# Patient Record
Sex: FEMALE | Race: WHITE | NOT HISPANIC OR LATINO | Employment: OTHER | URBAN - METROPOLITAN AREA
[De-identification: names, ages, dates, MRNs, and addresses within clinical notes are randomized per-mention and may not be internally consistent; named-entity substitution may affect disease eponyms.]

---

## 2018-06-25 ENCOUNTER — OFFICE VISIT (OUTPATIENT)
Dept: GASTROENTEROLOGY | Facility: CLINIC | Age: 58
End: 2018-06-25
Payer: COMMERCIAL

## 2018-06-25 VITALS
HEART RATE: 75 BPM | HEIGHT: 67 IN | BODY MASS INDEX: 18.89 KG/M2 | DIASTOLIC BLOOD PRESSURE: 86 MMHG | WEIGHT: 120.38 LBS | SYSTOLIC BLOOD PRESSURE: 135 MMHG

## 2018-06-25 DIAGNOSIS — R10.13 EPIGASTRIC ABDOMINAL PAIN: Primary | ICD-10-CM

## 2018-06-25 PROCEDURE — 99999 PR PBB SHADOW E&M-EST. PATIENT-LVL III: CPT | Mod: PBBFAC,,, | Performed by: INTERNAL MEDICINE

## 2018-06-25 PROCEDURE — 3008F BODY MASS INDEX DOCD: CPT | Mod: CPTII,S$GLB,, | Performed by: INTERNAL MEDICINE

## 2018-06-25 PROCEDURE — 99203 OFFICE O/P NEW LOW 30 MIN: CPT | Mod: S$GLB,,, | Performed by: INTERNAL MEDICINE

## 2018-06-25 RX ORDER — PRAVASTATIN SODIUM 80 MG/1
80 TABLET ORAL DAILY
COMMUNITY

## 2018-06-25 RX ORDER — TRAZODONE HYDROCHLORIDE 100 MG/1
100 TABLET ORAL NIGHTLY PRN
COMMUNITY

## 2018-06-25 RX ORDER — VITAMIN B COMPLEX
1 CAPSULE ORAL DAILY
COMMUNITY

## 2018-06-25 NOTE — PROGRESS NOTES
[facility] Consultation Note    Reason for Consult:    Chief Complaint   Patient presents with    Abdominal Pain     burning sensation for 36 hrs.       PCP:   Chino Figueroa    Referring MD:  Meme Self  No address on file    HPI:  Eden Kuhn is a 57 y.o. female here for evaluation of upper abdominal and epigastric abdominal pain.  It is a burning pain associated with sweats.  It was sudden in onset Thursday night of last week and was severe.  It lasted for about 36 hours.  She denies nausea or vomiting.  Prevacid seemed to help.  She also tried Pepto Bismol and Zantac, so she is unsure which treatment really helped the most.  Yesterday was the last episode of burning.  She has never experienced this before.  Eight to ten years ago, she had a general uncomfortable feeling in her stomach which she feels was due to anxiety, but it was nothing like this.  She has been under a lot of stress in the last 6 weeks.  Her diet is not extensive.  She had a hamburger before this happened - this is not something she normally eats.  She drinks a lot of beer, sometimes up to 30 in a week.  She denies other alcohol intake.    Her bowels have not changed.  She denies any medication changes.  She takes NSAIDs about 3 times per week.  She denies heartburn.  She has been able to eat without worsening pain or nausea.  She has never had a colonoscopy.            ROS:  Constitutional: No fevers, chills, No weight loss  ENT: No congestion, rhinorrhea, or chronic sinus problems  CV: No chest pain or palpitations  Pulm: No cough, No shortness of breath  Ophtho: No vision changes or pain  GI: see HPI  Derm: she had an itchy rash on her back a few months ago  Heme: No lymphadenopathy, No bruising  MSK: she has right knee pain and pains in both hands, no joint swelling  : No dysuria, No frequent urination  Endo: No hot or cold intolerance        Medical History:  has a past medical history of Thyroid  "disease.    Surgical History:  has a past surgical history that includes bladder repair; Hysterectomy; Anal fistulotomy (01/21/2002); Cystocele repair (06/06/2011); and Eye surgery (02/23/2012).    Family History: family history includes Diabetes in her mother; Thyroid disease in her mother.    Social History:  reports that she has been smoking.  She does not have any smokeless tobacco history on file. She reports that she drinks alcohol. She reports that she does not use drugs.    Review of patient's allergies indicates:   Allergen Reactions    Ciprocinonide Anaphylaxis       Prior to Admission medications    Medication Sig Start Date End Date Taking? Authorizing Provider   b complex vitamins capsule Take 1 capsule by mouth once daily.   Yes Historical Provider, MD   carisoprodol (SOMA) 350 MG tablet  1/8/16  Yes Historical Provider, MD   diphenoxylate-atropine 2.5-0.025 mg (LOMOTIL) 2.5-0.025 mg per tablet Take 1 tablet by mouth every 6 (six) hours as needed. 1/8/16  Yes Historical Provider, MD   hydrocodone-acetaminophen 10-325mg (NORCO)  mg Tab Take 1 tablet by mouth every 6 (six) hours as needed. 2/12/16  Yes Historical Provider, MD   levothyroxine (SYNTHROID) 137 MCG Tab tablet 125 mcg.  1/12/16  Yes Historical Provider, MD   pravastatin (PRAVACHOL) 40 MG tablet Take 40 mg by mouth once daily.   Yes Historical Provider, MD   traZODone (DESYREL) 50 MG tablet Take 50 mg by mouth as needed for Insomnia.   Yes Historical Provider, MD   levothyroxine (SYNTHROID, LEVOTHROID) 175 MCG tablet Take 175 mcg by mouth once daily.      Historical Provider, MD   valacyclovir (VALTREX) 500 MG tablet  2/18/16 6/25/18  Historical Provider, MD   zolpidem (AMBIEN) 10 mg Tab Take 10 mg by mouth nightly. 1/28/16 6/25/18  Historical Provider, MD       Objective Findings:  Vital Signs:  /86   Pulse 75   Ht 5' 7" (1.702 m)   Wt 54.6 kg (120 lb 5.9 oz)   BMI 18.85 kg/m²   Body mass index is 18.85 kg/m².    Physical " Exam:  General Appearance:  Well appearing in no acute distress, appears stated age  Head:  Normocephalic, atraumatic  Eyes:  No scleral icterus or pallor, EOMI  ENT:  Neck supple, moist mucosa; OP clear  Lungs:  CTA bilaterally in anterior and posterior fields, no wheezes, no crackles; no dullness  Heart:  Regular rate and rhythm, S1, S2 normal, no murmurs heard  Abdomen:  Soft, mild epigastric tenderness without rebound, non distended with positive bowel sounds in all four quadrants. No hepatosplenomegaly, ascites, or mass  Extremities:  No clubbing, cyanosis, or edema        Labs:  Lab Results   Component Value Date    WBC 10.11 11/19/2013    HGB 12.0 11/19/2013    HCT 35.1 (L) 11/19/2013    MCV 97 11/19/2013    RDW 13.0 11/19/2013     (H) 11/19/2013    GRAN 5.9 11/19/2013    GRAN 58.6 11/19/2013    LYMPH 3.2 11/19/2013    LYMPH 31.9 11/19/2013    MONO 0.8 11/19/2013    MONO 7.7 11/19/2013    EOS 0.1 11/19/2013    BASO 0.02 11/19/2013     Lab Results   Component Value Date     (L) 11/19/2013    K 3.7 11/19/2013    CL 97 11/19/2013    CO2 22 (L) 11/19/2013    GLU 72 11/19/2013    BUN 10 11/19/2013    CREATININE 0.8 11/19/2013    CALCIUM 9.8 11/19/2013    PROT 7.5 11/19/2013    ALBUMIN 4.4 11/19/2013    BILITOT 0.5 11/19/2013    ALKPHOS 38 (L) 11/19/2013    AST 19 11/19/2013    ALT 18 11/19/2013                   Assessment:  Eden Kuhn is a 57 y.o. female with:  1. Epigastric abdominal pain      Unclear etiology.  She has significant alcohol intake with beer and also NSAID use.  Consider pancreatitis, peptic ulcer, H pylori or gallstones.        Recommendations/Plan:  1. Will get labs today as noted below  2. Schedule for abdominal ultrasound  3. I will check for H pylori  4. Avoid NSAIDs  5. Take Prevacid daily (OTC)    Follow-up pending the above      Order summary:  Orders Placed This Encounter    US Abdomen Complete    CBC auto differential    Comprehensive metabolic panel    Lipase     H. pylori antigen, stool         Thank you so much for allowing me to participate in the care of Eden Olivera MD

## 2018-06-28 ENCOUNTER — HOSPITAL ENCOUNTER (OUTPATIENT)
Dept: RADIOLOGY | Facility: HOSPITAL | Age: 58
Discharge: HOME | End: 2018-06-28
Attending: INTERNAL MEDICINE
Payer: COMMERCIAL

## 2018-06-28 DIAGNOSIS — R10.13 EPIGASTRIC ABDOMINAL PAIN: ICD-10-CM

## 2018-06-28 PROCEDURE — 76700 US EXAM ABDOM COMPLETE: CPT | Mod: TC

## 2018-06-28 PROCEDURE — 76700 US EXAM ABDOM COMPLETE: CPT | Mod: 26,,, | Performed by: RADIOLOGY

## 2018-07-02 ENCOUNTER — TELEPHONE (OUTPATIENT)
Dept: GASTROENTEROLOGY | Facility: CLINIC | Age: 58
End: 2018-07-02

## 2018-07-02 NOTE — TELEPHONE ENCOUNTER
----- Message from Nancy Garner sent at 7/2/2018  3:37 PM CDT -----  Contact: pt can be reached at   Pt is calling because she was not able to complete the last test. Pt will complete soon as possible and she would like to know about there test results.      Thank you!

## 2018-07-03 ENCOUNTER — TELEPHONE (OUTPATIENT)
Dept: GASTROENTEROLOGY | Facility: CLINIC | Age: 58
End: 2018-07-03

## 2018-07-03 DIAGNOSIS — R10.10 UPPER ABDOMINAL PAIN: Primary | ICD-10-CM

## 2018-07-03 NOTE — TELEPHONE ENCOUNTER
Returned patient's call. Patient is requesting the results of her US and labs. Mrs. JOVON Carft is not able to turn in her stool specimen at this time.    Message routed to Dr. Olivera.

## 2018-07-03 NOTE — TELEPHONE ENCOUNTER
----- Message from Bere Beck sent at 7/3/2018 11:25 AM CDT -----  Contact: Self [phone]  Patient is requesting a call back from the nurse to get her results.  Patient states she tried yesterday to get a call back but has not heard anything yet.    Patient may be reached at [phone].    Thank you.  SHARRI

## 2018-07-03 NOTE — TELEPHONE ENCOUNTER
I called and discussed results with the patient.  No findings yet to explain symptoms.  Pancreas and liver are normal.  Sludge it the gallbladder, but no signs of inflammation or biliary obstruction.  She has not been able to submit the stool sample for H pylori yet.    She has been taking Prevacid daily.  She still is not feeling better, but also not worse.    I recommend we get an abdominal CT scan and also arrange for EGD.  Will get the CT scan first.  Will plan on EGD for th week of July 16-20 and can cancel if not needed by then.  She will try and submit the stool sample.

## 2018-07-03 NOTE — TELEPHONE ENCOUNTER
CT scan scheduled and instructions reviewed with patient. All questions answered to patient's satisfaction.

## 2018-07-05 ENCOUNTER — LAB VISIT (OUTPATIENT)
Dept: LAB | Facility: HOSPITAL | Age: 58
End: 2018-07-05
Attending: INTERNAL MEDICINE
Payer: COMMERCIAL

## 2018-07-05 DIAGNOSIS — R10.13 EPIGASTRIC ABDOMINAL PAIN: ICD-10-CM

## 2018-07-05 PROCEDURE — 87338 HPYLORI STOOL AG IA: CPT

## 2018-07-06 ENCOUNTER — TELEPHONE (OUTPATIENT)
Dept: GASTROENTEROLOGY | Facility: CLINIC | Age: 58
End: 2018-07-06

## 2018-07-06 NOTE — TELEPHONE ENCOUNTER
----- Message from Yuliya Allen MA sent at 7/6/2018  3:58 PM CDT -----  Contact: self  [phone]  Needs Advice    Reason for call:    I have a ctscan today, Friday, 7-6-18 at 6:15pm and it is still pending with her insurance company  Communication Preference:  Phone# above  Additional Information:  Please call me since I want to do this test and the insurance company is telling me it is still pending approval.

## 2018-07-09 ENCOUNTER — TELEPHONE (OUTPATIENT)
Dept: GASTROENTEROLOGY | Facility: CLINIC | Age: 58
End: 2018-07-09

## 2018-07-09 NOTE — TELEPHONE ENCOUNTER
----- Message from Leyla Howard sent at 7/9/2018  1:24 PM CDT -----  Contact: Brandi, friend, [phone]  Patient Returning Call from [facility]    Who Left Message for Patient: Pt states she got a missed called from [facility], but chart shows no record of calling.   Communication Preference: hank Paz, [phone]   Additional Information:

## 2018-07-09 NOTE — TELEPHONE ENCOUNTER
Returned patient's call. Patient is aware approval is still pending for her CT scan. She verbalized understanding and will contact our Pre-Service department for an update.

## 2018-07-11 LAB — H PYLORI AG STL QL: NOT DETECTED

## 2018-07-12 ENCOUNTER — HOSPITAL ENCOUNTER (OUTPATIENT)
Dept: RADIOLOGY | Facility: HOSPITAL | Age: 58
Discharge: HOME | End: 2018-07-12
Attending: INTERNAL MEDICINE
Payer: COMMERCIAL

## 2018-07-12 DIAGNOSIS — R10.10 UPPER ABDOMINAL PAIN: ICD-10-CM

## 2018-07-12 PROCEDURE — 74177 CT ABD & PELVIS W/CONTRAST: CPT | Mod: TC

## 2018-07-12 PROCEDURE — 74177 CT ABD & PELVIS W/CONTRAST: CPT | Mod: 26,,, | Performed by: RADIOLOGY

## 2018-07-12 PROCEDURE — 25500020 PHARM REV CODE 255: Performed by: INTERNAL MEDICINE

## 2018-07-12 RX ADMIN — IOHEXOL 75 ML: 350 INJECTION, SOLUTION INTRAVENOUS at 03:07

## 2018-07-13 ENCOUNTER — TELEPHONE (OUTPATIENT)
Dept: GASTROENTEROLOGY | Facility: CLINIC | Age: 58
End: 2018-07-13

## 2018-07-13 NOTE — TELEPHONE ENCOUNTER
Returned patient's call, but she did not answer. Left voicemail for patient to return call.    Message routed to Dr. Olivera.

## 2018-07-13 NOTE — TELEPHONE ENCOUNTER
----- Message from Nuria Dk sent at 7/13/2018  9:36 AM CDT -----  Contact: self [phone]  Jarod - calling re her procedures and test results - please call patient at [phone]

## 2018-07-16 ENCOUNTER — PATIENT MESSAGE (OUTPATIENT)
Dept: GASTROENTEROLOGY | Facility: CLINIC | Age: 58
End: 2018-07-16

## 2018-07-16 NOTE — TELEPHONE ENCOUNTER
Spoke with the patient regarding results.  CT scan unremarkable for cause of pain.  H pylori stool test is negative.  Will proceed with EGD.  Message sent to endoscopy schedulers to contact her.

## 2018-07-17 ENCOUNTER — TELEPHONE (OUTPATIENT)
Dept: ENDOSCOPY | Facility: HOSPITAL | Age: 58
End: 2018-07-17

## 2018-08-03 ENCOUNTER — ANESTHESIA EVENT (OUTPATIENT)
Dept: ENDOSCOPY | Facility: HOSPITAL | Age: 58
End: 2018-08-03
Payer: COMMERCIAL

## 2018-08-03 ENCOUNTER — HOSPITAL ENCOUNTER (OUTPATIENT)
Facility: HOSPITAL | Age: 58
Discharge: HOME | End: 2018-08-03
Attending: INTERNAL MEDICINE | Admitting: INTERNAL MEDICINE
Payer: COMMERCIAL

## 2018-08-03 ENCOUNTER — ANESTHESIA (OUTPATIENT)
Dept: ENDOSCOPY | Facility: HOSPITAL | Age: 58
End: 2018-08-03
Payer: COMMERCIAL

## 2018-08-03 ENCOUNTER — SURGERY (OUTPATIENT)
Age: 58
End: 2018-08-03

## 2018-08-03 VITALS
HEART RATE: 74 BPM | RESPIRATION RATE: 16 BRPM | BODY MASS INDEX: 19.99 KG/M2 | HEIGHT: 65 IN | DIASTOLIC BLOOD PRESSURE: 82 MMHG | SYSTOLIC BLOOD PRESSURE: 143 MMHG | OXYGEN SATURATION: 99 % | TEMPERATURE: 98 F | WEIGHT: 120 LBS

## 2018-08-03 DIAGNOSIS — R10.10 UPPER ABDOMINAL PAIN: Primary | ICD-10-CM

## 2018-08-03 PROCEDURE — 88305 TISSUE EXAM BY PATHOLOGIST: CPT | Mod: 26,,, | Performed by: PATHOLOGY

## 2018-08-03 PROCEDURE — 43239 EGD BIOPSY SINGLE/MULTIPLE: CPT | Mod: ,,, | Performed by: INTERNAL MEDICINE

## 2018-08-03 PROCEDURE — 37000008 HC ANESTHESIA 1ST 15 MINUTES: Performed by: INTERNAL MEDICINE

## 2018-08-03 PROCEDURE — 88305 TISSUE EXAM BY PATHOLOGIST: CPT | Performed by: PATHOLOGY

## 2018-08-03 PROCEDURE — E9220 PRA ENDO ANESTHESIA: HCPCS | Mod: ,,, | Performed by: NURSE ANESTHETIST, CERTIFIED REGISTERED

## 2018-08-03 PROCEDURE — 27201012 HC FORCEPS, HOT/COLD, DISP: Performed by: INTERNAL MEDICINE

## 2018-08-03 PROCEDURE — 25000003 PHARM REV CODE 250: Performed by: INTERNAL MEDICINE

## 2018-08-03 PROCEDURE — 43239 EGD BIOPSY SINGLE/MULTIPLE: CPT | Performed by: INTERNAL MEDICINE

## 2018-08-03 PROCEDURE — 37000009 HC ANESTHESIA EA ADD 15 MINS: Performed by: INTERNAL MEDICINE

## 2018-08-03 PROCEDURE — 63600175 PHARM REV CODE 636 W HCPCS: Performed by: NURSE ANESTHETIST, CERTIFIED REGISTERED

## 2018-08-03 RX ORDER — MIDAZOLAM HYDROCHLORIDE 1 MG/ML
INJECTION, SOLUTION INTRAMUSCULAR; INTRAVENOUS
Status: DISCONTINUED | OUTPATIENT
Start: 2018-08-03 | End: 2018-08-03

## 2018-08-03 RX ORDER — SODIUM CHLORIDE 0.9 % (FLUSH) 0.9 %
3 SYRINGE (ML) INJECTION
Status: DISCONTINUED | OUTPATIENT
Start: 2018-08-03 | End: 2018-08-03 | Stop reason: HOSPADM

## 2018-08-03 RX ORDER — SODIUM CHLORIDE 9 MG/ML
INJECTION, SOLUTION INTRAVENOUS CONTINUOUS
Status: DISCONTINUED | OUTPATIENT
Start: 2018-08-03 | End: 2018-08-03 | Stop reason: HOSPADM

## 2018-08-03 RX ORDER — PROPOFOL 10 MG/ML
VIAL (ML) INTRAVENOUS
Status: DISCONTINUED | OUTPATIENT
Start: 2018-08-03 | End: 2018-08-03

## 2018-08-03 RX ADMIN — SODIUM CHLORIDE: 0.9 INJECTION, SOLUTION INTRAVENOUS at 10:08

## 2018-08-03 RX ADMIN — PROPOFOL 50 MG: 10 INJECTION, EMULSION INTRAVENOUS at 11:08

## 2018-08-03 RX ADMIN — MIDAZOLAM HYDROCHLORIDE 2 MG: 1 INJECTION, SOLUTION INTRAMUSCULAR; INTRAVENOUS at 11:08

## 2018-08-03 RX ADMIN — PROPOFOL 40 MG: 10 INJECTION, EMULSION INTRAVENOUS at 11:08

## 2018-08-03 RX ADMIN — PROPOFOL 30 MG: 10 INJECTION, EMULSION INTRAVENOUS at 11:08

## 2018-08-03 NOTE — ANESTHESIA PREPROCEDURE EVALUATION
08/03/2018  Eden Kuhn is a 57 y.o., female.    Patient Active Problem List   Diagnosis    Upper abdominal pain         Anesthesia Evaluation    I have reviewed the Patient Summary Reports.    I have reviewed the Nursing Notes.   I have reviewed the Medications.     Review of Systems  Anesthesia Hx:  Denies Family Hx of Anesthesia complications.   Denies Personal Hx of Anesthesia complications.       Physical Exam  General:  Well nourished    Airway/Jaw/Neck:  Airway Findings: Mouth Opening: Normal Tongue: Normal  General Airway Assessment: Adult  Mallampati: II  TM Distance: Normal, at least 6 cm       Chest/Lungs:  Chest/Lungs Findings: Clear to auscultation, Normal Respiratory Rate     Heart/Vascular:  Heart Findings: Rate: Normal  Rhythm: Regular Rhythm  Sounds: Normal     Abdomen:  Abdomen Findings:  Normal     Musculoskeletal:  Musculoskeletal Findings:    Skin:  Skin Findings:     Mental Status:  Mental Status Findings:  Cooperative, Alert and Oriented         Anesthesia Plan  Type of Anesthesia, risks & benefits discussed:  Anesthesia Type:  general  Patient's Preference: general  Intra-op Monitoring Plan:   Intra-op Monitoring Plan Comments:   Post Op Pain Control Plan:   Post Op Pain Control Plan Comments:   Induction:   IV  Beta Blocker:  Patient is not currently on a Beta-Blocker (No further documentation required).       Informed Consent: Patient understands risks and agrees with Anesthesia plan.  Questions answered. Anesthesia consent signed with patient.  ASA Score: 2     Day of Surgery Review of History & Physical: I have interviewed and examined the patient. I have reviewed the patient's H&P dated:  There are no significant changes.          Ready For Surgery From Anesthesia Perspective.

## 2018-08-03 NOTE — TRANSFER OF CARE
"Anesthesia Transfer of Care Note    Patient: Eden Kuhn    Procedure(s) Performed: Procedure(s) (LRB):  EGD (ESOPHAGOGASTRODUODENOSCOPY) (N/A)    Patient location: PACU    Anesthesia Type: general    Transport from OR: Transported from OR on room air with adequate spontaneous ventilation    Post assessment: tolerated procedure well and no apparent anesthetic complications    Post vital signs: stable    Level of consciousness: awake and alert    Nausea/Vomiting: no nausea/vomiting    Complications: none    Transfer of care protocol was followed      Last vitals:   Visit Vitals  BP (!) 137/93 (BP Location: Left arm, Patient Position: Sitting)   Pulse 80   Temp 36.2 °C (97.2 °F) (Temporal)   Resp 16   Ht 5' 5" (1.651 m)   Wt 54.4 kg (120 lb)   SpO2 99%   Breastfeeding? No   BMI 19.97 kg/m²     "

## 2018-08-03 NOTE — DISCHARGE INSTRUCTIONS

## 2018-08-03 NOTE — H&P
Short Stay Endoscopy History and Physical    PCP - Chino Figueroa MD     Procedure - EGD  ASA - per anesthesia  Mallampati - per anesthesia  History of Anesthesia problems - no  Family history Anesthesia problems -  no   Plan of anesthesia - General    HPI:  This is a 57 y.o. female here for evaluation of RUQ abdominal pain.      Reflux - no  Dysphagia - no  Abdominal pain - yes  Diarrhea - no    ROS:  Constitutional: No fevers, chills, No weight loss  CV: No chest pain  Pulm: No cough, No shortness of breath  Ophtho: No vision changes  GI: see HPI  Derm: No rash    Medical History:  has a past medical history of Anxiety; Arthritis; Insomnia; and Thyroid disease.    Surgical History:  has a past surgical history that includes bladder repair; Hysterectomy; Anal fistulotomy (01/21/2002); Cystocele repair (06/06/2011); and Eye surgery (02/23/2012).    Family History: family history includes Diabetes in her mother; Thyroid disease in her mother.. Otherwise no colon cancer, inflammatory bowel disease, or GI malignancies.    Social History:  reports that she has been smoking.  She has a 6.50 pack-year smoking history. She does not have any smokeless tobacco history on file. She reports that she drinks about 1.2 - 1.8 oz of alcohol per week . She reports that she does not use drugs.    Review of patient's allergies indicates:   Allergen Reactions    Ciprocinonide Anaphylaxis       Medications:   Prescriptions Prior to Admission   Medication Sig Dispense Refill Last Dose    b complex vitamins capsule Take 1 capsule by mouth once daily.   8/2/2018 at Unknown time    carisoprodol (SOMA) 350 MG tablet   0 8/3/2018 at Unknown time    hydrocodone-acetaminophen 10-325mg (NORCO)  mg Tab Take 1 tablet by mouth every 6 (six) hours as needed.  0 8/2/2018 at Unknown time    levothyroxine (SYNTHROID) 137 MCG Tab tablet 125 mcg.   0 8/3/2018 at Unknown time    levothyroxine (SYNTHROID, LEVOTHROID) 175 MCG tablet Take 175  mcg by mouth once daily.     8/3/2018 at Unknown time    pravastatin (PRAVACHOL) 40 MG tablet Take 40 mg by mouth once daily.   8/3/2018 at Unknown time    diphenoxylate-atropine 2.5-0.025 mg (LOMOTIL) 2.5-0.025 mg per tablet Take 1 tablet by mouth every 6 (six) hours as needed.  0 Unknown at Unknown time    traZODone (DESYREL) 50 MG tablet Take 50 mg by mouth as needed for Insomnia.   Unknown at Unknown time       Physical Exam:    Vital Signs:   Vitals:    08/03/18 1104   BP: (!) 137/93   Pulse: 80   Resp: 16   Temp: 97.2 °F (36.2 °C)       General Appearance: Well appearing in no acute distress  Eyes:    No scleral icterus  ENT: Neck supple, Lips, mucosa, and tongue normal; teeth and gums normal  Lungs: CTA anteriorly  Heart:  Regular rate, S1, S2 normal, no murmurs heard.  Abdomen: Soft, non tender, non distended with normal bowel sounds. No hepatosplenomegaly, ascites, or mass.  Extremities: No edema  Skin: No rash    Labs:  Lab Results   Component Value Date    WBC 8.17 06/28/2018    HGB 12.1 06/28/2018    HCT 34.7 (L) 06/28/2018     (H) 06/28/2018    CHOL 208 (H) 11/19/2013    TRIG 75 11/19/2013    HDL 80 (H) 11/19/2013    ALT 12 06/28/2018    AST 16 06/28/2018     (L) 06/28/2018    K 4.1 06/28/2018    CL 99 06/28/2018    CREATININE 0.7 06/28/2018    BUN 8 06/28/2018    CO2 27 06/28/2018    TSH 0.319 (L) 11/19/2013    INR 1.0 08/13/2009    HGBA1C 4.8 12/02/2004       I have explained the risks and benefits of endoscopy procedures to the patient including but not limited to bleeding, perforation, infection, and death.    Jovany Balderrama MD PGY-V  Gastroenterology Fellow  [facility]  [phone]

## 2018-08-03 NOTE — ANESTHESIA POSTPROCEDURE EVALUATION
"Anesthesia Post Evaluation    Patient: Eden Kuhn    Procedure(s) Performed: Procedure(s) (LRB):  EGD (ESOPHAGOGASTRODUODENOSCOPY) (N/A)    Final Anesthesia Type: general  Patient location during evaluation: GI PACU  Patient participation: Yes- Able to Participate  Level of consciousness: awake and alert and oriented  Post-procedure vital signs: reviewed and stable  Pain management: adequate  Airway patency: patent  PONV status at discharge: No PONV  Anesthetic complications: no      Cardiovascular status: hemodynamically stable  Respiratory status: unassisted  Hydration status: euvolemic  Follow-up not needed.        Visit Vitals  BP (!) 143/82   Pulse 74   Temp 36.7 °C (98.1 °F) (Temporal)   Resp 16   Ht 5' 5" (1.651 m)   Wt 54.4 kg (120 lb)   SpO2 99%   Breastfeeding? No   BMI 19.97 kg/m²       Pain/Mary Score: Pain Assessment Performed: Yes (8/3/2018 12:25 PM)  Presence of Pain: denies (8/3/2018 12:25 PM)  Mary Score: 10 (8/3/2018 12:00 PM)      "

## 2018-08-03 NOTE — [employer] PATIENT INSTRUCTIONS
Discharge Summary/Instructions after an Endoscopic Procedure  Patient Name: Eden Grimaldo  Patient MRN: [id number]  Patient YOB: 1960 Friday, August 03, 2018  Adam Olivera MD  RESTRICTIONS:  During your procedure today, you received medications for sedation.  These   medications may affect your judgment, balance and coordination.  Therefore,   for 24 hours, you have the following restrictions:   - DO NOT drive a car, operate machinery, make legal/financial decisions,   sign important papers or drink alcohol.    ACTIVITY:  Today: no heavy lifting, straining or running due to procedural   sedation/anesthesia.  The following day: return to full activity including work.  DIET:  Eat and drink normally unless instructed otherwise.     TREATMENT FOR COMMON SIDE EFFECTS:  - Mild abdominal pain, nausea, belching, bloating or excessive gas:  rest,   eat lightly and use a heating pad.  - Sore Throat: treat with throat lozenges and/or gargle with warm salt   water.  - Because air was used during the procedure, expelling large amounts of air   from your rectum or belching is normal.  - If a bowel prep was taken, you may not have a bowel movement for 1-3 days.    This is normal.  SYMPTOMS TO WATCH FOR AND REPORT TO YOUR PHYSICIAN:  1. Abdominal pain or bloating, other than gas cramps.  2. Chest pain.  3. Back pain.  4. Signs of infection such as: chills or fever occurring within 24 hours   after the procedure.  5. Rectal bleeding, which would show as bright red, maroon, or black stools.   (A tablespoon of blood from the rectum is not serious, especially if   hemorrhoids are present.)  6. Vomiting.  7. Weakness or dizziness.  GO DIRECTLY TO THE NEAREST EMERGENCY ROOM IF YOU HAVE ANY OF THE FOLLOWING:      Difficulty breathing              Chills and/or fever over 101 F   Persistent vomiting and/or vomiting blood   Severe abdominal pain   Severe chest pain   Black, tarry stools   Bleeding- more than one  tablespoon   Any other symptom or condition that you feel may need urgent attention  Your doctor recommends these additional instructions:  If any biopsies were taken, your doctors clinic will contact you in 1 to 2   weeks with any results.  - Discharge patient to home.   - Patient has a contact number available for emergencies.  The signs and   symptoms of potential delayed complications were discussed with the   patient.  Return to normal activities tomorrow.  Written discharge   instructions were provided to the patient.   - Resume previous diet.   - Continue present medications.   - Await pathology results.   - Telephone GI clinic for pathology results in 1 week.  For questions, problems or results please call your physician - Adam Olivera MD at Work:  [phone].  [facility], EMERGENCY ROOM PHONE NUMBER: [phone]  IF A COMPLICATION OR EMERGENCY SITUATION ARISES AND YOU ARE UNABLE TO REACH   YOUR PHYSICIAN - GO DIRECTLY TO THE EMERGENCY ROOM.  Adam Olivera MD  8/3/2018 11:46:11 AM  This report has been verified and signed electronically.  [employer]

## 2018-08-10 ENCOUNTER — TELEPHONE (OUTPATIENT)
Dept: ENDOSCOPY | Facility: HOSPITAL | Age: 58
End: 2018-08-10

## 2018-08-14 ENCOUNTER — TELEPHONE (OUTPATIENT)
Dept: GASTROENTEROLOGY | Facility: CLINIC | Age: 58
End: 2018-08-14

## 2018-08-14 NOTE — TELEPHONE ENCOUNTER
----- Message from Adam Olivera MD sent at 8/14/2018  8:58 AM CDT -----  The stomach biopsies came back completely normal.    MA to call patient with results.

## 2019-11-14 ENCOUNTER — OFFICE VISIT (OUTPATIENT)
Dept: OPTOMETRY | Facility: CLINIC | Age: 59
End: 2019-11-14
Payer: COMMERCIAL

## 2019-11-14 DIAGNOSIS — H02.88A MEIBOMIAN GLAND DYSFUNCTION (MGD) OF UPPER AND LOWER LIDS OF BOTH EYES: Primary | ICD-10-CM

## 2019-11-14 DIAGNOSIS — H52.03 HYPEROPIA OF BOTH EYES: ICD-10-CM

## 2019-11-14 DIAGNOSIS — H52.4 PRESBYOPIA: ICD-10-CM

## 2019-11-14 DIAGNOSIS — H02.88B MEIBOMIAN GLAND DYSFUNCTION (MGD) OF UPPER AND LOWER LIDS OF BOTH EYES: Primary | ICD-10-CM

## 2019-11-14 PROCEDURE — 92004 PR EYE EXAM, NEW PATIENT,COMPREHESV: ICD-10-PCS | Mod: S$GLB,,, | Performed by: OPTOMETRIST

## 2019-11-14 PROCEDURE — 92015 PR REFRACTION: ICD-10-PCS | Mod: S$GLB,,, | Performed by: OPTOMETRIST

## 2019-11-14 PROCEDURE — 92015 DETERMINE REFRACTIVE STATE: CPT | Mod: S$GLB,,, | Performed by: OPTOMETRIST

## 2019-11-14 PROCEDURE — 92004 COMPRE OPH EXAM NEW PT 1/>: CPT | Mod: S$GLB,,, | Performed by: OPTOMETRIST

## 2019-11-14 PROCEDURE — 99999 PR PBB SHADOW E&M-EST. PATIENT-LVL III: ICD-10-PCS | Mod: PBBFAC,,, | Performed by: OPTOMETRIST

## 2019-11-14 PROCEDURE — 99999 PR PBB SHADOW E&M-EST. PATIENT-LVL III: CPT | Mod: PBBFAC,,, | Performed by: OPTOMETRIST

## 2019-11-14 RX ORDER — LEVOTHYROXINE SODIUM 125 UG/1
125 TABLET ORAL DAILY
Refills: 1 | COMMUNITY
Start: 2019-10-22

## 2019-11-14 RX ORDER — ERGOCALCIFEROL 1.25 MG/1
50000 CAPSULE ORAL
Refills: 5 | COMMUNITY
Start: 2019-10-16

## 2019-11-14 RX ORDER — VALACYCLOVIR HYDROCHLORIDE 500 MG/1
500 TABLET, FILM COATED ORAL DAILY
Refills: 9 | COMMUNITY
Start: 2019-10-22

## 2019-11-14 RX ORDER — SULFAMETHOXAZOLE AND TRIMETHOPRIM 800; 160 MG/1; MG/1
TABLET ORAL
Refills: 1 | COMMUNITY
Start: 2019-08-26 | End: 2022-01-10

## 2019-11-14 RX ORDER — LIOTHYRONINE SODIUM 5 UG/1
TABLET ORAL
Refills: 1 | COMMUNITY
Start: 2019-10-22

## 2019-11-14 NOTE — PATIENT INSTRUCTIONS
Meibomian Gland Dysfunction    The meibomian glands are located in the eyelids, near the eyelashes. Secretions from these glands make up the lipid (oily) layer of the tear film. This oily layer sits on top of the liquid (aqueous) tears to prevent rapid evaporation of the tears. Failure of these glands to produce or secrete oil - due to chronic blockage, thickening of the oils, infection or inflammation, will affect the stability and quality of the tear film. This is known as meibomian gland dysfunction.    Meibomian Gland Dysfunction can be treated in several ways:   Warm Compresses: Heat applied to the eyelid margins (once to twice daily) liquefies the thickened oils, as well as helps to open the meibomian glands so that proper function can be restored.   Eyelid Cleanser: It is important to keep the eyelid margins free of excess bacteria and debris. A cleanser specifically formulated for the delicate eye area is an ideal way to accomplish this. Gently cleaning the eyelash/eyelid margins once to twice a day will also stimulate the meibomian glands to properly secrete their oils. Using baby shampoo to clean the eye area strips away the natural oils which protect the eye, often leading to more irritation and problems.    Topical antibiotic ointment   Oral Antibiotic    Omega 3 supplement daily      Hyperopia (Farsightedness)      Farsightedness, or hyperopia, as it is medically termed, is a vision condition in which distant objects are usually seen clearly, but close ones do not come into proper focus. Farsightedness occurs if your eyeball is too short or the cornea has too little curvature, so light entering your eye is not focused correctly.  Common signs of farsightedness include difficulty in concentrating and maintaining a clear focus on near objects, eye strain, fatigue and/or headaches after close work, aching or burning eyes, irritability or nervousness after sustained concentration.  Common vision  screenings, often done in schools, are generally ineffective in detecting farsightedness. A comprehensive optometric examination will include testing for farsightedness.  In mild cases of farsightedness, your eyes may be able to compensate without corrective lenses. In other cases, your optometrist can prescribe eyeglasses or contact lenses to optically correct farsightedness by altering the way the light enters your eyes      Courtesy of the American [employer] Association    Presbyopia      Presbyopia is a vision condition in which the crystalline lens of your eye loses its flexibility, which makes it difficult for you to focus on close objects.  Presbyopia may seem to occur suddenly, but the actual loss of flexibility takes place over a number of years. Presbyopia usually becomes noticeable in the early to mid-40s. Presbyopia is a natural part of the aging process of the eye. It is not a disease, and it cannot be prevented.  Some signs of presbyopia include the tendency to hold reading materials at arm's length, blurred vision at normal reading distance and eye fatigue along with headaches when doing close work. A comprehensive optometric examination will include testing for presbyopia.  To help you compensate for presbyopia, your optometrist can prescribe reading glasses, bifocals, trifocals or contact lenses. Because presbyopia can complicate other common vision conditions like nearsightedness, farsightedness and astigmatism, your optometrist will determine the specific lenses to allow you to see clearly and comfortably. You may only need to wear your glasses for close work like reading, but you may find that wearing them all the time is more convenient and beneficial for your vision needs.  Because the effects of presbyopia continue to change the ability of the crystalline lens to focus properly, periodic changes in your eyewear may be necessary to maintain clear and comfortable vision      Courtesy of the  American [employer] Association    Adult Vision:   Over 60 Years of Age    It's a fact of life that vision changes occur as you get older. But these changes don't have to compromise your lifestyle. Knowing what to expect and when to seek professional care are important steps to safeguarding your vision.  As you reach your 60s and beyond, you need to be attentive to warning signs of age-related eye health problems that could cause vision loss. Many eye diseases have no early symptoms. They may develop painlessly and you may not be aware of changes to your vision until the condition is quite advanced. But wise lifestyle choices and regular eye exams can significantly improve your chances of maintaining good eye health even as you age.      Safeguarding your vision as you age can have a tremendous impact on your quality of life.   You may not realize that health problems affecting other parts of your body can affect your vision as well. Individuals with diabetes or hypertension (high blood pressure), or taking medications that have eye-related side effects, are at greatest risk for developing vision problems.  Therefore, regular eye exams are even more important as you reach your senior years. The American Opt[employer] Association recommends annual eye examinations for everyone over age 60. See your doctor of optometry immediately if you notice any changes in your vision.    Age-related Eye and Vision Problems  In the years after you turn 60, a number of eye diseases may develop that can change your vision permanently. The earlier these problems are detected and treated, the more likely you can retain good vision.  The following are some vision disorders of which you should be aware:  Age-related macular degeneration (AMD) is an eye disease affecting the macula, the center of the light sensitive retina at the back of the eye, causing loss of central vision. Although small, the macula is the part of the retina that allows  us to see fine detail and colors. Activities like reading, driving, watching TV and recognizing faces all require good central vision provided by the macula. While macular degeneration causes changes in central vision, peripheral or side vision remains unaffected.      An anuual eye exam can help catch evastating eye diseases, like glaucoma and macular degeneration, early. Early detection increases the chances of maintaining healthy vision in senior years.   Diabetic retinopathy is a condition occurring in people with diabetes. It is the result of progressive damage to the tiny blood vessels that nourish the retina. They leak blood and other fluids that cause swelling of retinal tissue and clouding of vision. The condition usually affects both eyes. The longer a person has diabetes, the more likely they will develop diabetic retinopathy, which can cause blindness.  Retinal detachment is a tearing or separation of the retina from the underlying tissue. It can be caused by trauma to the eye or head, health problems like advanced diabetes, and inflammatory disorders of the eye. But it most often occurs spontaneously as a result of changes to the gel-like vitreous fluid that fills the back of the eye. If not treated promptly, it can cause permanent vision loss.  Cataracts are cloudy or opaque areas in the normally clear lens of the eye. Depending upon their size and location, they can interfere with normal vision. Usually cataracts develop in both eyes, but one may be worse than the other. Cataracts can cause a decrease in contrast sensitivity, a dulling of colors and increased sensitivity to glare.  Glaucoma is a group of eye diseases characterized by damage to the optic nerve resulting in vision loss. People with a family history of glaucoma,  Americans and older adults are at higher risk for developing the disease.   Dry eye is a condition in which there is an insufficient amount of tears or a poor quality of  tears to lubricate and nourish the eye. Tears are necessary for maintaining the health of the front surface of the eye and for providing clear vision. Dry eyes are a common and often chronic problem, particularly in older adults.       Driving Safely After 60  If you are 60 or older, driving a car may be increasingly difficult. Age-related vision changes and eye diseases can compromise driving ability, even before you are aware of symptoms. You may be noticing difficulty judging distances and speed. Bright sunglight or the headlights of oncoming traffic at night may impair your vision.      Night driving with cortical cataract.  .   Some age-related vision changes that commonly affect seniors' driving are:  Not being able to see road signs as clearly   Having difficulty seeing objects up close like the car instrument panel or road maps   Changes in color perception   Problems seeing in low light or nighttime conditions   Difficulty adapting to glare from headlights   Experiencing a loss of side vision   These tips can help you stay safe when driving, especially at night:  Use extra caution at intersections.  Many collisions involving older drivers occur at intersections due to a failure to yield, especially when taking a left turn. Look carefully in both directions before proceeding into an intersection and turn your head frequently when driving to compensate for any decreased peripheral vision.   Reduce your speed and limit yourself to daytime driving.  If you are having trouble seeing at night or your eyes have difficulty recovering from the glare of oncoming headlights, slow down and avoid driving at night or on unfamilair roads, whenever possible.   Avoid wearing eyeglasses and sunglasses with wide frames or temples.  Glasses with wide temples (side arms) may restrict your side vision.   Take a driving course for seniors.  Participate in a program for older drivers in your community, such as those offered by the  [employer] Retired [employer]ons (AARP). This can help you learn more about physical changes that may affect your driving ability and how to compensate for them.   Have an annual vision examination.  Yearly eye exams can ensure your eyeglass or contact lenses prescription is up to date and provide for early detection of any developing eye health problem.      Dealing with Vision Loss      Low vision rehabilitative services can provide people with the help and resources needed to regain their independence.   Unfortunately, some people over 60 experience loss of sight beyond the normal, age-related vision changes. Macular degeneration, glaucoma, and diabetic retinopathy are among the eye health conditions that can lead to permanent vision loss. This loss of vision can take many forms and it may exist in varying degrees.  It is important to understand that visual acuity alone is not a good predictor of the degree of visual difficulty that a person may have. Someone with relatively good acuity (e.g., 20/40) can have difficulty functioning, while someone with worse acuity (e.g., 20/100) might not be experiencing any significant functional problems. Other visual factors such as poor depth perception, limited side vision, extreme sensitivity to lights and glare, and reduced color perception can also limit a person's ability to do everyday tasks.  However, low vision rehabilitative services can provide people with the help and resources needed to regain their independence. Individuals with low vision can be taught a variety of techniques to allow performance of daily activities with the remaining vision.  Your doctor of optometry can help plan a rehabilitation program so that you may resume an independent life within your condition's limitations. A wide variety of rehabilitation options are available to help people with low vision live and work more effectively, efficiently, and safely. Most people can be helped with  "one or more low vision treatment options. The more commonly prescribed devices are:  Spectacle-mounted magnifiers -- A magnifying lens is mounted in spectacles (this type of system is called a microscope) or on a special headband. This allows use of both hands to complete a close-up task, such as writing a letter.   Hand-held or spectacle-mounted telescopes -- These miniature telescopes are useful for seeing longer distances, such as across the room to watch television, and can also be modified for near (reading) tasks.   Hand-held and stand magnifiers -- These are convenient for short-term reading of things such as price tags, labels, and instrument dials. Both types can be equipped with lights.   Video magnification -- Table-top (closed-circuit television) or head-mounted systems enlarge reading material on a video display. Some systems can be used for distance views tasks. These are portable systems, and those that can be used with a computer or monitor. Image brightness, image size, contrast, and foreground/background color and illumination can be customized.   In addition, there are numerous other products to assist those with a vision impairment, such as large-type books, magazines, and newspapers, books-on-tape, talking wristwatches, self-threading needles, and more.      Courtesy of The American Optometric Association      Eye Benefits of Omega-3 Fatty Acids  By George Benjamin, OD  You may find it hard to believe that fat is essential to your health, but it's true. Without fat, our bodies can't function properly. And without the proper kinds of fats in our diet, our eye health also may suffer.  Fatty acids are the "building blocks" of fat. These important nutrients are critical for the normal production and functioning of cells, muscles, nerves and organs. Fatty acids also are required for the production of hormone-like compounds that help regulate blood pressure, heart rate and blood clotting.  Some fatty " acids -- called essential fatty acids (EFAs) -- are necessary to our diet, because our body can't produce them. To stay healthy, we must obtain these fatty acids from our food.  Two types of EFAs are omega-3 fatty acids and omega-6 fatty acids. Studies have found that omega-3 fatty acids, in particular, may benefit eye health.  Omega-3 fatty acids include docosahexaenoic acid (DHA), eicoapentaenoic acid (EPA) and alpha-linolenic acid (ALA).  Omega-3 Fatty Acids and Infant Vision Development  A number of clinical studies have shown omega-3 fatty acids are essential for normal infant vision development.    Grilled salmon is an excellent natural source of omega-3 fatty acids.   DHA and other omega-3 fatty acids are found in maternal breast milk and also are added to some supplemented infant formulas. Omega-3 supplemental formulas appear to stimulate vision development in infants.  According to an analysis of several studies conducted by researchers at [facility] and published in the journal Pediatrics, the authors found that healthy pre-term infants who were fed DHA-supplemented formula showed significantly better visual acuity at 2 and 4 months of age, compared with similar pre-term infants who were fed formula that did not contain the omega-3 supplement.  Adequate amounts of DHA and other omega-3 fatty acids in the diet of pregnant women also appear to be important in normal infant vision development.  More Info   Learn more about omega-3s and save $2 on TheraTears Nutrition gels   Moderate to severe dry eyes? Find out if Retaine MGD is right for you   Learn how Optometry Giving Sight helps 670 million people to see again  In a study published in the American Journal of Clinical Nutrition, [place] researchers found that infant girls whose mothers received DHA supplements from their fourth month of pregnancy until delivery were less likely to have below-average visual acuity at 2 months of age  "than infant girls whose mothers did not receive the omega-3 supplements.  Adult Eye Benefits of Omega-3 Fatty Acids  Several studies suggest omega-3 fatty acids may help protect adult eyes from macular degeneration and dry eye syndrome. Essential fatty acids also may help proper drainage of intraocular fluid from the eye, decreasing the risk of high eye pressure and glaucoma.  In a large  study published in 2008, participants who ate oily fish (an excellent source of DHA and EPA omega-3 fatty acids) at least once per week had half the risk of developing neovascular ("wet") macular degeneration, compared with those who ate fish less than once per week.    Eye Nutrition News   Omega-3 Supplements Relieve Dry Eye Symptoms Among Computer Users, Study Finds  February 2015 -- Taking daily omega-3 fatty acid supplements could help relieve your dry eyes associated with computer use, according to a study.    The study participants were 456 computer users in [facility] who complained of dry eyes and who used a computer for more than three hours a day for at least one year.  Subjects in one group (220) were given two capsules of omega-3 fatty acids, each containing 180mg EPA and 120mg DHA, to supplement their daily diet; subjects in the other group (236) were given two capsules of a placebo containing olive oil for daily use. Each group took the daily supplements for three months.  At the end of the three-month trial, a survey of the participants revealed dry eye symptoms diminished after dietary intervention with omega-3 fatty acids, and use of the omega-3 supplements also reduced abnormal tear evaporation. The omega-3 supplements also increased the density of conjunctival goblet cells on the surface of the eye. These cells secrete substances that lubricate the eye during blinks, stabilize the tear film and reduce dryness.  The study authors concluded that orally administered omega-3 fatty acid supplements can alleviate " dry eye symptoms, slow tear evaporation, and improve signs of a healthy eye surface in patients suffering from dry eyes related to computer vision syndrome.  A report of this study was published online this month by the journal Contact Lens & Anterior Eye. -- G.[facility].  Also, a 2009 National [facility] [place] (NEI) study that used data obtained from the Age-Related Eye Disease Study (AREDS) found participants who reported the highest level of omega-3 fatty acids in their diet were 30 percent less likely than their peers to develop macular degeneration during a 12-year period.  In May 2013, the NEI published results of a large follow-up to the original AREDS study called AREDS2. Among other things, AREDS2 investigated whether daily supplementation of omega-3 fatty acids, along with the original AREDS nutritional supplement or modifications of that formula -- which contained beta-carotene, vitamin C, vitamin E, zinc and copper -- would further reduce the risk of AMD progression among study participants with early signs of macular degeneration. (The original AREDS supplement reduced the risk of AMD progression by 25 percent among a similar population.)  A somewhat surprising result of AREDS2 was that participants who supplemented their diet with 1,000 mg of omega-3s daily (350 mg DHA and 650 mg EPA) did not show any reduction of their risk for progressive AMD over the 5-year duration of the study, compared with participants who did not receive omega-3 supplements.  Possible explanations for these different findings from AREDS and AREDS2 data may be that omega-3 fatty acids are more effective at reducing the risk of age-related eye diseases when obtained via food sources rather than from nutritional supplements, and that a healthy diet containing plenty of omega-3s along with other important nutrients consumed over a person's lifetime is more protective than taking nutritional supplements for a 5-year period.  Omega-3 fatty  "acids also have been found to reduce the risk of dry eyes. In a study of more than 32,000 women between the ages of 45 and 84, those with the highest ratio of (potentially harmful) omega-6 fatty acids to beneficial omega-3 fatty acids in their diet (15-to-1) had a significantly greater risk of dry eye syndrome, compared with the women with the lowest ratio (less than 4-to-1). The study also found that the women who ate at least two servings of tuna per week had significantly less risk of dry eye than women who ate one or fewer servings per week.  Omega-3 fatty acids also may help treat dry eyes. In a recent study of dry eyes induced in mice, topical application of the omega-3 fatty acid ALA led to a significant decrease in dry eye signs and inflammation associated with dry eye.  [place]-3 Foods  While both omega-3 and omega-6 fatty acids are important to health, the balance of these two types of EFAs in our diet is extremely important. Most experts believe the ratio of omega-6 to omega-3 fatty acids in a healthy diet should be 4-to-1 or lower.  Many eye doctors recommend a diet high in omega-3 fatty acids to reduce the risk of eye problems.   Unfortunately, the typical American diet, characterized by significant amounts of meat and processed foods, tends to contain 10 to 30 times more omega-6 than omega-3 fatty acids. This imbalance of omega-6 ("bad") fatty acids to omega-3 ("good") fatty acids appears to be a contributing cause of a number of serious health problems, including heart disease, cancer, asthma, arthritis and depression.  One of the best steps you can take to improve your diet is to eat more foods that are rich in omega-3 fatty acids and fewer that are high in omega-6 fatty acids.  The best food sources of beneficial omega-3 fatty acids are cold-water fish, which are high in both DHA and EPA. Examples include sardines, herring, salmon and tuna. Wild-caught varieties usually are better than "farmed" fish, " "which typically are subject to higher levels of pollutants and chemicals.  The American Heart Association recommends a minimum of two servings of cold-water fish weekly to reduce the risk of cardiovascular disease, and many eye doctors likewise recommend a diet high in omega-3 fatty acids to reduce the risk of eye problems.  If you aren't a fish lover, another way to make sure your diet contains enough omega-3s it to take fish oil supplements. These are available in capsule and liquid form, and many varieties feature a "non-fishy" taste.  Other good sources of omega-3 fatty acids include flaxseeds, flaxseed oil, walnuts and dark green leafy vegetables. However, your body cannot process the ALA omega-3 fatty acids from these vegetarian sources as easily as the DHA and EPA omega-3 fatty acids found in fish.  To reduce your intake of omega-6s, avoid fried and highly processed foods. Many cooking oils, including sunflower oil and corn oil, are very high in omega-6 fatty acids. High cooking temperatures also create harmful trans-fatty acids, or "trans-fats."  Trans fats interfere with the body's absorption of beneficial omega-3 fatty acids and may contribute to a number of serious diseases, including cancer, heart disease, atherosclerosis (hardening of the arteries), high blood pressure, diabetes, obesity, arthritis and immune system disorders.  Currently, there is no Recommended Dietary Allowance (RDA) for omega-3 fatty acids. But, according to the American Heart Association, research suggests daily intakes of DHA and EPA (combined) ranging from 500 milligrams (0.5 gram) to 1.8 grams (either from fish or fish oil supplements) significantly reduces cardiac risks. For ALA, daily intakes of 1.5 to 3 grams (g) seem to be beneficial.  Foods Containing Omega-3 Essential Fatty Acids   Food DHA and EPA Omega-3s (total), grams   [place], [place] (half fillet, grilled) 3.89   Mackerel, Pacific (1 fillet, grilled) 3.25   Sardine " oil (1 tablespoon) 2.83   [place], [place] (half fillet, grilled) 2.68   Cod liver oil (1 tablespoon) 2.43   [place], pink (half fillet, grilled)  1.60   Herring oil (1 tablespoon) 1.43   Sardines, canned in oil (approx. 3 ounces) 0.90   White tuna, canned in water (approx. 3 ounces) 0.73   Source: [employer], U.[place]. Dept. of Agriculture   For a more nutritious diet and potentially better eye health, try these simple changes:  1. Replace cooking oils that are high in omega-6 fatty acids with olive oil, which has significantly lower levels of omega-6 fatty acids.  2. Eat plenty of fish, fruits and vegetables.  3. Avoid hydrogenated oils (found in many snack foods) and margarine.  4. Avoid fried foods and foods containing trans fats.  5. Limit your consumption of red meat.  Choosing a healthy diet that includes a variety of foods with plenty of omega-3 fatty acids and limiting your intake of potentially harmful omega-6 fatty acids will significantly increase your odds of a lifetime of good vision and vibrant health.   Home » Nutrition » Omega-3 Fatty Acids     About the Author: George Benjamin OD, is  of [employer]. Dr. Benjamin has more than 25 years of experience as an eye care provider, health educator and consultant to the eyewear industry. His special interests include contact lenses, nutrition and preventive vision care        Open Your Eyes to Healthy Eating Habits  NUTRITION TIPS FOR YOUR EYE SIGHT  Doctors of optometry see millions of patients a year and are the primary providers of eye and vision care in [place]. This month, in celebration of national Save Your Vision Month, the American Optometric Association (AOA), [employer] and [employer] are educating Americans on the many preventative actions they can take to protect their sight, including eating  right.    More than 43 million Americans suffer from cataracts or age-related macular degeneration (AMD), the two  leading causes of vision loss and blindness.  Research indicates that there is a strong correlation between good nutrition and the prevention of these age-related eye diseases. Eating foods rich in key nutrients - antioxidants lutein and zeaxanthin, essential fatty acids, vitamins C and E and the mineral zinc - can help protect eyesight and vision.    Fast Facts   In a recent survey conducted by the AOA, nearly three-fourths (72%) of respondents  age 55 and older began noticing changes in their vision between the ages of 40  and 45.   To cope with vision loss or various eye problems, less than one-third (29%) of  respondents are increasing their nutrient intake for healthy eyes.   Many Americans (48%) still believe that carrots are the best food for eye health,  when, in fact, spinach and other dark leafy greens are the healthiest foods for  the eyes because they naturally contain large amounts of lutein and zeaxanthin.   In order to maintain healthy eyes, studies show that 10 mg of lutein should be  consumed each day or one cup of cooked spinach four times a week.   More than 50% of Americans do not take in the recommended dosage of vitamin C  per day.   One cup (8 fl oz) of orange juice per day contains 81.6 mg/serving of vitamin C,  more than enough to help offset some eye diseases.  Visit www.AOA.org for additional information, or for vision-friendly recipes      Nutrition for Healthy Eyes  By George Benjamin OD  Research suggests that antioxidants and other important nutrients may reduce your risk of cataracts and macular degeneration. Specific antioxidants can have additional benefits as well; for example, vitamin A protects against blindness, and vitamin C may play a role in preventing or alleviating glaucoma.  Omega-3 essential fatty acids appear to help the eye in a variety of ways, from alleviating symptoms of dry eye syndrome to guarding against macular damage.  Eye Benefits of Vitamins and  Micronutrients    A healthy diet for your eyes should include plenty of colorful fruits and vegetables.   The following vitamins, minerals and other nutrients have been shown to be essential for good vision and may protect your eyes from sight-robbing conditions and diseases.  Incorporating the following foods in your diet will help you get the Recommended Dietary Allowance (RDA) of these important eye nutrients. Established by the Cope of Medicine (National Academy of Sciences), the RDA is the average daily dietary intake level of a nutrient sufficient to meet the requirements of nearly all healthy individuals in a specific life stage and gender group.  While the RDA is a useful reference, some eye care practitioners recommend higher daily intakes of certain nutrients for people at risk for eye problems.  (In the following list, mg = milligram; mcg = microgram (1/1000 of a mg) and IU = International Unit.)  Beta-carotene  Eye benefits of beta-carotene: When taken in combination with zinc and vitamins C and E, beta-carotene may reduce the progression of macular degeneration.   Food sources: Carrots, sweet potatoes, spinach, kale, butternut squash.   RDA: None (most supplements contain 5,000 to 25,000 IU).  Bioflavonoids (Flavonoids)  Eye benefits of bioflavonoids: May protect against cataracts and macular degeneration.   Food sources: Tea, red wine, citrus fruits, bilberries, blueberries, cherries, legumes, soy products.   RDA: None.  More Info   Learn more about omega-3s and save $2 on [employer]ra[employer]ears Nutrition gels   Moderate to severe dry eyes? Find out if Retaine MGD is right for you   Learn how Optometry Giving Sight helps 670 million people to see again  Lutein and Zeaxanthin  Eye benefits of lutein and zeaxanthin: May prevent cataracts and macular degeneration.   Food sources: Spinach, kale, turnip greens, cachorro greens, squash.   RDA: None.      This infographic shows which nutrients you need for good eye  health as you age. Please click here for the full image. (Image: [employer])   Omega-3 Fatty Acids  Eye benefits of omega-3 fatty acids: May help prevent macular degeneration (AMD) and dry eyes.   Food sources: Cold-water fish such as salmon, mackerel and herring; fish oil supplements, freshly ground flaxseeds, walnuts.   RDA: None; but for cardiovascular benefits, the American Heart Association recommends approximately 1,000 mg daily.  Selenium  Eye benefits of selenium: When combined with carotenoids and vitamins C and E, may reduce risk of advanced AMD.   Food sources: Seafood (shrimp, crab, salmon, halibut), [place] nuts, enriched noodles, brown rice.   RDA: 55 mcg for teens and adults (60 mcg for women during pregnancy and 70 mcg when breast-feeding).  Vitamin A  Eye benefits of vitamin A: May protect against night blindness and dry eyes.   Food sources: Beef or chicken liver; eggs, butter, milk.   RDA: 3,000 IU for men; 2,333 IU for women (2,567 IU during pregnancy and 4,333 IU when breast-feeding).  Vitamin C  Eye benefits of vitamin C: May reduce the risk of cataracts and macular degeneration.   Food sources: Sweet peppers (red or green), kale, strawberries, broccoli, oranges, cantaloupe.   RDA: 90 mg for men; 70 mg for women (85 mg during pregnancy and 120 mg when breast-feeding).  Vitamin D  Eye benefits of vitamin D: May reduce the risk of macular degeneration.   Food sources: [place], sardines, mackerel, milk; orange juice fortified with vitamin D.   RDA: None, but the American Academy of Pediatrics recommends 400 IU per day for infants, children and adolescents, and many experts recommend higher daily intakes for adults.   The best source of vitamin D is exposure to sunlight. Ultraviolet radiation from the sun stimulates production of vitamin D in human skin, and just a few minutes of exposure to sunlight each day (without sunscreen) will insure your body is producing adequate amounts of vitamin  D.  Vitamin E  Eye benefits of vitamin E: When combined with carotenoids and vitamin C, may reduce the risk of advanced AMD.   Food sources: Almonds, sunflower seeds, hazelnuts.   RDA: 15 mg for teens and adults (15 mg for women during pregnancy and 19 mg when breast-feeding).  Zinc  Eye benefits of zinc: Helps vitamin A reduce the risk of night blindness; may play a role in reducing risk of advanced AMD.   Food sources: Oysters, beef, Dungeness crab, turkey (dark meat).   RDA: 11 mg for men; 8 mg for women (11 mg during pregnancy and 12 mg when breast-feeding).  In general, it's best to obtain most nutrients through a healthy diet, including at least two servings of fish per week and plenty of colorful fruits and vegetables.  If you plan to begin a regimen of eye vitamins, be sure to discuss this with your optometrist or ophthalmologist. Taking too much of certain vision supplements can cause problems, especially if you are taking prescription medications for health problems.  Bon appétit!   Home » Nutrition » Overview     About the Author: George Benjamin, OD, is  of [email or web address]. Dr. Benjamin has more than 25 years of experience as an eye care provider, health educator and consultant to the eyewear industry. His special interests include contact lenses, nutrition and preventive vision care.      Protecting Your Eyes from Solar Radiation  The sun supports all life on our planet, but its life-giving rays also pose dangers.  The suns primary danger is in the form of Ultraviolet (UV) radiation. UV radiation is a component of solar radiation, but it can also be given off by artificial sources like welding machines, tanning beds and lasers.  Most are aware of the harm UV radiation can do to the skin, but many may not realize that exposure to UV radiation can harm the eyes or that other components of solar radiation can also affect vision.  There are three types of UV radiation: UV-C is absorbed by the  ozone layer and does not present any threat; UV-A and UV-B radiation can have adverse long- and short-term effects on the eyes and vision.  If your eyes are exposed to excessive amounts of UV radiation over a short period of time, you are likely to experience an effect called photokeratitis.    Like a sunburn of the eye, photokeratitis may be painful and include symptoms such as red eyes, a foreign body sensation or gritty feeling in the eyes, extreme sensitivity to light and excessive tearing. Fortunately, this is usually temporary and rarely causes permanent damage to the eyes.  Long-term exposure to UV radiation, however, can be more serious. Scientific studies and research have shown that exposure to small amounts of UV radiation over a period of many years increases the chance of developing a cataract and may cause damage to the retina, a nerve-rich lining of the eye that is used for seeing. Additionally, chronic exposure to shorter wavelength visible light (i.e. blue and violet light) may also be harmful to the retina.  The longer the eyes are exposed to solar radiation, the greater the risk of developing later in life such conditions as cataracts or macular degeneration. Since it is not clear how much exposure to solar radiation will cause damage, the AOA recommends wearing quality sunglasses that offer UV protection and wearing a hat or cap with a wide brim whenever you spend time outdoors.    To provide adequate protection for your eyes, sunglasses should:  block out 99 to 100 percent of both UV-A and UV-B radiation;   screen out 75 to 90 percent of visible light;   be perfectly matched in color and free of distortion and imperfection; and   have lenses that are gray for proper color recognition.  The lenses in sunglasses should be made from polycarbonate or Trivex® material if you participate in potentially eye-hazardous work or sports. These lenses provide the most impact resistance.  If you spend a lot  of time outdoors in bright sunlight, wrap around frames can provide additional protection from the harmful solar radiation.  Dont forget protection for children and teenagers. They typically spend more time in the sun than adults.

## 2019-11-14 NOTE — PROGRESS NOTES
"HPI     Eden Kuhn is a 59 y.o. female who comes in for urgent eye care.    She reports having a large "stye" on her right upper eyelid 2- 3 months   ago. She was prescribed bactrim by her endocrinologist.  This, along with   warm compresses, and cessation of eye makeup (mascara and eyeliner), and   use of Similisan stye relief resolved symptoms.  She further explains that   after that, smaller white bumps would appear on her lower eyelid margin   (right eye then left) which was followed by a larger bump on her left   lower eyelid margin. A second round of bactrim was taken. There are no   active stye/bumps at this time, however, she is concerned about the   etiology and ramifications of this.     When asked about her vision, she reports that she wears OTC readers for   all near work.  She adds that she no longer drives at night because her   depth perception (when driving at night) is "off." During daylight hours,   she has not noticed any concerning visual signs or symptoms.     (--)blurred vision  (--)Headaches  (--)diplopia  (--)flashes  (--)floaters  (--)pain  (--)Itching  (--)tearing  (--)burning  (--)Dryness  (--) OTC Drops  (--)Photophobia      Last edited by Waleska Medina, OD on 11/14/2019 12:15 PM. (History)        Review of Systems   Constitutional: Negative for chills, fever and malaise/fatigue.   HENT: Negative for congestion and hearing loss.    Eyes: Positive for blurred vision. Negative for double vision, photophobia, pain, discharge and redness.        Eyelid bumps   Respiratory: Negative.    Cardiovascular: Negative.    Gastrointestinal: Negative.    Genitourinary: Negative.    Musculoskeletal: Negative.    Skin: Negative.    Neurological: Negative for seizures.   Endo/Heme/Allergies: Negative for environmental allergies.   Psychiatric/Behavioral: Negative.        For exam results, see encounter report    Assessment /Plan     1. Meibomian gland dysfunction (MGD) of upper and lower lids of " both eyes  - Use Ocusoft Plus Eyelid Cleanser (or Systane Lid Wipes) to clean the eyelid and eyelash margins of both eyes twice a day.  - Use hot compresses to both eyelids, 10 minutes once a day.  - Rec Omega 3 supplement: 2000 - 3000 mg daily by mouth    2. Hyperopia of both eyes with Presbyopia  - Uncorrected refractive error causing poor vision at night  - Advised on option of glasses (can be filled as needed)  Glasses Prescription (11/14/2019)        Sphere Cylinder Dist VA Add    Right +0.75 Sphere 20/20 +2.50    Left +0.50 Sphere 20/20 +2.50    Type:  PAL    Expiration Date:  11/14/2020        3.  Retinal Health intact OU  - Return to clinic immediately with any new spontaneous flashes of light, a vail of gray, black or other color come over  vision, or any new floaters    Patient education; RTC in 1 year with DFE; Ok to instill 0.5% Tropicamide after (normal) baseline workup, sooner as needed

## 2019-11-21 ENCOUNTER — TELEPHONE (OUTPATIENT)
Dept: OPTOMETRY | Facility: CLINIC | Age: 59
End: 2019-11-21

## 2019-11-21 DIAGNOSIS — H02.88B MEIBOMIAN GLAND DYSFUNCTION (MGD) OF UPPER AND LOWER LIDS OF BOTH EYES: Primary | ICD-10-CM

## 2019-11-21 DIAGNOSIS — H02.88A MEIBOMIAN GLAND DYSFUNCTION (MGD) OF UPPER AND LOWER LIDS OF BOTH EYES: Primary | ICD-10-CM

## 2019-11-21 RX ORDER — DOXYCYCLINE 100 MG/1
CAPSULE ORAL
Qty: 40 CAPSULE | Refills: 0 | Status: SHIPPED | OUTPATIENT
Start: 2019-11-21 | End: 2019-12-20

## 2019-11-21 RX ORDER — ALBUTEROL SULFATE 90 UG/1
2 AEROSOL, METERED RESPIRATORY (INHALATION) EVERY 6 HOURS PRN
Refills: 6 | COMMUNITY
Start: 2019-11-13

## 2019-11-21 NOTE — TELEPHONE ENCOUNTER
Talked to pt about current condition ( reoccurring styes on both eyes). She reports that she has another garbanzo bean shaped/sized bump under her left upper eyelid which did not changed after treatment with warm compresses and stye reliefe.

## 2020-10-07 ENCOUNTER — OFFICE VISIT (OUTPATIENT)
Dept: URGENT CARE | Facility: CLINIC | Age: 60
End: 2020-10-07
Payer: COMMERCIAL

## 2020-10-07 VITALS
SYSTOLIC BLOOD PRESSURE: 113 MMHG | DIASTOLIC BLOOD PRESSURE: 68 MMHG | WEIGHT: 110 LBS | HEIGHT: 65 IN | RESPIRATION RATE: 18 BRPM | BODY MASS INDEX: 18.33 KG/M2 | OXYGEN SATURATION: 99 % | TEMPERATURE: 100 F | HEART RATE: 72 BPM

## 2020-10-07 DIAGNOSIS — Z11.59 SCREENING FOR VIRAL DISEASE: ICD-10-CM

## 2020-10-07 DIAGNOSIS — J06.9 VIRAL URI: Primary | ICD-10-CM

## 2020-10-07 DIAGNOSIS — J02.9 SORE THROAT: ICD-10-CM

## 2020-10-07 DIAGNOSIS — R05.9 COUGH: ICD-10-CM

## 2020-10-07 LAB
CTP QC/QA: YES
SARS-COV-2 RDRP RESP QL NAA+PROBE: NEGATIVE

## 2020-10-07 PROCEDURE — U0002 COVID-19 LAB TEST NON-CDC: HCPCS | Mod: QW,S$GLB,, | Performed by: NURSE PRACTITIONER

## 2020-10-07 PROCEDURE — 99203 OFFICE O/P NEW LOW 30 MIN: CPT | Mod: S$GLB,,, | Performed by: NURSE PRACTITIONER

## 2020-10-07 PROCEDURE — U0002: ICD-10-PCS | Mod: QW,S$GLB,, | Performed by: NURSE PRACTITIONER

## 2020-10-07 PROCEDURE — 99203 PR OFFICE/OUTPT VISIT, NEW, LEVL III, 30-44 MIN: ICD-10-PCS | Mod: S$GLB,,, | Performed by: NURSE PRACTITIONER

## 2020-10-07 NOTE — PROGRESS NOTES
"Subjective:       Patient ID: Eden Kuhn is a 60 y.o. female.    Vitals:  height is 5' 5" (1.651 m) and weight is 49.9 kg (110 lb). Her temperature is 99.8 °F (37.7 °C). Her blood pressure is 113/68 and her pulse is 72. Her respiration is 18 and oxygen saturation is 99%.     Chief Complaint: COVID-19 Concerns    Pt reports productive cough, sore throat, body aches, post-nasal drip, fatigue intermittently x two weeks. No fever, chills, CP, SOB, abd pain, n/v/d, anosmia. Her  recently had a cough as well. Her symptoms are mild and not bothersome but she is here to r/o covid.     Cough  This is a new problem. The current episode started 1 to 4 weeks ago (2 weeks). The problem has been gradually worsening. The problem occurs constantly. The cough is non-productive. Associated symptoms include myalgias and a sore throat. Pertinent negatives include no chills, ear pain, eye redness, fever, hemoptysis, rash, shortness of breath or wheezing. Nothing aggravates the symptoms. She has tried nothing for the symptoms. There is no history of asthma, bronchitis or pneumonia.       Constitution: Positive for fatigue. Negative for chills, sweating and fever.   HENT: Positive for sore throat. Negative for ear pain, congestion, sinus pain, sinus pressure and voice change.    Neck: Negative for painful lymph nodes.   Eyes: Negative for eye redness.   Respiratory: Positive for cough. Negative for chest tightness, sputum production, bloody sputum, COPD, shortness of breath, stridor, wheezing and asthma.    Gastrointestinal: Negative for nausea and vomiting.   Musculoskeletal: Positive for muscle ache.   Skin: Negative for rash.   Allergic/Immunologic: Negative for seasonal allergies and asthma.   Hematologic/Lymphatic: Negative for swollen lymph nodes.       Objective:      Physical Exam   Constitutional: She is oriented to person, place, and time. She appears well-developed. She is cooperative.  Non-toxic appearance. She " does not appear ill. No distress.   HENT:   Head: Normocephalic and atraumatic.   Ears:   Right Ear: Hearing, tympanic membrane, external ear and ear canal normal.   Left Ear: Hearing, tympanic membrane, external ear and ear canal normal.   Nose: Nose normal. No mucosal edema, rhinorrhea or nasal deformity. No epistaxis. Right sinus exhibits no maxillary sinus tenderness and no frontal sinus tenderness. Left sinus exhibits no maxillary sinus tenderness and no frontal sinus tenderness.   Mouth/Throat: Uvula is midline, oropharynx is clear and moist and mucous membranes are normal. Mucous membranes are moist. No trismus in the jaw. Normal dentition. No uvula swelling. No oropharyngeal exudate, posterior oropharyngeal edema, posterior oropharyngeal erythema or cobblestoning. No tonsillar exudate. Oropharynx is clear.   Eyes: Pupils are equal, round, and reactive to light. Conjunctivae and lids are normal. No scleral icterus.   Neck: Trachea normal, normal range of motion, full passive range of motion without pain and phonation normal. Neck supple. No neck rigidity. No edema and no erythema present.   Cardiovascular: Normal rate, regular rhythm, normal heart sounds and normal pulses.   Pulmonary/Chest: Effort normal and breath sounds normal. No respiratory distress. She has no decreased breath sounds. She has no wheezes. She has no rhonchi. She has no rales.   Abdominal: Normal appearance.   Musculoskeletal: Normal range of motion.         General: No deformity.   Lymphadenopathy:     She has no cervical adenopathy.   Neurological: She is alert and oriented to person, place, and time. She exhibits normal muscle tone. Coordination normal.   Skin: Skin is warm, dry, intact, not diaphoretic and not pale. Psychiatric: Her speech is normal and behavior is normal. Judgment and thought content normal.   Nursing note and vitals reviewed.    Results for orders placed or performed in visit on 10/07/20   POCT COVID-19 Rapid  Screening   Result Value Ref Range    POC Rapid COVID Negative Negative     Acceptable Yes            Assessment:       1. Viral URI    2. Screening for viral disease    3. Cough    4. Sore throat        Plan:         Viral URI    Screening for viral disease  -     POCT COVID-19 Rapid Screening    Cough    Sore throat         Reviewed previous pertinent office visits, PMH, PSH, fam hx  We discussed that this is likely a viral illness and will not benefit from antibiotics. Symptomatic care recommended.   Recommended otc motrin or tylenol as needed for fever/aches unless contraindicated  F/u with PCP if no improvement  Advised on return/follow-up precautions. Advised on ER precautions. Answered all patient questions. Patient verbalized understanding and voiced agreement with current treatment plan.    There are no Patient Instructions on file for this visit.

## 2020-12-14 ENCOUNTER — OFFICE VISIT (OUTPATIENT)
Dept: URGENT CARE | Facility: CLINIC | Age: 60
End: 2020-12-14
Payer: COMMERCIAL

## 2020-12-14 VITALS
DIASTOLIC BLOOD PRESSURE: 77 MMHG | TEMPERATURE: 97 F | SYSTOLIC BLOOD PRESSURE: 151 MMHG | HEART RATE: 88 BPM | HEIGHT: 65 IN | RESPIRATION RATE: 18 BRPM | BODY MASS INDEX: 18.33 KG/M2 | OXYGEN SATURATION: 96 % | WEIGHT: 110 LBS

## 2020-12-14 DIAGNOSIS — J02.9 SORE THROAT: Primary | ICD-10-CM

## 2020-12-14 DIAGNOSIS — R53.83 FATIGUE, UNSPECIFIED TYPE: ICD-10-CM

## 2020-12-14 DIAGNOSIS — J06.9 ACUTE URI: ICD-10-CM

## 2020-12-14 LAB
CTP QC/QA: YES
SARS-COV-2 RDRP RESP QL NAA+PROBE: NEGATIVE

## 2020-12-14 PROCEDURE — 3008F PR BODY MASS INDEX (BMI) DOCUMENTED: ICD-10-PCS | Mod: CPTII,S$GLB,, | Performed by: FAMILY MEDICINE

## 2020-12-14 PROCEDURE — 99214 PR OFFICE/OUTPT VISIT, EST, LEVL IV, 30-39 MIN: ICD-10-PCS | Mod: S$GLB,,, | Performed by: FAMILY MEDICINE

## 2020-12-14 PROCEDURE — 99214 OFFICE O/P EST MOD 30 MIN: CPT | Mod: S$GLB,,, | Performed by: FAMILY MEDICINE

## 2020-12-14 PROCEDURE — U0002 COVID-19 LAB TEST NON-CDC: HCPCS | Mod: QW,S$GLB,, | Performed by: FAMILY MEDICINE

## 2020-12-14 PROCEDURE — U0002: ICD-10-PCS | Mod: QW,S$GLB,, | Performed by: FAMILY MEDICINE

## 2020-12-14 PROCEDURE — 3008F BODY MASS INDEX DOCD: CPT | Mod: CPTII,S$GLB,, | Performed by: FAMILY MEDICINE

## 2020-12-14 NOTE — PATIENT INSTRUCTIONS
PLEASE READ YOUR DISCHARGE INSTRUCTIONS ENTIRELY AS IT CONTAINS IMPORTANT INFORMATION.    Please return here or go to the Emergency Department for any concerns or worsening of condition.      If not allergic, please take over the counter Tylenol (Acetaminophen) and/or Motrin (Ibuprofen) as directed for control of pain and/or fever.  Please follow up with your primary care doctor or specialist as needed.      If you smoke, please stop smoking.    Please return or see your primary care doctor if you develop new or worsening symptoms.     Please arrange follow up with your primary medical clinic as soon as possible. You must understand that you've received an Urgent Care treatment only and that you may be released before all of your medical problems are known or treated. You, the patient, will arrange for follow up as instructed. If your symptoms worsen or fail to improve you should go to the Emergency Room.  Viral Upper Respiratory Illness (Adult)  You have a viral upper respiratory illness (URI), which is another term for the common cold. This illness is contagious during the first few days. It is spread through the air by coughing and sneezing. It may also be spread by direct contact (touching the sick person and then touching your own eyes, nose, or mouth). Frequent handwashing will decrease risk of spread. Most viral illnesses go away within 7 to 10 days with rest and simple home remedies. Sometimes the illness may last for several weeks. Antibiotics will not kill a virus, and they are generally not prescribed for this condition.    Home care  · If symptoms are severe, rest at home for the first 2 to 3 days. When you resume activity, don't let yourself get too tired.  · Avoid being exposed to cigarette smoke (yours or others).  · You may use acetaminophen or ibuprofen to control pain and fever, unless another medicine was prescribed. (Note: If you have chronic liver or kidney disease, have ever had a stomach  ulcer or gastrointestinal bleeding, or are taking blood-thinning medicines, talk with your healthcare provider before using these medicines.) Aspirin should never be given to anyone under 18 years of age who is ill with a viral infection or fever. It may cause severe liver or brain damage.  · Your appetite may be poor, so a light diet is fine. Avoid dehydration by drinking 6 to 8 glasses of fluids per day (water, soft drinks, juices, tea, or soup). Extra fluids will help loosen secretions in the nose and lungs.  · Over-the-counter cold medicines will not shorten the length of time youre sick, but they may be helpful for the following symptoms: cough, sore throat, and nasal and sinus congestion. (Note: Do not use decongestants if you have high blood pressure.)  Follow-up care  Follow up with your healthcare provider, or as advised.  When to seek medical advice  Call your healthcare provider right away if any of these occur:  · Cough with lots of colored sputum (mucus)  · Severe headache; face, neck, or ear pain  · Difficulty swallowing due to throat pain  · Fever of 100.4°F (38°C)  Call 911, or get immediate medical care  Call emergency services right away if any of these occur:  · Chest pain, shortness of breath, wheezing, or difficulty breathing  · Coughing up blood  · Inability to swallow due to throat pain  Date Last Reviewed: 9/13/2015  © 7885-2606 [employer]. [address] [place] [address]. All rights reserved. This information is not intended as a substitute for professional medical care. Always follow your healthcare professional's instructions.        Self-Care for Sore Throats    Sore throats happen for many reasons, such as colds, allergies, and infections caused by viruses or bacteria. In any case, your throat becomes red and sore. Your goal for self-care is to reduce your discomfort while giving your throat a chance to heal.  Moisten and soothe your throat  Tips include the  following:  · Try a sip of water first thing after waking up.  · Keep your throat moist by drinking 6 or more glasses of clear liquids every day.  · Run a cool-air humidifier in your room overnight.  · Avoid cigarette smoke.   · Suck on throat lozenges, cough drops, hard candy, ice chips, or frozen fruit-juice bars. Use the sugar-free versions if your diet or medical condition requires them.  Gargle to ease irritation  Gargling every hour or 2 can ease irritation. Try gargling with 1 of these solutions:  · 1/4 teaspoon of salt in 1/2 cup of warm water  · An over-the-counter anesthetic gargle  Use medicine for more relief  Over-the-counter medicine can reduce sore throat symptoms. Ask your pharmacist if you have questions about which medicine to use:  · Ease pain with anesthetic sprays. Aspirin or an aspirin substitute also helps. Remember, never give aspirin to anyone 18 or younger, or if you are already taking blood thinners.   · For sore throats caused by allergies, try antihistamines to block the allergic reaction.  · Remember: unless a sore throat is caused by a bacterial infection, antibiotics wont help you.  Prevent future sore throats  Prevention tips include the following:  · Stop smoking or reduce contact with secondhand smoke. Smoke irritates the tender throat lining.  · Limit contact with pets and with allergy-causing substances, such as pollen and mold.  · When youre around someone with a sore throat or cold, wash your hands often to keep viruses or bacteria from spreading.  · Dont strain your vocal cords.  Call your healthcare provider  Contact your healthcare provider if you have:  · A temperature over 101°F (38.3°C)  · White spots on the throat  · Great difficulty swallowing  · Trouble breathing  · A skin rash  · Recent exposure to someone else with strep bacteria  · Severe hoarseness and swollen glands in the neck or jaw   Date Last Reviewed: 8/1/2016  © 4729-0181 [employer] [address] [id number]. All rights reserved. This information is not intended as a substitute for professional medical care. Always follow your healthcare professional's instructions.        Weakness (Uncertain Cause)  Based on your exam today, the exact cause of your weakness is not certain. However, your weakness does not seem to be a sign of a serious illness at this time. Keep an eye on your symptoms and get medical advice as instructed below.  Home care  · Rest at home today. Do not over-exert yourself.  · Take any medicine as prescribed.  · For the next few days, drink extra fluids (unless your healthcare provider wants you to restrict fluids for other reasons). Do not skip meals.  Follow-up care  Follow up with your healthcare provider or as advised.  When to seek medical advice  Call your healthcare provider for any of the following  · Worsening of your symptoms  · Symptoms don't start getting better within 2 days  · Fever of 100.4º F (38º C) or higher, or as directed by your healthcare provider·    Call 911  Get emergency medical care for any of these:  · Chest, arm, neck, jaw or upper back pain  · Trouble breathing  · Numbness or weakness of the face, one arm or one leg  · Slurred speech, confusion, trouble speaking, walking or seeing  · Blood in vomit or stool (black or red color)  · Loss of consciousness  Date Last Reviewed: 6/10/2015  © 8735-3276 [employer]. [address] [place] [address]. All rights reserved. This information is not intended as a substitute for professional medical care. Always follow your healthcare professional's instructions.

## 2020-12-14 NOTE — PROGRESS NOTES
"Subjective:       Patient ID: Eden Kuhn is a 60 y.o. female.    Vitals:  height is 5' 5" (1.651 m) and weight is 49.9 kg (110 lb 0.2 oz). Her temperature is 97.3 °F (36.3 °C). Her blood pressure is 151/77 (abnormal) and her pulse is 88. Her respiration is 18 and oxygen saturation is 96%.     Chief Complaint: COVID-19 Concerns    Patient is here for COVID testing.  Reports her  was tested positive last week.  Pt c/o sore throat, fatigue and mild body aches for 2 days.  Denies fever, chills, chest pain, shortness of breath, headache, nausea/vomiting, diarrhea, abdominal pain, dysuria, loss of smell or taste.       Constitution: Positive for fatigue. Negative for chills and fever.   HENT: Positive for sore throat. Negative for congestion.    Neck: Negative for painful lymph nodes.   Cardiovascular: Negative for chest pain and leg swelling.   Eyes: Negative for double vision and blurred vision.   Respiratory: Negative for cough and shortness of breath.    Gastrointestinal: Negative for nausea, vomiting and diarrhea.   Genitourinary: Negative for dysuria, frequency, urgency and history of kidney stones.   Musculoskeletal: Negative for joint pain, joint swelling, muscle cramps and muscle ache.   Skin: Negative for color change, pale, rash and bruising.   Allergic/Immunologic: Negative for seasonal allergies.   Neurological: Negative for dizziness, history of vertigo, light-headedness, passing out and headaches.   Hematologic/Lymphatic: Negative for swollen lymph nodes.   Psychiatric/Behavioral: Negative for nervous/anxious, sleep disturbance and depression. The patient is not nervous/anxious.        Objective:      Physical Exam   Constitutional: She is oriented to person, place, and time. She appears well-developed. She is cooperative.  Non-toxic appearance. She does not appear ill. No distress.   HENT:   Head: Normocephalic and atraumatic.   Ears:   Right Ear: Hearing, tympanic membrane, external ear and " ear canal normal. impacted cerumen  Left Ear: Hearing, tympanic membrane, external ear and ear canal normal. impacted cerumen  Nose: Congestion present. No mucosal edema, rhinorrhea or nasal deformity. No epistaxis. Right sinus exhibits no maxillary sinus tenderness and no frontal sinus tenderness. Left sinus exhibits no maxillary sinus tenderness and no frontal sinus tenderness.   Mouth/Throat: Uvula is midline and mucous membranes are normal. No trismus in the jaw. Normal dentition. No uvula swelling. Posterior oropharyngeal erythema present. No oropharyngeal exudate.      Comments: Positive slight pharyngeal erythema without tonsillar swelling or exudates.  Eyes: Conjunctivae and lids are normal. Right eye exhibits no discharge. Left eye exhibits no discharge. No scleral icterus.   Neck: Trachea normal, normal range of motion, full passive range of motion without pain and phonation normal. Neck supple. No muscular tenderness present.   Cardiovascular: Normal rate, regular rhythm, normal heart sounds and normal pulses.   No murmur heard.  Pulmonary/Chest: Effort normal and breath sounds normal. No stridor. No respiratory distress. She has no wheezes. She has no rhonchi. She has no rales. She exhibits no tenderness.   Abdominal: Soft. Normal appearance and bowel sounds are normal. She exhibits no distension, no pulsatile midline mass and no mass. There is no abdominal tenderness. There is no rebound, no guarding, no left CVA tenderness and no right CVA tenderness.   Musculoskeletal: Normal range of motion.         General: No deformity.   Lymphadenopathy:     She has no cervical adenopathy.   Neurological: She is alert and oriented to person, place, and time. She exhibits normal muscle tone. Coordination normal.   Skin: Skin is warm, dry, intact, not diaphoretic and not pale. Psychiatric: Her speech is normal and behavior is normal. Judgment and thought content normal.   Nursing note and vitals reviewed.         Assessment:       1. Sore throat    2. Acute URI    3. Fatigue, unspecified type        Plan:         Sore throat  -     POCT COVID-19 Rapid Screening    Acute URI    Fatigue, unspecified type        PLEASE READ YOUR DISCHARGE INSTRUCTIONS ENTIRELY AS IT CONTAINS IMPORTANT INFORMATION.    Please return here or go to the Emergency Department for any concerns or worsening of condition.      If not allergic, please take over the counter Tylenol (Acetaminophen) and/or Motrin (Ibuprofen) as directed for control of pain and/or fever.  Please follow up with your primary care doctor or specialist as needed.      If you smoke, please stop smoking.    Please return or see your primary care doctor if you develop new or worsening symptoms.     Please arrange follow up with your primary medical clinic as soon as possible. You must understand that you've received an Urgent Care treatment only and that you may be released before all of your medical problems are known or treated. You, the patient, will arrange for follow up as instructed. If your symptoms worsen or fail to improve you should go to the Emergency Room.  Viral Upper Respiratory Illness (Adult)  You have a viral upper respiratory illness (URI), which is another term for the common cold. This illness is contagious during the first few days. It is spread through the air by coughing and sneezing. It may also be spread by direct contact (touching the sick person and then touching your own eyes, nose, or mouth). Frequent handwashing will decrease risk of spread. Most viral illnesses go away within 7 to 10 days with rest and simple home remedies. Sometimes the illness may last for several weeks. Antibiotics will not kill a virus, and they are generally not prescribed for this condition.    Home care  · If symptoms are severe, rest at home for the first 2 to 3 days. When you resume activity, don't let yourself get too tired.  · Avoid being exposed to cigarette smoke (yours  or others).  · You may use acetaminophen or ibuprofen to control pain and fever, unless another medicine was prescribed. (Note: If you have chronic liver or kidney disease, have ever had a stomach ulcer or gastrointestinal bleeding, or are taking blood-thinning medicines, talk with your healthcare provider before using these medicines.) Aspirin should never be given to anyone under 18 years of age who is ill with a viral infection or fever. It may cause severe liver or brain damage.  · Your appetite may be poor, so a light diet is fine. Avoid dehydration by drinking 6 to 8 glasses of fluids per day (water, soft drinks, juices, tea, or soup). Extra fluids will help loosen secretions in the nose and lungs.  · Over-the-counter cold medicines will not shorten the length of time youre sick, but they may be helpful for the following symptoms: cough, sore throat, and nasal and sinus congestion. (Note: Do not use decongestants if you have high blood pressure.)  Follow-up care  Follow up with your healthcare provider, or as advised.  When to seek medical advice  Call your healthcare provider right away if any of these occur:  · Cough with lots of colored sputum (mucus)  · Severe headache; face, neck, or ear pain  · Difficulty swallowing due to throat pain  · Fever of 100.4°F (38°C)  Call 911, or get immediate medical care  Call emergency services right away if any of these occur:  · Chest pain, shortness of breath, wheezing, or difficulty breathing  · Coughing up blood  · Inability to swallow due to throat pain  Date Last Reviewed: 9/13/2015 © 2000-2017 [employer]. [address] [place] [address], [place] [address]. All rights reserved. This information is not intended as a substitute for professional medical care. Always follow your healthcare professional's instructions.        Self-Care for Sore Throats    Sore throats happen for many reasons, such as colds, allergies, and infections caused by viruses or  bacteria. In any case, your throat becomes red and sore. Your goal for self-care is to reduce your discomfort while giving your throat a chance to heal.  Moisten and soothe your throat  Tips include the following:  · Try a sip of water first thing after waking up.  · Keep your throat moist by drinking 6 or more glasses of clear liquids every day.  · Run a cool-air humidifier in your room overnight.  · Avoid cigarette smoke.   · Suck on throat lozenges, cough drops, hard candy, ice chips, or frozen fruit-juice bars. Use the sugar-free versions if your diet or medical condition requires them.  Gargle to ease irritation  Gargling every hour or 2 can ease irritation. Try gargling with 1 of these solutions:  · 1/4 teaspoon of salt in 1/2 cup of warm water  · An over-the-counter anesthetic gargle  Use medicine for more relief  Over-the-counter medicine can reduce sore throat symptoms. Ask your pharmacist if you have questions about which medicine to use:  · Ease pain with anesthetic sprays. Aspirin or an aspirin substitute also helps. Remember, never give aspirin to anyone 18 or younger, or if you are already taking blood thinners.   · For sore throats caused by allergies, try antihistamines to block the allergic reaction.  · Remember: unless a sore throat is caused by a bacterial infection, antibiotics wont help you.  Prevent future sore throats  Prevention tips include the following:  · Stop smoking or reduce contact with secondhand smoke. Smoke irritates the tender throat lining.  · Limit contact with pets and with allergy-causing substances, such as pollen and mold.  · When youre around someone with a sore throat or cold, wash your hands often to keep viruses or bacteria from spreading.  · Dont strain your vocal cords.  Call your healthcare provider  Contact your healthcare provider if you have:  · A temperature over 101°F (38.3°C)  · White spots on the throat  · Great difficulty swallowing  · Trouble  breathing  · A skin rash  · Recent exposure to someone else with strep bacteria  · Severe hoarseness and swollen glands in the neck or jaw   Date Last Reviewed: 8/1/2016  © 3883-8066 [employer]. [address] [place] [address]. All rights reserved. This information is not intended as a substitute for professional medical care. Always follow your healthcare professional's instructions.        Weakness (Uncertain Cause)  Based on your exam today, the exact cause of your weakness is not certain. However, your weakness does not seem to be a sign of a serious illness at this time. Keep an eye on your symptoms and get medical advice as instructed below.  Home care  · Rest at home today. Do not over-exert yourself.  · Take any medicine as prescribed.  · For the next few days, drink extra fluids (unless your healthcare provider wants you to restrict fluids for other reasons). Do not skip meals.  Follow-up care  Follow up with your healthcare provider or as advised.  When to seek medical advice  Call your healthcare provider for any of the following  · Worsening of your symptoms  · Symptoms don't start getting better within 2 days  · Fever of 100.4º F (38º C) or higher, or as directed by your healthcare provider·    Call 911  Get emergency medical care for any of these:  · Chest, arm, neck, jaw or upper back pain  · Trouble breathing  · Numbness or weakness of the face, one arm or one leg  · Slurred speech, confusion, trouble speaking, walking or seeing  · Blood in vomit or stool (black or red color)  · Loss of consciousness  Date Last Reviewed: 6/10/2015  © 9665-7623 [employer]. [address] [place] [address]. All rights reserved. This information is not intended as a substitute for professional medical care. Always follow your healthcare professional's instructions.      You have tested negative for COVID-19 today.  If you did not have any close exposure as defined below,  then effective today, you can return to your normal daily activities including social distancing, wearing masks, and frequent handwashing.    A close exposure is defined as anyone who had a masked or an unmasked exposure to a known COVID -19 positive person, at less than 6 ft for more than 15 minutes.  If your exposure meets this definition, then you are required to quarantine for 14 days per the CDC.    The 14 day quarantine begins from the day you were exposed, not the day of your test.  For example, if your exposure was on a Monday, and you waited until Friday of the same week to get tested and it was negative, your 14 day quarantine begins from that Monday, not the Friday you tested negative.    If you developed symptoms since the exposure, and your test was negative today, you still have to quarantine for 14 days from the date of the exposure.    So if you meet the definition of a close exposure, A NEGATIVE TEST DOES NOT GET YOU OUT OF 14 DAYS OF QUARANTINE!

## 2021-04-08 ENCOUNTER — TELEPHONE (OUTPATIENT)
Dept: CARDIOLOGY | Facility: CLINIC | Age: 61
End: 2021-04-08

## 2021-04-08 DIAGNOSIS — Z76.89 ENCOUNTER TO ESTABLISH CARE: Primary | ICD-10-CM

## 2021-04-10 ENCOUNTER — OFFICE VISIT (OUTPATIENT)
Dept: URGENT CARE | Facility: CLINIC | Age: 61
End: 2021-04-10
Payer: COMMERCIAL

## 2021-04-10 VITALS
HEART RATE: 81 BPM | WEIGHT: 110 LBS | RESPIRATION RATE: 16 BRPM | OXYGEN SATURATION: 100 % | HEIGHT: 65 IN | TEMPERATURE: 98 F | BODY MASS INDEX: 18.33 KG/M2 | DIASTOLIC BLOOD PRESSURE: 80 MMHG | SYSTOLIC BLOOD PRESSURE: 130 MMHG

## 2021-04-10 DIAGNOSIS — R07.9 CHEST PAIN, UNSPECIFIED TYPE: Primary | ICD-10-CM

## 2021-04-10 DIAGNOSIS — J20.9 ACUTE BRONCHITIS, UNSPECIFIED ORGANISM: ICD-10-CM

## 2021-04-10 DIAGNOSIS — R05.9 COUGH: ICD-10-CM

## 2021-04-10 LAB
CTP QC/QA: YES
SARS-COV-2 RDRP RESP QL NAA+PROBE: NEGATIVE

## 2021-04-10 PROCEDURE — 3008F PR BODY MASS INDEX (BMI) DOCUMENTED: ICD-10-PCS | Mod: CPTII,S$GLB,, | Performed by: FAMILY MEDICINE

## 2021-04-10 PROCEDURE — 71046 XR CHEST PA AND LATERAL: ICD-10-PCS | Mod: S$GLB,,, | Performed by: RADIOLOGY

## 2021-04-10 PROCEDURE — 3008F BODY MASS INDEX DOCD: CPT | Mod: CPTII,S$GLB,, | Performed by: FAMILY MEDICINE

## 2021-04-10 PROCEDURE — U0002: ICD-10-PCS | Mod: QW,S$GLB,, | Performed by: FAMILY MEDICINE

## 2021-04-10 PROCEDURE — 93010 ELECTROCARDIOGRAM REPORT: CPT | Mod: S$GLB,,, | Performed by: INTERNAL MEDICINE

## 2021-04-10 PROCEDURE — 71046 X-RAY EXAM CHEST 2 VIEWS: CPT | Mod: S$GLB,,, | Performed by: RADIOLOGY

## 2021-04-10 PROCEDURE — 93005 ELECTROCARDIOGRAM TRACING: CPT | Mod: S$GLB,,, | Performed by: NURSE PRACTITIONER

## 2021-04-10 PROCEDURE — 99214 OFFICE O/P EST MOD 30 MIN: CPT | Mod: S$GLB,,, | Performed by: FAMILY MEDICINE

## 2021-04-10 PROCEDURE — 99214 PR OFFICE/OUTPT VISIT, EST, LEVL IV, 30-39 MIN: ICD-10-PCS | Mod: S$GLB,,, | Performed by: FAMILY MEDICINE

## 2021-04-10 PROCEDURE — U0002 COVID-19 LAB TEST NON-CDC: HCPCS | Mod: QW,S$GLB,, | Performed by: FAMILY MEDICINE

## 2021-04-10 PROCEDURE — 93005 EKG 12-LEAD: ICD-10-PCS | Mod: S$GLB,,, | Performed by: NURSE PRACTITIONER

## 2021-04-10 PROCEDURE — 93010 EKG 12-LEAD: ICD-10-PCS | Mod: S$GLB,,, | Performed by: INTERNAL MEDICINE

## 2021-04-10 RX ORDER — DOXYCYCLINE HYCLATE 100 MG
100 TABLET ORAL 2 TIMES DAILY
Qty: 20 TABLET | Refills: 0 | Status: SHIPPED | OUTPATIENT
Start: 2021-04-10 | End: 2021-04-20

## 2021-04-10 RX ORDER — PROMETHAZINE HYDROCHLORIDE AND DEXTROMETHORPHAN HYDROBROMIDE 6.25; 15 MG/5ML; MG/5ML
5 SYRUP ORAL 3 TIMES DAILY PRN
Qty: 180 ML | Refills: 0 | Status: SHIPPED | OUTPATIENT
Start: 2021-04-10 | End: 2021-04-20

## 2021-04-11 ENCOUNTER — TELEPHONE (OUTPATIENT)
Dept: URGENT CARE | Facility: CLINIC | Age: 61
End: 2021-04-11

## 2022-01-09 ENCOUNTER — HOSPITAL ENCOUNTER (INPATIENT)
Facility: HOSPITAL | Age: 62
LOS: 1 days | Discharge: HOME HEALTH | DRG: 640 | End: 2022-01-11
Attending: EMERGENCY MEDICINE | Admitting: EMERGENCY MEDICINE
Payer: COMMERCIAL

## 2022-01-09 DIAGNOSIS — R07.9 CHEST PAIN: ICD-10-CM

## 2022-01-09 DIAGNOSIS — R41.82 AMS (ALTERED MENTAL STATUS): ICD-10-CM

## 2022-01-09 DIAGNOSIS — R41.82 ALTERED MENTAL STATUS, UNSPECIFIED ALTERED MENTAL STATUS TYPE: Primary | ICD-10-CM

## 2022-01-09 DIAGNOSIS — R46.89 ALTERED BEHAVIOR: ICD-10-CM

## 2022-01-09 LAB
ALBUMIN SERPL BCP-MCNC: 4 G/DL (ref 3.5–5.2)
ALP SERPL-CCNC: 46 U/L (ref 55–135)
ALT SERPL W/O P-5'-P-CCNC: 16 U/L (ref 10–44)
AMPHET+METHAMPHET UR QL: NEGATIVE
ANION GAP SERPL CALC-SCNC: 11 MMOL/L (ref 8–16)
APAP SERPL-MCNC: <3 UG/ML (ref 10–20)
AST SERPL-CCNC: 25 U/L (ref 10–40)
BACTERIA #/AREA URNS AUTO: ABNORMAL /HPF
BARBITURATES UR QL SCN>200 NG/ML: NEGATIVE
BASOPHILS # BLD AUTO: 0.05 K/UL (ref 0–0.2)
BASOPHILS NFR BLD: 0.6 % (ref 0–1.9)
BENZODIAZ UR QL SCN>200 NG/ML: NEGATIVE
BILIRUB SERPL-MCNC: 0.5 MG/DL (ref 0.1–1)
BILIRUB UR QL STRIP: NEGATIVE
BUN SERPL-MCNC: 4 MG/DL (ref 8–23)
BZE UR QL SCN: NEGATIVE
CALCIUM SERPL-MCNC: 8.9 MG/DL (ref 8.7–10.5)
CANNABINOIDS UR QL SCN: ABNORMAL
CHLORIDE SERPL-SCNC: 97 MMOL/L (ref 95–110)
CLARITY UR REFRACT.AUTO: ABNORMAL
CO2 SERPL-SCNC: 21 MMOL/L (ref 23–29)
COLOR UR AUTO: ABNORMAL
CREAT SERPL-MCNC: 0.6 MG/DL (ref 0.5–1.4)
CREAT UR-MCNC: 11 MG/DL (ref 15–325)
DIFFERENTIAL METHOD: ABNORMAL
EOSINOPHIL # BLD AUTO: 0 K/UL (ref 0–0.5)
EOSINOPHIL NFR BLD: 0.4 % (ref 0–8)
ERYTHROCYTE [DISTWIDTH] IN BLOOD BY AUTOMATED COUNT: 12.4 % (ref 11.5–14.5)
EST. GFR  (AFRICAN AMERICAN): >60 ML/MIN/1.73 M^2
EST. GFR  (NON AFRICAN AMERICAN): >60 ML/MIN/1.73 M^2
ETHANOL SERPL-MCNC: 101 MG/DL
GLUCOSE SERPL-MCNC: 102 MG/DL (ref 70–110)
GLUCOSE UR QL STRIP: NEGATIVE
HCT VFR BLD AUTO: 34.6 % (ref 37–48.5)
HGB BLD-MCNC: 12.3 G/DL (ref 12–16)
HGB UR QL STRIP: ABNORMAL
IMM GRANULOCYTES # BLD AUTO: 0.03 K/UL (ref 0–0.04)
IMM GRANULOCYTES NFR BLD AUTO: 0.4 % (ref 0–0.5)
KETONES UR QL STRIP: NEGATIVE
LEUKOCYTE ESTERASE UR QL STRIP: ABNORMAL
LYMPHOCYTES # BLD AUTO: 2.2 K/UL (ref 1–4.8)
LYMPHOCYTES NFR BLD: 26.2 % (ref 18–48)
MCH RBC QN AUTO: 34 PG (ref 27–31)
MCHC RBC AUTO-ENTMCNC: 35.5 G/DL (ref 32–36)
MCV RBC AUTO: 96 FL (ref 82–98)
METHADONE UR QL SCN>300 NG/ML: NEGATIVE
MICROSCOPIC COMMENT: ABNORMAL
MONOCYTES # BLD AUTO: 0.6 K/UL (ref 0.3–1)
MONOCYTES NFR BLD: 7.3 % (ref 4–15)
NEUTROPHILS # BLD AUTO: 5.5 K/UL (ref 1.8–7.7)
NEUTROPHILS NFR BLD: 65.1 % (ref 38–73)
NITRITE UR QL STRIP: NEGATIVE
NRBC BLD-RTO: 0 /100 WBC
OPIATES UR QL SCN: NEGATIVE
PCP UR QL SCN>25 NG/ML: NEGATIVE
PH UR STRIP: 7 [PH] (ref 5–8)
PLATELET # BLD AUTO: 318 K/UL (ref 150–450)
PMV BLD AUTO: 9.3 FL (ref 9.2–12.9)
POTASSIUM SERPL-SCNC: 3.5 MMOL/L (ref 3.5–5.1)
PROT SERPL-MCNC: 7.1 G/DL (ref 6–8.4)
PROT UR QL STRIP: NEGATIVE
RBC # BLD AUTO: 3.62 M/UL (ref 4–5.4)
RBC #/AREA URNS AUTO: 4 /HPF (ref 0–4)
SALICYLATES SERPL-MCNC: <5 MG/DL (ref 15–30)
SODIUM SERPL-SCNC: 129 MMOL/L (ref 136–145)
SP GR UR STRIP: 1 (ref 1–1.03)
SQUAMOUS #/AREA URNS AUTO: 3 /HPF
T4 FREE SERPL-MCNC: 1.64 NG/DL (ref 0.71–1.51)
TOXICOLOGY INFORMATION: ABNORMAL
TSH SERPL DL<=0.005 MIU/L-ACNC: 0.02 UIU/ML (ref 0.4–4)
URN SPEC COLLECT METH UR: ABNORMAL
WBC # BLD AUTO: 8.46 K/UL (ref 3.9–12.7)
WBC #/AREA URNS AUTO: 6 /HPF (ref 0–5)

## 2022-01-09 PROCEDURE — 80053 COMPREHEN METABOLIC PANEL: CPT | Performed by: EMERGENCY MEDICINE

## 2022-01-09 PROCEDURE — 86803 HEPATITIS C AB TEST: CPT | Performed by: EMERGENCY MEDICINE

## 2022-01-09 PROCEDURE — 84439 ASSAY OF FREE THYROXINE: CPT | Performed by: EMERGENCY MEDICINE

## 2022-01-09 PROCEDURE — 80179 DRUG ASSAY SALICYLATE: CPT | Performed by: EMERGENCY MEDICINE

## 2022-01-09 PROCEDURE — 51702 INSERT TEMP BLADDER CATH: CPT

## 2022-01-09 PROCEDURE — 81001 URINALYSIS AUTO W/SCOPE: CPT | Performed by: EMERGENCY MEDICINE

## 2022-01-09 PROCEDURE — 99285 EMERGENCY DEPT VISIT HI MDM: CPT | Mod: 25

## 2022-01-09 PROCEDURE — 96376 TX/PRO/DX INJ SAME DRUG ADON: CPT

## 2022-01-09 PROCEDURE — 25500020 PHARM REV CODE 255: Performed by: EMERGENCY MEDICINE

## 2022-01-09 PROCEDURE — 82077 ASSAY SPEC XCP UR&BREATH IA: CPT | Performed by: EMERGENCY MEDICINE

## 2022-01-09 PROCEDURE — 63600175 PHARM REV CODE 636 W HCPCS: Performed by: EMERGENCY MEDICINE

## 2022-01-09 PROCEDURE — 80307 DRUG TEST PRSMV CHEM ANLYZR: CPT | Performed by: EMERGENCY MEDICINE

## 2022-01-09 PROCEDURE — 99285 PR EMERGENCY DEPT VISIT,LEVEL V: ICD-10-PCS | Mod: CS,,, | Performed by: EMERGENCY MEDICINE

## 2022-01-09 PROCEDURE — 85025 COMPLETE CBC W/AUTO DIFF WBC: CPT | Performed by: EMERGENCY MEDICINE

## 2022-01-09 PROCEDURE — 96374 THER/PROPH/DIAG INJ IV PUSH: CPT

## 2022-01-09 PROCEDURE — 87389 HIV-1 AG W/HIV-1&-2 AB AG IA: CPT | Performed by: EMERGENCY MEDICINE

## 2022-01-09 PROCEDURE — 99285 EMERGENCY DEPT VISIT HI MDM: CPT | Mod: CS,,, | Performed by: EMERGENCY MEDICINE

## 2022-01-09 PROCEDURE — 80143 DRUG ASSAY ACETAMINOPHEN: CPT | Performed by: EMERGENCY MEDICINE

## 2022-01-09 PROCEDURE — 84443 ASSAY THYROID STIM HORMONE: CPT | Performed by: EMERGENCY MEDICINE

## 2022-01-09 RX ORDER — DIAZEPAM 10 MG/2ML
5 INJECTION INTRAMUSCULAR
Status: COMPLETED | OUTPATIENT
Start: 2022-01-09 | End: 2022-01-09

## 2022-01-09 RX ORDER — DIAZEPAM 5 MG/ML
5 INJECTION, SOLUTION INTRAMUSCULAR; INTRAVENOUS ONCE
Status: DISCONTINUED | OUTPATIENT
Start: 2022-01-10 | End: 2022-01-09

## 2022-01-09 RX ORDER — DIAZEPAM 10 MG/2ML
5 INJECTION INTRAMUSCULAR ONCE
Status: COMPLETED | OUTPATIENT
Start: 2022-01-09 | End: 2022-01-09

## 2022-01-09 RX ADMIN — DIAZEPAM 5 MG: 5 INJECTION, SOLUTION INTRAMUSCULAR; INTRAVENOUS at 11:01

## 2022-01-09 RX ADMIN — IOHEXOL 75 ML: 350 INJECTION, SOLUTION INTRAVENOUS at 11:01

## 2022-01-09 RX ADMIN — DIAZEPAM 5 MG: 5 INJECTION, SOLUTION INTRAMUSCULAR; INTRAVENOUS at 08:01

## 2022-01-10 PROBLEM — F10.20 ETOH DEPENDENCE: Status: ACTIVE | Noted: 2022-01-10

## 2022-01-10 PROBLEM — G93.41 ACUTE METABOLIC ENCEPHALOPATHY: Status: ACTIVE | Noted: 2022-01-10

## 2022-01-10 PROBLEM — E87.1 HYPONATREMIA: Status: ACTIVE | Noted: 2022-01-10

## 2022-01-10 PROBLEM — E03.9 HYPOTHYROIDISM: Status: ACTIVE | Noted: 2022-01-10

## 2022-01-10 LAB
ALBUMIN SERPL BCP-MCNC: 4.1 G/DL (ref 3.5–5.2)
ALP SERPL-CCNC: 48 U/L (ref 55–135)
ALT SERPL W/O P-5'-P-CCNC: 16 U/L (ref 10–44)
ANION GAP SERPL CALC-SCNC: 13 MMOL/L (ref 8–16)
AST SERPL-CCNC: 25 U/L (ref 10–40)
BASOPHILS # BLD AUTO: 0.04 K/UL (ref 0–0.2)
BASOPHILS NFR BLD: 0.3 % (ref 0–1.9)
BILIRUB SERPL-MCNC: 0.5 MG/DL (ref 0.1–1)
BUN SERPL-MCNC: 4 MG/DL (ref 8–23)
CALCIUM SERPL-MCNC: 9.6 MG/DL (ref 8.7–10.5)
CHLORIDE SERPL-SCNC: 105 MMOL/L (ref 95–110)
CK SERPL-CCNC: 87 U/L (ref 20–180)
CO2 SERPL-SCNC: 20 MMOL/L (ref 23–29)
CREAT SERPL-MCNC: 0.6 MG/DL (ref 0.5–1.4)
CREAT UR-MCNC: 12 MG/DL (ref 15–325)
CTP QC/QA: YES
DIFFERENTIAL METHOD: ABNORMAL
EOSINOPHIL # BLD AUTO: 0 K/UL (ref 0–0.5)
EOSINOPHIL NFR BLD: 0.1 % (ref 0–8)
ERYTHROCYTE [DISTWIDTH] IN BLOOD BY AUTOMATED COUNT: 12.4 % (ref 11.5–14.5)
EST. GFR  (AFRICAN AMERICAN): >60 ML/MIN/1.73 M^2
EST. GFR  (NON AFRICAN AMERICAN): >60 ML/MIN/1.73 M^2
GLUCOSE SERPL-MCNC: 104 MG/DL (ref 70–110)
HCT VFR BLD AUTO: 38.1 % (ref 37–48.5)
HCV AB SERPL QL IA: NEGATIVE
HGB BLD-MCNC: 13.3 G/DL (ref 12–16)
HIV 1+2 AB+HIV1 P24 AG SERPL QL IA: NEGATIVE
IMM GRANULOCYTES # BLD AUTO: 0.03 K/UL (ref 0–0.04)
IMM GRANULOCYTES NFR BLD AUTO: 0.2 % (ref 0–0.5)
LYMPHOCYTES # BLD AUTO: 2.6 K/UL (ref 1–4.8)
LYMPHOCYTES NFR BLD: 20.5 % (ref 18–48)
MAGNESIUM SERPL-MCNC: 1.8 MG/DL (ref 1.6–2.6)
MCH RBC QN AUTO: 33.3 PG (ref 27–31)
MCHC RBC AUTO-ENTMCNC: 34.9 G/DL (ref 32–36)
MCV RBC AUTO: 95 FL (ref 82–98)
MONOCYTES # BLD AUTO: 1.2 K/UL (ref 0.3–1)
MONOCYTES NFR BLD: 9.8 % (ref 4–15)
NEUTROPHILS # BLD AUTO: 8.6 K/UL (ref 1.8–7.7)
NEUTROPHILS NFR BLD: 69.1 % (ref 38–73)
NRBC BLD-RTO: 0 /100 WBC
OSMOLALITY SERPL: 287 MOSM/KG (ref 275–295)
OSMOLALITY UR: 141 MOSM/KG (ref 50–1200)
PHOSPHATE SERPL-MCNC: 3.6 MG/DL (ref 2.7–4.5)
PLATELET # BLD AUTO: 353 K/UL (ref 150–450)
PMV BLD AUTO: 9.7 FL (ref 9.2–12.9)
POTASSIUM SERPL-SCNC: 3.8 MMOL/L (ref 3.5–5.1)
PROT SERPL-MCNC: 7.6 G/DL (ref 6–8.4)
RBC # BLD AUTO: 4 M/UL (ref 4–5.4)
SARS-COV-2 RDRP RESP QL NAA+PROBE: NEGATIVE
SODIUM SERPL-SCNC: 138 MMOL/L (ref 136–145)
SODIUM UR-SCNC: 29 MMOL/L (ref 20–250)
WBC # BLD AUTO: 12.43 K/UL (ref 3.9–12.7)

## 2022-01-10 PROCEDURE — 83935 ASSAY OF URINE OSMOLALITY: CPT | Performed by: STUDENT IN AN ORGANIZED HEALTH CARE EDUCATION/TRAINING PROGRAM

## 2022-01-10 PROCEDURE — 97162 PT EVAL MOD COMPLEX 30 MIN: CPT

## 2022-01-10 PROCEDURE — 83930 ASSAY OF BLOOD OSMOLALITY: CPT | Performed by: STUDENT IN AN ORGANIZED HEALTH CARE EDUCATION/TRAINING PROGRAM

## 2022-01-10 PROCEDURE — 84100 ASSAY OF PHOSPHORUS: CPT | Performed by: STUDENT IN AN ORGANIZED HEALTH CARE EDUCATION/TRAINING PROGRAM

## 2022-01-10 PROCEDURE — 85025 COMPLETE CBC W/AUTO DIFF WBC: CPT | Performed by: STUDENT IN AN ORGANIZED HEALTH CARE EDUCATION/TRAINING PROGRAM

## 2022-01-10 PROCEDURE — 97165 OT EVAL LOW COMPLEX 30 MIN: CPT

## 2022-01-10 PROCEDURE — 82570 ASSAY OF URINE CREATININE: CPT | Performed by: STUDENT IN AN ORGANIZED HEALTH CARE EDUCATION/TRAINING PROGRAM

## 2022-01-10 PROCEDURE — 82550 ASSAY OF CK (CPK): CPT | Performed by: STUDENT IN AN ORGANIZED HEALTH CARE EDUCATION/TRAINING PROGRAM

## 2022-01-10 PROCEDURE — U0002 COVID-19 LAB TEST NON-CDC: HCPCS | Performed by: EMERGENCY MEDICINE

## 2022-01-10 PROCEDURE — 93010 ELECTROCARDIOGRAM REPORT: CPT | Mod: ,,, | Performed by: INTERNAL MEDICINE

## 2022-01-10 PROCEDURE — 93005 ELECTROCARDIOGRAM TRACING: CPT

## 2022-01-10 PROCEDURE — 97535 SELF CARE MNGMENT TRAINING: CPT

## 2022-01-10 PROCEDURE — 83735 ASSAY OF MAGNESIUM: CPT | Performed by: STUDENT IN AN ORGANIZED HEALTH CARE EDUCATION/TRAINING PROGRAM

## 2022-01-10 PROCEDURE — 25000003 PHARM REV CODE 250

## 2022-01-10 PROCEDURE — 84300 ASSAY OF URINE SODIUM: CPT | Performed by: STUDENT IN AN ORGANIZED HEALTH CARE EDUCATION/TRAINING PROGRAM

## 2022-01-10 PROCEDURE — 25000003 PHARM REV CODE 250: Performed by: STUDENT IN AN ORGANIZED HEALTH CARE EDUCATION/TRAINING PROGRAM

## 2022-01-10 PROCEDURE — 63600175 PHARM REV CODE 636 W HCPCS: Performed by: STUDENT IN AN ORGANIZED HEALTH CARE EDUCATION/TRAINING PROGRAM

## 2022-01-10 PROCEDURE — 99223 1ST HOSP IP/OBS HIGH 75: CPT | Mod: ,,, | Performed by: HOSPITALIST

## 2022-01-10 PROCEDURE — 20600001 HC STEP DOWN PRIVATE ROOM

## 2022-01-10 PROCEDURE — 93010 EKG 12-LEAD: ICD-10-PCS | Mod: ,,, | Performed by: INTERNAL MEDICINE

## 2022-01-10 PROCEDURE — 99223 PR INITIAL HOSPITAL CARE,LEVL III: ICD-10-PCS | Mod: ,,, | Performed by: HOSPITALIST

## 2022-01-10 PROCEDURE — 80053 COMPREHEN METABOLIC PANEL: CPT | Performed by: STUDENT IN AN ORGANIZED HEALTH CARE EDUCATION/TRAINING PROGRAM

## 2022-01-10 PROCEDURE — 63600175 PHARM REV CODE 636 W HCPCS

## 2022-01-10 RX ORDER — CEFTRIAXONE 1 G/1
1 INJECTION, POWDER, FOR SOLUTION INTRAMUSCULAR; INTRAVENOUS
Status: DISCONTINUED | OUTPATIENT
Start: 2022-01-10 | End: 2022-01-10

## 2022-01-10 RX ORDER — HYDRALAZINE HYDROCHLORIDE 25 MG/1
25 TABLET, FILM COATED ORAL EVERY 6 HOURS PRN
Status: DISCONTINUED | OUTPATIENT
Start: 2022-01-10 | End: 2022-01-11 | Stop reason: HOSPADM

## 2022-01-10 RX ORDER — HYDROCODONE BITARTRATE AND ACETAMINOPHEN 10; 325 MG/1; MG/1
1 TABLET ORAL EVERY 6 HOURS PRN
Status: DISCONTINUED | OUTPATIENT
Start: 2022-01-10 | End: 2022-01-11 | Stop reason: HOSPADM

## 2022-01-10 RX ORDER — SODIUM CHLORIDE 0.9 % (FLUSH) 0.9 %
10 SYRINGE (ML) INJECTION
Status: DISCONTINUED | OUTPATIENT
Start: 2022-01-10 | End: 2022-01-10

## 2022-01-10 RX ORDER — HYDROCODONE BITARTRATE AND ACETAMINOPHEN 10; 325 MG/1; MG/1
1 TABLET ORAL EVERY 6 HOURS PRN
Status: DISCONTINUED | OUTPATIENT
Start: 2022-01-10 | End: 2022-01-10

## 2022-01-10 RX ORDER — IBUPROFEN 200 MG
24 TABLET ORAL
Status: DISCONTINUED | OUTPATIENT
Start: 2022-01-10 | End: 2022-01-11 | Stop reason: HOSPADM

## 2022-01-10 RX ORDER — PRAVASTATIN SODIUM 40 MG/1
40 TABLET ORAL DAILY
Status: DISCONTINUED | OUTPATIENT
Start: 2022-01-10 | End: 2022-01-11 | Stop reason: HOSPADM

## 2022-01-10 RX ORDER — METHOCARBAMOL 500 MG/1
500 TABLET, FILM COATED ORAL 4 TIMES DAILY
Status: DISCONTINUED | OUTPATIENT
Start: 2022-01-10 | End: 2022-01-11 | Stop reason: HOSPADM

## 2022-01-10 RX ORDER — TALC
6 POWDER (GRAM) TOPICAL NIGHTLY PRN
Status: DISCONTINUED | OUTPATIENT
Start: 2022-01-10 | End: 2022-01-10

## 2022-01-10 RX ORDER — IBUPROFEN 200 MG
16 TABLET ORAL
Status: DISCONTINUED | OUTPATIENT
Start: 2022-01-10 | End: 2022-01-11 | Stop reason: HOSPADM

## 2022-01-10 RX ORDER — MORPHINE SULFATE 2 MG/ML
3 INJECTION, SOLUTION INTRAMUSCULAR; INTRAVENOUS ONCE
Status: COMPLETED | OUTPATIENT
Start: 2022-01-10 | End: 2022-01-10

## 2022-01-10 RX ORDER — HYDROXYZINE HYDROCHLORIDE 25 MG/1
25 TABLET, FILM COATED ORAL 3 TIMES DAILY PRN
Status: DISCONTINUED | OUTPATIENT
Start: 2022-01-10 | End: 2022-01-11 | Stop reason: HOSPADM

## 2022-01-10 RX ORDER — MORPHINE SULFATE 2 MG/ML
3 INJECTION, SOLUTION INTRAMUSCULAR; INTRAVENOUS EVERY 4 HOURS PRN
Status: DISCONTINUED | OUTPATIENT
Start: 2022-01-10 | End: 2022-01-11 | Stop reason: HOSPADM

## 2022-01-10 RX ORDER — MUPIROCIN 20 MG/G
OINTMENT TOPICAL 2 TIMES DAILY
Status: DISCONTINUED | OUTPATIENT
Start: 2022-01-10 | End: 2022-01-10

## 2022-01-10 RX ORDER — FOLIC ACID 1 MG/1
1 TABLET ORAL DAILY
Status: DISCONTINUED | OUTPATIENT
Start: 2022-01-10 | End: 2022-01-11 | Stop reason: HOSPADM

## 2022-01-10 RX ORDER — LORAZEPAM 1 MG/1
2 TABLET ORAL EVERY 4 HOURS PRN
Status: DISCONTINUED | OUTPATIENT
Start: 2022-01-10 | End: 2022-01-11 | Stop reason: HOSPADM

## 2022-01-10 RX ORDER — ACETAMINOPHEN 500 MG
1000 TABLET ORAL EVERY 8 HOURS
Status: DISCONTINUED | OUTPATIENT
Start: 2022-01-10 | End: 2022-01-10

## 2022-01-10 RX ORDER — LIDOCAINE 50 MG/G
1 PATCH TOPICAL
Status: DISCONTINUED | OUTPATIENT
Start: 2022-01-10 | End: 2022-01-11 | Stop reason: HOSPADM

## 2022-01-10 RX ORDER — GLUCAGON 1 MG
1 KIT INJECTION
Status: DISCONTINUED | OUTPATIENT
Start: 2022-01-10 | End: 2022-01-11 | Stop reason: HOSPADM

## 2022-01-10 RX ORDER — KETOROLAC TROMETHAMINE 30 MG/ML
15 INJECTION, SOLUTION INTRAMUSCULAR; INTRAVENOUS ONCE
Status: COMPLETED | OUTPATIENT
Start: 2022-01-10 | End: 2022-01-10

## 2022-01-10 RX ORDER — NALOXONE HCL 0.4 MG/ML
0.02 VIAL (ML) INJECTION
Status: DISCONTINUED | OUTPATIENT
Start: 2022-01-10 | End: 2022-01-11 | Stop reason: HOSPADM

## 2022-01-10 RX ADMIN — KETOROLAC TROMETHAMINE 15 MG: 30 INJECTION, SOLUTION INTRAMUSCULAR; INTRAVENOUS at 04:01

## 2022-01-10 RX ADMIN — ACETAMINOPHEN 1000 MG: 500 TABLET ORAL at 11:01

## 2022-01-10 RX ADMIN — THERA TABS 1 TABLET: TAB at 08:01

## 2022-01-10 RX ADMIN — METHOCARBAMOL 500 MG: 500 TABLET ORAL at 04:01

## 2022-01-10 RX ADMIN — HYDROCODONE BITARTRATE AND ACETAMINOPHEN 1 TABLET: 10; 325 TABLET ORAL at 02:01

## 2022-01-10 RX ADMIN — TRAZODONE HYDROCHLORIDE 25 MG: 50 TABLET ORAL at 11:01

## 2022-01-10 RX ADMIN — METHOCARBAMOL 500 MG: 500 TABLET ORAL at 08:01

## 2022-01-10 RX ADMIN — PRAVASTATIN SODIUM 40 MG: 40 TABLET ORAL at 08:01

## 2022-01-10 RX ADMIN — MORPHINE SULFATE 3 MG: 2 INJECTION, SOLUTION INTRAMUSCULAR; INTRAVENOUS at 11:01

## 2022-01-10 RX ADMIN — METHOCARBAMOL 500 MG: 500 TABLET ORAL at 02:01

## 2022-01-10 RX ADMIN — FOLIC ACID 1 MG: 1 TABLET ORAL at 08:01

## 2022-01-10 RX ADMIN — FOLIC ACID: 5 INJECTION, SOLUTION INTRAMUSCULAR; INTRAVENOUS; SUBCUTANEOUS at 09:01

## 2022-01-10 RX ADMIN — LORAZEPAM 2 MG: 1 TABLET ORAL at 08:01

## 2022-01-10 RX ADMIN — LIDOCAINE 5% 1 PATCH: 700 PATCH TOPICAL at 11:01

## 2022-01-10 RX ADMIN — LEVOTHYROXINE SODIUM 137 MCG: 0.14 TABLET ORAL at 05:01

## 2022-01-10 RX ADMIN — LORAZEPAM 2 MG: 1 TABLET ORAL at 03:01

## 2022-01-10 NOTE — ED NOTES
LOC: The patient is asleep but awakens to touch and voice, alert, and oriented to self, place, and time.. Pt is calm and cooperative. Affect is lethargic; Med team aware.  Speech is appropriate and clear.     APPEARANCE: Patient resting comfortably in no acute distress.  Patient is clean and well groomed.    SKIN: The skin is warm and dry; color consistent with ethnicity.  Patient has normal skin turgor and moist mucus membranes.  Skin intact; no breakdown or bruising noted.     MUSCULOSKELETAL: Patient moving upper and lower extremities without difficulty; denies pain in the extremities or back.  Generalized weakness.     RESPIRATORY: Airway is open and patent. Respirations spontaneous, even, easy, and non-labored.  Patient has a normal effort and rate.  No accessory muscle use noted. Denies cough and SOB.     CARDIAC:  Normal rhythm and rate noted.  No peripheral edema noted. No complaints of chest pain.     ABDOMEN: Soft and non tender to palpation.  No distention noted. Pt denies abdominal pain; denies nausea, vomiting, diarrhea, or constipation.    NEUROLOGIC: Eyes open spontaneously.  Behavior appropriate to situation.  Follows commands; facial expression symmetrical.  Purposeful motor response noted; normal sensation in all extremities. Pt denies headache; denies lightheadedness or dizziness; denies visual disturbances; denies loss of balance; denies unilateral weakness. Trembling noted. Reports tightness in hands.

## 2022-01-10 NOTE — H&P
[facility]  [facility]  History & Physical    Patient Name: Eden Kuhn  MRN: [id number]  Patient Class: IP- Inpatient  Admission Date: 1/9/2022  Attending Physician: Moraima Ritchie*   Primary Care Provider: Chino Figueroa MD         Patient information was obtained from spouse/SO, past medical records and ER records.     Subjective:     Principal Problem:<principal problem not specified>    Chief Complaint:   Chief Complaint   Patient presents with    Altered Mental Status     Found by family acting different this evening in bed; no weakness, moving all extremities, no slurred speech        HPI: Ms. Kuhn is a 61 F with with PMHx of thyroid disease, insomnia, anxiety, HLD who presents with altered mental status. Information is provided by patient's . Patient was watching football earlier today in her normal state of health, then around 6PM he found her curled up in a bedroom confused, not answering questions and appeared to be in pain.  denies previous episodes. Per , she is complaint with her medications but does not know the exact dosage of thyroid medication. Additionally endorses EtOH but could not commend on how much or how long but denies hx seizures or tremors. Had a similar response when asked about tobacco use. Denied any illicit drug use (marijuana, cocaine, heroin).     ED Course: hypertensive on arrival /103mmHg, otherwise stable. CT A/P and Head returned negative. UA consistent with bacteruria. UDS positive for THC. Labs significant for T4 1.64, TSH 0.017, Na 129. Given diazepam x 2 for agitation.           Past Medical History:   Diagnosis Date    Anxiety     Arthritis     Insomnia     Thyroid disease     hypothyroid       Past Surgical History:   Procedure Laterality Date    ANAL FISTULOTOMY  01/21/2002    bladder repair      CYSTOCELE REPAIR  06/06/2011    ESOPHAGOGASTRODUODENOSCOPY N/A 8/3/2018    Procedure: EGD  (ESOPHAGOGASTRODUODENOSCOPY);  Surgeon: Adam Olivera MD;  Location: [facility] ([address] [place] [address]);  Service: Endoscopy;  Laterality: N/A;  Patient is VIP patient.  Plan to do during the week of July 16-20.  May cancel as it gets closer if she improves, but want to have it on the books.    Patient requested this date due to insurance.    EYE SURGERY  02/23/2012    Bilateral upper lid blepharoplasty with levator fixation    HYSTERECTOMY         Review of patient's allergies indicates:   Allergen Reactions    Ciprocinonide Anaphylaxis       No current facility-administered medications on file prior to encounter.     Current Outpatient Medications on File Prior to Encounter   Medication Sig    albuterol (PROVENTIL/VENTOLIN HFA) 90 mcg/actuation inhaler INHALE 2 PUFFS PO EVERY 6 HOURS PRN    b complex vitamins capsule Take 1 capsule by mouth once daily.    carisoprodol (SOMA) 350 MG tablet     diphenoxylate-atropine 2.5-0.025 mg (LOMOTIL) 2.5-0.025 mg per tablet Take 1 tablet by mouth every 6 (six) hours as needed.    ergocalciferol (ERGOCALCIFEROL) 50,000 unit Cap TK 1 C PO TWICE WEEKLY    hydrocodone-acetaminophen 10-325mg (NORCO)  mg Tab Take 1 tablet by mouth every 6 (six) hours as needed.    levothyroxine (SYNTHROID) 125 MCG tablet TK 1 T PO QD    levothyroxine (SYNTHROID) 137 MCG Tab tablet 125 mcg.     levothyroxine (SYNTHROID, LEVOTHROID) 175 MCG tablet Take 175 mcg by mouth once daily.      liothyronine (CYTOMEL) 5 MCG Tab TK 1 T PO QAM    pravastatin (PRAVACHOL) 40 MG tablet Take 40 mg by mouth once daily.    sulfamethoxazole-trimethoprim 800-160mg (BACTRIM DS) 800-160 mg Tab TK 1 T PO  BID    traZODone (DESYREL) 50 MG tablet Take 50 mg by mouth as needed for Insomnia.    valACYclovir (VALTREX) 500 MG tablet TK 1 T PO QD     Family History     Problem Relation (Age of Onset)    Diabetes Mother    Thyroid disease Mother        Tobacco Use    Smoking status: Current Every Day Smoker      Packs/day: 0.50     Years: 13.00     Pack years: 6.50    Smokeless tobacco: Never Used   Substance and Sexual Activity    Alcohol use: Yes     Alcohol/week: 2.0 - 3.0 standard drinks     Types: 2 - 3 Cans of beer per week    Drug use: No    Sexual activity: Yes     Partners: Male     Review of Systems   Unable to perform ROS: Acuity of condition   Constitutional: Positive for unexpected weight change.     Objective:     Vital Signs (Most Recent):  Temp: 98.2 °F (36.8 °C) (01/09/22 2010)  Pulse: 75 (01/10/22 0300)  Resp: 15 (01/10/22 0300)  BP: (!) 148/89 (01/10/22 0300)  SpO2: 98 % (01/10/22 0300) Vital Signs (24h Range):  Temp:  [98.2 °F (36.8 °C)] 98.2 °F (36.8 °C)  Pulse:  [71-95] 75  Resp:  [13-24] 15  SpO2:  [96 %-100 %] 98 %  BP: (111-191)/() 148/89     Weight: 49.9 kg (110 lb)  Body mass index is 18.3 kg/m².    Physical Exam  Vitals and nursing note reviewed.   Constitutional:       General: She is sleeping. She is not in acute distress.     Appearance: She is underweight. She is not ill-appearing.   HENT:      Head: Normocephalic and atraumatic.      Nose: Nose normal.   Eyes:      General: No scleral icterus.     Conjunctiva/sclera: Conjunctivae normal.   Cardiovascular:      Rate and Rhythm: Normal rate and regular rhythm.      Pulses: Normal pulses.      Heart sounds: Normal heart sounds. No murmur heard.      Pulmonary:      Effort: Pulmonary effort is normal. No respiratory distress.      Breath sounds: Normal breath sounds.   Abdominal:      General: Bowel sounds are normal. There is no distension.      Palpations: Abdomen is soft.   Skin:     General: Skin is warm and dry.      Capillary Refill: Capillary refill takes less than 2 seconds.   Neurological:      General: No focal deficit present.      Mental Status: She is unresponsive.   Psychiatric:         Behavior: Behavior is uncooperative.             Significant Labs:   All pertinent labs within the past 24 hours have been  reviewed.  CBC:   Recent Labs   Lab 01/09/22 2040   WBC 8.46   HGB 12.3   HCT 34.6*        CMP:   Recent Labs   Lab 01/09/22 2040   *   K 3.5   CL 97   CO2 21*      BUN 4*   CREATININE 0.6   CALCIUM 8.9   PROT 7.1   ALBUMIN 4.0   BILITOT 0.5   ALKPHOS 46*   AST 25   ALT 16   ANIONGAP 11   EGFRNONAA >60.0     Lactic Acid: No results for input(s): LACTATE in the last 48 hours.  Troponin: No results for input(s): TROPONINI in the last 48 hours.  TSH:   Recent Labs   Lab 01/09/22 2040   TSH 0.017*     Urine Studies:   Recent Labs   Lab 01/09/22 2141   COLORU Straw   APPEARANCEUA Hazy*   PHUR 7.0   SPECGRAV 1.000*   PROTEINUA Negative   GLUCUA Negative   KETONESU Negative   BILIRUBINUA Negative   OCCULTUA 2+*   NITRITE Negative   LEUKOCYTESUR 2+*   RBCUA 4   WBCUA 6*   BACTERIA Moderate*   SQUAMEPITHEL 3       Significant Imaging: I have reviewed all pertinent imaging results/findings within the past 24 hours.     Imaging Results          CT Abdomen Pelvis With Contrast (Final result)  Result time 01/10/22 00:01:38    Final result by Spencer Tran MD (01/10/22 00:01:38)                 Impression:      No acute process identified in the abdomen and pelvis, allowing for motion and artifact limitations.    Hysterectomy.    Mild atherosclerosis involving the abdominal aorta.      Electronically signed by: Spencer Tran MD  Date:    01/10/2022  Time:    00:01             Narrative:    EXAMINATION:  CT ABDOMEN PELVIS WITH CONTRAST    CLINICAL HISTORY:  Abdominal abscess/infection suspected;    TECHNIQUE:  Low dose axial images, sagittal and coronal reformations were obtained from the lung bases to the pubic symphysis following the IV administration of 75 mL of Omnipaque 350 .  Oral contrast was not given.    COMPARISON:  CT abdomen pelvis, 07/12/2018.    FINDINGS:  Lower Chest:    Mild dependent subsegmental atelectasis in the lung bases.  No focal consolidation or pleural effusion.  Heart size  is normal.  No pericardial effusion.    Abdomen:    Evaluation is limited by extensive streak artifact due to the patient's arms overlying the field of view.  Exam quality is also limited by motion.    Liver is normal in size and contour.  No focal abnormality identified allowing for artifact limitations.  Gallbladder is unremarkable.  No calcified gallstones.  No intrahepatic biliary ductal dilatation.    Spleen is not enlarged.  Adrenal glands and pancreas are unremarkable.    The kidneys are symmetric.  No hydronephrosis. No asymmetric perinephric inflammatory changes.    No small bowel obstruction.  Evaluation for bowel inflammation somewhat limited by motion and artifact related to the patient's arms overlying the field of view.  The colon appears predominantly decompressed.  Appendix is not well delineated.  There are no convincing secondary findings of acute appendicitis.    No pneumoperitoneum or organized fluid collection.    No bulky lymphadenopathy.    Abdominal aorta is normal in caliber with mild calcific atherosclerosis.    Portal, splenic, and superior mesenteric veins are patent.    Pelvis:    Urinary bladder is distended.  No focal wall thickening.  Uterus is small or surgically absent.  Rectum is unremarkable.  No significant pelvic free fluid.  No bulky pelvic lymphadenopathy.    Bones and soft tissues:    No aggressive osseous lesions.  Extraperitoneal soft tissues are negative for acute finding.                               CT Head Without Contrast (Final result)  Result time 01/09/22 21:06:29    Final result by Spencer Tran MD (01/09/22 21:06:29)                 Impression:      No CT evidence of acute intracranial abnormality, allowing for motion limitations.    Diffuse pansinusitis.    Trace right mastoid effusion.      Electronically signed by: Spencer Tran MD  Date:    01/09/2022  Time:    21:06             Narrative:    EXAMINATION:  CT HEAD WITHOUT CONTRAST    CLINICAL  HISTORY:  Delirium;    TECHNIQUE:  Low dose axial images were obtained through the head.  Coronal and sagittal reformations were also performed. Contrast was not administered.    COMPARISON:  None.    FINDINGS:  Exam quality is limited by motion.    No evidence of acute territorial infarct, hemorrhage, mass effect, or midline shift.    Ventricles are normal in size and configuration.    No displaced calvarial fracture.    Patchy diffuse mucosal thickening involving the frontal, ethmoid, sphenoid, and maxillary sinuses.  Trace fluid in the right mastoid air cells.                                  Assessment/Plan:     * Acute metabolic encephalopathy  61 F admitted for altered mental status with unclear history of EtOH use with EtoH level elevated, UDS positive for THC with no prior history of similar presentation. High suspicion for drug induced panic attack/paroid episode. CT head, A/P with negative for any acute findings findings. Low oncern for infection given acute onset without history of seizures, afebrile on presentation along with labs without leukocytosis although UA showed bacteruria. Free T4 mildly elevated in patient with acute mental status change may also be a factor.  mentions recent changes to dosage per PCP.     Free T4 1.64; TSH 0.017  Salicylate level undetectable   EtOH level 101  Differentials include the following:  Metabolic: electrolytes, TSH  Neuro: EtOH intox or withdrawal  CT head, A/P negative for acute process   UA with bacteruria     PLAN  - Neuro checks q4hr  - Bladder scan  - Trend CMP  - VS q4h  - Monitor for acute changes in mentation         Hyponatremia  Na 129 on admission with acute onset confusion in setting of drug and alcohol use. No history of seizures: no. Euvolemic on exam.     DDx: Drug induced vs. hypovolemia vs. subclinical hypothyroidism vs. SIADH    PLAN:  -Monitor for acute changes in mental status   -Goal increase in serum Na of 6-8 mEq in 24 hours   -Fluid  restriction 1200cc daily   -Discontinue offending medications   -Serum Osm, urine Osm, urine Cr, urine Na pending       Hypothyroidism  Patient's  did not bring in prescription to verify.     Continue synthroid 137mcg      EtOH dependence  Drinks beers, undetermined time   Last drink today   H/o withdrawal or withdrawal seizures: patient unable to verify but per  no    Plan:  - Ativan 2mg IV PRN   - CIWA q4hr  - Seizure precautions  - Thiamine, folate, multivitamin        Upper abdominal pain  Please see acute metabolic encephalopathy       VTE Risk Mitigation (From admission, onward)         Ordered     Place sequential compression device  Until discontinued         01/10/22 0115     IP VTE LOW RISK PATIENT  Once         01/10/22 0115                   Kevin Jackson MD  Department of Hospital Medicine   [facility]

## 2022-01-10 NOTE — PT/OT/SLP EVAL
Occupational Therapy   Co-Evaluation/Treatment    Name: Eden Kuhn  MRN: [id number]  Admitting Diagnosis:  Acute metabolic encephalopathy  Recent Surgery: * No surgery found *      Recommendations:     Discharge Recommendations: rehabilitation facility  Discharge Equipment Recommendations:   (TBD)  Barriers to discharge:   (Increased level of assistance at this time.)    Assessment:     Eden Kuhn is a 61 y.o. female with a medical diagnosis of Acute metabolic encephalopathy. Performance deficits affecting function: weakness,impaired endurance,impaired self care skills,impaired functional mobilty,impaired balance,gait instability,decreased upper extremity function,decreased lower extremity function,pain. Initially attempted to see patient early this am, but patient would sleeping and could not wake her up with spouse present, returned later in am and patient agreed to OT evaluation despite being in 10/10 back pain. Patient would benefit from continued skilled acute OT 4x/wk to improve functional mobility, increase independence with ADLs, and address established goals. Recommending inpatient rehabilitation once medically appropriate for discharge to increase maximal independence, reduce burden of care, and ensure safety.     Rehab Prognosis: Good; patient would benefit from acute skilled OT services to address these deficits and reach maximum level of function.       Plan:     Patient to be seen 4 x/week to address the above listed problems via self-care/home management,therapeutic activities,therapeutic exercises  · Plan of Care Expires: 02/10/22  · Plan of Care Reviewed with: patient,spouse    Subjective     Chief Complaint: pain  Patient/Family Comments/goals: to get better    Occupational Profile:  Living Environment: Patient lives w/ her  in a 2SH w/ 3 steps to enter w/o handrails. Her bed/bathroom are on the 2nd floor. There is a left handrail for the stairs inside her home. Pt has a  tub/shower combo no grab bars or bench. Patient has a regular toilet.   Prior to admission, patients level of function was Independent w/ mobility/ADLs without an AD. She drives.  Equipment used at home: none.  DME owned (not currently used): none.  Upon discharge, patient will have assistance from her  but he works.    Pain/Comfort:  · Pain Rating 1: 10/10  · Location - Side 1: Bilateral  · Location - Orientation 1: generalized  · Location 1: back  · Pain Addressed 1: Reposition,Distraction,Cessation of Activity,Pre-medicate for activity  · Pain Rating Post-Intervention 1: 10/10    Patients cultural, spiritual, Sabianist conflicts given the current situation: no    Objective:     Communicated with: NSWILLIAM prior to session.  Patient found HOB elevated with telemetry,pulse ox (continuous),blood pressure cuff,jose catheter upon OT entry to room.    General Precautions: Standard, fall   Orthopedic Precautions:N/A   Braces: N/A  Respiratory Status: Room air    Occupational Performance:    Bed Mobility:    · Patient completed Supine to Sit with moderate assistance  · Patient completed Sit to Supine with maximal assistance    Functional Mobility/Transfers:  · Patient completed Sit <> Stand Transfer with minimum assistance and of 2 persons  with  hand-held assist   · Functional Mobility: Patient sidestepped to HOB with min A of 2 persons HHA    Activities of Daily Living:  · Lower Body Dressing: total assistance Donning socks  · Toileting: total assistance Patient was on bedpan and nurse cleaned patient up as therapist came in second time.      Grooming: This therapist attempted to wipe patient's face with washcloth during first attempt of OT evaluation for stimulation, but patient was too lethargic and therapist stopped.     Cognitive/Visual Perceptual:  Cognitive/Psychosocial Skills:     -       Oriented to: Person, Place, Time and Situation   -       Follows Commands/attention:Follows multistep  commands  -        Communication: clear/fluent  -       Memory: No Deficits noted  -       Safety awareness/insight to disability: intact   -       Mood/Affect/Coping skills/emotional control: Appropriate to situation  Visual/Perceptual:      -Intact      Physical Exam:  Upper Extremity Range of Motion:     -       Right Upper Extremity: WFL  -       Left Upper Extremity: WFL  Upper Extremity Strength:    -       Right Upper Extremity: 3/5  -       Left Upper Extremity: 3/5   Strength:    -       Right Upper Extremity: WFL  -       Left Upper Extremity: WFL  Fine Motor Coordination:    -       Intact  Gross motor coordination:   WFL    AMPAC 6 Click ADL:  AMPAC Total Score: 13    Treatment & Education:  Role of OT and POC  ADL retraining  Functional mobility training  Safety  Discharge planning    Co-treatment performed due to patient's multiple deficits requiring two skilled therapists to appropriately and safely assess patient's strength and endurance while facilitating functional tasks in addition to accommodating for patient's activity tolerance.     Education:  Patient left HOB elevated with call button in reach and all needs met.     GOALS:   Multidisciplinary Problems     Occupational Therapy Goals        Problem: Occupational Therapy Goal    Goal Priority Disciplines Outcome Interventions   Occupational Therapy Goal     OT, PT/OT Ongoing, Progressing    Description: Goals to be met by: 2/4/2022    Patient will increase functional independence with ADLs by performing:    UE Dressing with Set-up Assistance.  LE Dressing with Supervision.  Grooming while standing at sink with Supervision.  Toileting from toilet with Supervision for hygiene and clothing management.   Supine to sit with Supervision.  Stand pivot transfers with Supervision.  Toilet transfer to toilet with Supervision.                     History:     Past Medical History:   Diagnosis Date    Anxiety     Arthritis     Insomnia     Thyroid disease      hypothyroid       Past Surgical History:   Procedure Laterality Date    ANAL FISTULOTOMY  01/21/2002    bladder repair      CYSTOCELE REPAIR  06/06/2011    ESOPHAGOGASTRODUODENOSCOPY N/A 8/3/2018    Procedure: EGD (ESOPHAGOGASTRODUODENOSCOPY);  Surgeon: Adam Olivera MD;  Location: [facility] ([address] [place] [address]);  Service: Endoscopy;  Laterality: N/A;  Patient is VIP patient.  Plan to do during the week of July 16-20.  May cancel as it gets closer if she improves, but want to have it on the books.    Patient requested this date due to insurance.    EYE SURGERY  02/23/2012    Bilateral upper lid blepharoplasty with levator fixation    HYSTERECTOMY         Time Tracking:     OT Date of Treatment: 01/10/22  OT Start Time: 0915; 1127  OT Stop Time: 0925; 1133  OT Total Time (min): 16 min    Billable Minutes:Evaluation 8  Self Care/Home Management 8    1/10/2022
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
## 2022-01-10 NOTE — ED NOTES
PT/OT at bedside. Patient resting in stretcher with NAD noted. VSS, following commands, and speech clear and appropriate. All belongings within reach. Patient denies any pain at this time. Patient updated on plan of care and all questions addressed. Safety precautions remain in place.

## 2022-01-10 NOTE — ED PROVIDER NOTES
Source of History:  pt    Chief complaint:  Altered Mental Status (Found by family acting different this evening in bed; no weakness, moving all extremities, no slurred speech)      HPI:  Eden Kuhn is a 61 y.o. female presenting with altered mental status.  Patient was watching football earlier today with her  and in her normal state of health.  He then found her curled up in a bedroom confused, not answering questions and appeared to be in pain.  He called 911, and they noted that she was restless, not following commands, and trying to get out of the stretcher.  She kept saying she had to pee, and kept swearing, but would not answer any other questions.  They did not give her any medications.  They noted stable vitals.  The patient is unable to give me further information.  Her  states that she has not had any new medications, and that today was normal up until the point when she became altered.    ROS: As per HPI and below:    Unable to obtain due to altered mental status      Review of patient's allergies indicates:   Allergen Reactions    Ciprocinonide Anaphylaxis       No current facility-administered medications on file prior to encounter.     Current Outpatient Medications on File Prior to Encounter   Medication Sig Dispense Refill    albuterol (PROVENTIL/VENTOLIN HFA) 90 mcg/actuation inhaler INHALE 2 PUFFS PO EVERY 6 HOURS PRN  6    b complex vitamins capsule Take 1 capsule by mouth once daily.      carisoprodol (SOMA) 350 MG tablet   0    diphenoxylate-atropine 2.5-0.025 mg (LOMOTIL) 2.5-0.025 mg per tablet Take 1 tablet by mouth every 6 (six) hours as needed.  0    ergocalciferol (ERGOCALCIFEROL) 50,000 unit Cap TK 1 C PO TWICE WEEKLY  5    hydrocodone-acetaminophen 10-325mg (NORCO)  mg Tab Take 1 tablet by mouth every 6 (six) hours as needed.  0    levothyroxine (SYNTHROID) 125 MCG tablet TK 1 T PO QD  1    levothyroxine (SYNTHROID) 137 MCG Tab tablet 125 mcg.   0     levothyroxine (SYNTHROID, LEVOTHROID) 175 MCG tablet Take 175 mcg by mouth once daily.        liothyronine (CYTOMEL) 5 MCG Tab TK 1 T PO QAM  1    pravastatin (PRAVACHOL) 40 MG tablet Take 40 mg by mouth once daily.      sulfamethoxazole-trimethoprim 800-160mg (BACTRIM DS) 800-160 mg Tab TK 1 T PO  BID  1    traZODone (DESYREL) 50 MG tablet Take 50 mg by mouth as needed for Insomnia.      valACYclovir (VALTREX) 500 MG tablet TK 1 T PO QD  9       PMH:  As per HPI and below:  Past Medical History:   Diagnosis Date    Anxiety     Arthritis     Insomnia     Thyroid disease     hypothyroid     Past Surgical History:   Procedure Laterality Date    ANAL FISTULOTOMY  01/21/2002    bladder repair      CYSTOCELE REPAIR  06/06/2011    ESOPHAGOGASTRODUODENOSCOPY N/A 8/3/2018    Procedure: EGD (ESOPHAGOGASTRODUODENOSCOPY);  Surgeon: Adam Olivera MD;  Location: [address]);  Service: Endoscopy;  Laterality: N/A;  Patient is VIP patient.  Plan to do during the week of July 16-20.  May cancel as it gets closer if she improves, but want to have it on the books.    Patient requested this date due to insurance.    EYE SURGERY  02/23/2012    Bilateral upper lid blepharoplasty with levator fixation    HYSTERECTOMY         Social History     Socioeconomic History    Marital status:    Tobacco Use    Smoking status: Current Every Day Smoker     Packs/day: 0.50     Years: 13.00     Pack years: 6.50    Smokeless tobacco: Never Used   Substance and Sexual Activity    Alcohol use: Yes     Alcohol/week: 2.0 - 3.0 standard drinks     Types: 2 - 3 Cans of beer per week    Drug use: No    Sexual activity: Yes     Partners: Male       Family History   Problem Relation Age of Onset    Diabetes Mother     Thyroid disease Mother     Breast cancer Neg Hx     Ovarian cancer Neg Hx     Amblyopia Neg Hx     Blindness Neg Hx     Cancer Neg Hx     Cataracts Neg Hx     Glaucoma Neg Hx     Hypertension  "Neg Hx     Macular degeneration Neg Hx     Retinal detachment Neg Hx     Strabismus Neg Hx     Stroke Neg Hx        Physical Exam:    Vitals:    01/09/22 2200   BP: 121/76   Pulse: 76   Resp: 15   Temp:      Appearance:  Patient is sitting upright unable to sit still, continuing to say "let me go" and "I have to pee".  Skin: No rashes seen.  Good turgor.  No abrasions.  No ecchymoses.  Eyes: No conjunctival injection. EOMI, PERRL.  ENT: Oropharynx clear.    Chest:  No increased work of breathing, bilateral chest rise.  Cardiovascular: Regular rate and rhythm.  Normal equal bilateral radial pulses.  Abdomen: Soft.  Not distended.  Nontender.  No guarding.  No rebound. No Masses  Musculoskeletal: Good range of motion all joints.  No deformities.  Neck supple, full range of motion, no obvious deformity.  Neurologic: Moves all extremities.  Normal sensation.  No facial droop.  Normal speech.    Mental Status:  Agitated and delirious.  Will not hold still, will not follow commands.          Laboratory Studies:  Labs Reviewed   CBC W/ AUTO DIFFERENTIAL - Abnormal; Notable for the following components:       Result Value    RBC 3.62 (*)     Hematocrit 34.6 (*)     MCH 34.0 (*)     All other components within normal limits   COMPREHENSIVE METABOLIC PANEL - Abnormal; Notable for the following components:    Sodium 129 (*)     CO2 21 (*)     BUN 4 (*)     Alkaline Phosphatase 46 (*)     All other components within normal limits   DRUG SCREEN PANEL, URINE EMERGENCY - Abnormal; Notable for the following components:    THC Presumptive Positive (*)     Creatinine, Urine 11.0 (*)     All other components within normal limits    Narrative:     Specimen Source->Urine   URINALYSIS, REFLEX TO URINE CULTURE - Abnormal; Notable for the following components:    Appearance, UA Hazy (*)     Specific [place], UA 1.000 (*)     Occult Blood UA 2+ (*)     Leukocytes, UA 2+ (*)     All other components within normal limits    Narrative:     " Specimen Source->Urine   ALCOHOL,MEDICAL (ETHANOL) - Abnormal; Notable for the following components:    Alcohol, Serum 101 (*)     All other components within normal limits   SALICYLATE LEVEL - Abnormal; Notable for the following components:    Salicylate Lvl <5.0 (*)     All other components within normal limits   ACETAMINOPHEN LEVEL - Abnormal; Notable for the following components:    Acetaminophen (Tylenol), Serum <3.0 (*)     All other components within normal limits   URINALYSIS MICROSCOPIC - Abnormal; Notable for the following components:    WBC, UA 6 (*)     Bacteria Moderate (*)     All other components within normal limits    Narrative:     Specimen Source->Urine   TSH   HIV 1 / 2 ANTIBODY   HEPATITIS C ANTIBODY       I decided to obtain the old medical records.  Reviewed and summarized the old medical record and it showed patient takes trazodone at home, previous prescription for hydrocodone as well.  Imaging Results          CT Head Without Contrast (Final result)  Result time 01/09/22 21:06:29    Final result by Spencer Tran MD (01/09/22 21:06:29)                 Impression:      No CT evidence of acute intracranial abnormality, allowing for motion limitations.    Diffuse pansinusitis.    Trace right mastoid effusion.      Electronically signed by: Spencer Tran MD  Date:    01/09/2022  Time:    21:06             Narrative:    EXAMINATION:  CT HEAD WITHOUT CONTRAST    CLINICAL HISTORY:  Delirium;    TECHNIQUE:  Low dose axial images were obtained through the head.  Coronal and sagittal reformations were also performed. Contrast was not administered.    COMPARISON:  None.    FINDINGS:  Exam quality is limited by motion.    No evidence of acute territorial infarct, hemorrhage, mass effect, or midline shift.    Ventricles are normal in size and configuration.    No displaced calvarial fracture.    Patchy diffuse mucosal thickening involving the frontal, ethmoid, sphenoid, and maxillary sinuses.   Trace fluid in the right mastoid air cells.                                Medications Given:  Medications   diazePAM injection 5 mg (5 mg Intravenous Given 1/9/22 2032)       Discussed with:  Patient and family    MDM:    61 y.o. female with altered mental status of unknown origin.  Initial concern for intracranial hemorrhage, less likely stroke given her lack of focal deficits.  Also consider alcohol or drug intoxication or delirium.  Initially we were unable to examine or draw blood from patient and she was given 5 mg diazepam for anxiolysis.  Ethanol slightly elevated, CT head is negative for acute abnormality.  No significant electrolyte or CBC abnormalities, Tylenol and salicylate levels are undetectable.  TSH is pending.  Bedside ultrasound reveals normal diameter proximal and distal aorta, no obvious free fluid.  On re-evaluation she initially appeared more sedated, but became agitated with stimulus.  She again tried to sit up, in urinated on herself for the 2nd time.  She does appear uncomfortable, and CT of her abdomen/pelvis was ordered and currently pending.  Pending these results, the patient will likely need admission for altered mental status of unknown origin.  Final disposition signed out to overnight attending.    Diagnostic Impression:    1. Altered mental status, unspecified altered mental status type    2. AMS (altered mental status)             Michel Alba MD  01/09/22 3194

## 2022-01-10 NOTE — SUBJECTIVE & OBJECTIVE
Past Medical History:   Diagnosis Date    Anxiety     Arthritis     Insomnia     Thyroid disease     hypothyroid       Past Surgical History:   Procedure Laterality Date    ANAL FISTULOTOMY  01/21/2002    bladder repair      CYSTOCELE REPAIR  06/06/2011    ESOPHAGOGASTRODUODENOSCOPY N/A 8/3/2018    Procedure: EGD (ESOPHAGOGASTRODUODENOSCOPY);  Surgeon: Adam Olivera MD;  Location: [address]);  Service: Endoscopy;  Laterality: N/A;  Patient is VIP patient.  Plan to do during the week of July 16-20.  May cancel as it gets closer if she improves, but want to have it on the books.    Patient requested this date due to insurance.    EYE SURGERY  02/23/2012    Bilateral upper lid blepharoplasty with levator fixation    HYSTERECTOMY         Review of patient's allergies indicates:   Allergen Reactions    Ciprocinonide Anaphylaxis       No current facility-administered medications on file prior to encounter.     Current Outpatient Medications on File Prior to Encounter   Medication Sig    albuterol (PROVENTIL/VENTOLIN HFA) 90 mcg/actuation inhaler INHALE 2 PUFFS PO EVERY 6 HOURS PRN    b complex vitamins capsule Take 1 capsule by mouth once daily.    carisoprodol (SOMA) 350 MG tablet     diphenoxylate-atropine 2.5-0.025 mg (LOMOTIL) 2.5-0.025 mg per tablet Take 1 tablet by mouth every 6 (six) hours as needed.    ergocalciferol (ERGOCALCIFEROL) 50,000 unit Cap TK 1 C PO TWICE WEEKLY    hydrocodone-acetaminophen 10-325mg (NORCO)  mg Tab Take 1 tablet by mouth every 6 (six) hours as needed.    levothyroxine (SYNTHROID) 125 MCG tablet TK 1 T PO QD    levothyroxine (SYNTHROID) 137 MCG Tab tablet 125 mcg.     levothyroxine (SYNTHROID, LEVOTHROID) 175 MCG tablet Take 175 mcg by mouth once daily.      liothyronine (CYTOMEL) 5 MCG Tab TK 1 T PO QAM    pravastatin (PRAVACHOL) 40 MG tablet Take 40 mg by mouth once daily.    sulfamethoxazole-trimethoprim 800-160mg (BACTRIM DS) 800-160 mg Tab TK  1 T PO  BID    traZODone (DESYREL) 50 MG tablet Take 50 mg by mouth as needed for Insomnia.    valACYclovir (VALTREX) 500 MG tablet TK 1 T PO QD     Family History     Problem Relation (Age of Onset)    Diabetes Mother    Thyroid disease Mother        Tobacco Use    Smoking status: Current Every Day Smoker     Packs/day: 0.50     Years: 13.00     Pack years: 6.50    Smokeless tobacco: Never Used   Substance and Sexual Activity    Alcohol use: Yes     Alcohol/week: 2.0 - 3.0 standard drinks     Types: 2 - 3 Cans of beer per week    Drug use: No    Sexual activity: Yes     Partners: Male     Review of Systems   Unable to perform ROS: Acuity of condition   Constitutional: Positive for unexpected weight change.     Objective:     Vital Signs (Most Recent):  Temp: 98.2 °F (36.8 °C) (01/09/22 2010)  Pulse: 75 (01/10/22 0300)  Resp: 15 (01/10/22 0300)  BP: (!) 148/89 (01/10/22 0300)  SpO2: 98 % (01/10/22 0300) Vital Signs (24h Range):  Temp:  [98.2 °F (36.8 °C)] 98.2 °F (36.8 °C)  Pulse:  [71-95] 75  Resp:  [13-24] 15  SpO2:  [96 %-100 %] 98 %  BP: (111-191)/() 148/89     Weight: 49.9 kg (110 lb)  Body mass index is 18.3 kg/m².    Physical Exam  Vitals and nursing note reviewed.   Constitutional:       General: She is sleeping. She is not in acute distress.     Appearance: She is underweight. She is not ill-appearing.   HENT:      Head: Normocephalic and atraumatic.      Nose: Nose normal.   Eyes:      General: No scleral icterus.     Conjunctiva/sclera: Conjunctivae normal.   Cardiovascular:      Rate and Rhythm: Normal rate and regular rhythm.      Pulses: Normal pulses.      Heart sounds: Normal heart sounds. No murmur heard.      Pulmonary:      Effort: Pulmonary effort is normal. No respiratory distress.      Breath sounds: Normal breath sounds.   Abdominal:      General: Bowel sounds are normal. There is no distension.      Palpations: Abdomen is soft.   Skin:     General: Skin is warm and dry.       Capillary Refill: Capillary refill takes less than 2 seconds.   Neurological:      General: No focal deficit present.      Mental Status: She is unresponsive.   Psychiatric:         Behavior: Behavior is uncooperative.             Significant Labs:   All pertinent labs within the past 24 hours have been reviewed.  CBC:   Recent Labs   Lab 01/09/22 2040   WBC 8.46   HGB 12.3   HCT 34.6*        CMP:   Recent Labs   Lab 01/09/22 2040   *   K 3.5   CL 97   CO2 21*      BUN 4*   CREATININE 0.6   CALCIUM 8.9   PROT 7.1   ALBUMIN 4.0   BILITOT 0.5   ALKPHOS 46*   AST 25   ALT 16   ANIONGAP 11   EGFRNONAA >60.0     Lactic Acid: No results for input(s): LACTATE in the last 48 hours.  Troponin: No results for input(s): TROPONINI in the last 48 hours.  TSH:   Recent Labs   Lab 01/09/22 2040   TSH 0.017*     Urine Studies:   Recent Labs   Lab 01/09/22 2141   COLORU Straw   APPEARANCEUA Hazy*   PHUR 7.0   SPECGRAV 1.000*   PROTEINUA Negative   GLUCUA Negative   KETONESU Negative   BILIRUBINUA Negative   OCCULTUA 2+*   NITRITE Negative   LEUKOCYTESUR 2+*   RBCUA 4   WBCUA 6*   BACTERIA Moderate*   SQUAMEPITHEL 3       Significant Imaging: I have reviewed all pertinent imaging results/findings within the past 24 hours.     Imaging Results          CT Abdomen Pelvis With Contrast (Final result)  Result time 01/10/22 00:01:38    Final result by Spencer Tran MD (01/10/22 00:01:38)                 Impression:      No acute process identified in the abdomen and pelvis, allowing for motion and artifact limitations.    Hysterectomy.    Mild atherosclerosis involving the abdominal aorta.      Electronically signed by: Spencer Tran MD  Date:    01/10/2022  Time:    00:01             Narrative:    EXAMINATION:  CT ABDOMEN PELVIS WITH CONTRAST    CLINICAL HISTORY:  Abdominal abscess/infection suspected;    TECHNIQUE:  Low dose axial images, sagittal and coronal reformations were obtained from the lung bases  to the pubic symphysis following the IV administration of 75 mL of Omnipaque 350 .  Oral contrast was not given.    COMPARISON:  CT abdomen pelvis, 07/12/2018.    FINDINGS:  Lower Chest:    Mild dependent subsegmental atelectasis in the lung bases.  No focal consolidation or pleural effusion.  Heart size is normal.  No pericardial effusion.    Abdomen:    Evaluation is limited by extensive streak artifact due to the patient's arms overlying the field of view.  Exam quality is also limited by motion.    Liver is normal in size and contour.  No focal abnormality identified allowing for artifact limitations.  Gallbladder is unremarkable.  No calcified gallstones.  No intrahepatic biliary ductal dilatation.    Spleen is not enlarged.  Adrenal glands and pancreas are unremarkable.    The kidneys are symmetric.  No hydronephrosis. No asymmetric perinephric inflammatory changes.    No small bowel obstruction.  Evaluation for bowel inflammation somewhat limited by motion and artifact related to the patient's arms overlying the field of view.  The colon appears predominantly decompressed.  Appendix is not well delineated.  There are no convincing secondary findings of acute appendicitis.    No pneumoperitoneum or organized fluid collection.    No bulky lymphadenopathy.    Abdominal aorta is normal in caliber with mild calcific atherosclerosis.    Portal, splenic, and superior mesenteric veins are patent.    Pelvis:    Urinary bladder is distended.  No focal wall thickening.  Uterus is small or surgically absent.  Rectum is unremarkable.  No significant pelvic free fluid.  No bulky pelvic lymphadenopathy.    Bones and soft tissues:    No aggressive osseous lesions.  Extraperitoneal soft tissues are negative for acute finding.                               CT Head Without Contrast (Final result)  Result time 01/09/22 21:06:29    Final result by Spencer Tran MD (01/09/22 21:06:29)                 Impression:      No CT  evidence of acute intracranial abnormality, allowing for motion limitations.    Diffuse pansinusitis.    Trace right mastoid effusion.      Electronically signed by: Spencer Tran MD  Date:    01/09/2022  Time:    21:06             Narrative:    EXAMINATION:  CT HEAD WITHOUT CONTRAST    CLINICAL HISTORY:  Delirium;    TECHNIQUE:  Low dose axial images were obtained through the head.  Coronal and sagittal reformations were also performed. Contrast was not administered.    COMPARISON:  None.    FINDINGS:  Exam quality is limited by motion.    No evidence of acute territorial infarct, hemorrhage, mass effect, or midline shift.    Ventricles are normal in size and configuration.    No displaced calvarial fracture.    Patchy diffuse mucosal thickening involving the frontal, ethmoid, sphenoid, and maxillary sinuses.  Trace fluid in the right mastoid air cells.

## 2022-01-10 NOTE — ASSESSMENT & PLAN NOTE
Drinks beers, undetermined time   Last drink today   H/o withdrawal or withdrawal seizures: patient unable to verify but per  no    Plan:  - Ativan 2mg IV PRN   - CIWA q4hr  - Seizure precautions  - Thiamine, folate, multivitamin

## 2022-01-10 NOTE — PLAN OF CARE
Problem: Occupational Therapy Goal  Goal: Occupational Therapy Goal  Description: Goals to be met by: 2/4/2022    Patient will increase functional independence with ADLs by performing:    UE Dressing with Set-up Assistance.  LE Dressing with Supervision.  Grooming while standing at sink with Supervision.  Toileting from toilet with Supervision for hygiene and clothing management.   Supine to sit with Supervision.  Stand pivot transfers with Supervision.  Toilet transfer to toilet with Supervision.    Outcome: Ongoing, Progressing   Patient's goals are set.

## 2022-01-10 NOTE — ASSESSMENT & PLAN NOTE
61 F admitted for altered mental status with unclear history of EtOH use with EtoH level elevated, UDS positive for THC with no prior history of similar presentation. High suspicion for drug induced panic attack/paroid episode. CT head, A/P with negative for any acute findings findings. Low oncern for infection given acute onset without history of seizures, afebrile on presentation along with labs without leukocytosis although UA showed bacteruria. Free T4 mildly elevated in patient with acute mental status change may also be a factor.  mentions recent changes to dosage per PCP.     Free T4 1.64; TSH 0.017  Salicylate level undetectable   EtOH level 101  Differentials include the following:  Metabolic: electrolytes, TSH  Neuro: EtOH intox or withdrawal  CT head, A/P negative for acute process   UA with bacteruria     PLAN  - Neuro checks q4hr  - Bladder scan  - Trend CMP  - VS q4h  - Monitor for acute changes in mentation

## 2022-01-10 NOTE — PROVIDER PROGRESS NOTES - EMERGENCY DEPT.
Encounter Date: 1/9/2022    ED Physician Progress Notes        Physician Note:   Assumed care of patient at 2200 from Dr. Alba pending CT A/P and admission.    11:25 PM  Patient unable to complete CT abdomen/pelvis.   On reassessment, patient is in bed, had a non-bloody bowel movement.  Abdomen soft.  Will plan for repeat diazepam dosing and attempt CT again.     1:43 AM  CT A/P with distended urinary bladder.  Impression:  No acute process identified in the abdomen and pelvis, allowing for motion and artifact limitations.  Hysterectomy.  Mild atherosclerosis involving the abdominal aorta.  High placed.  Patient now resting comfortably.  I discussed the case with Hospital Medicine.  Admitted for further workup and management of altered mental status.  Urine drug screen presumptive positive for THC.

## 2022-01-10 NOTE — HPI
Ms. Grimaldo is a 61 F with with PMHx of thyroid disease, insomnia, anxiety, HLD who presents with altered mental status. Information is provided by patient's . Patient was watching football earlier today in her normal state of health, then around 6PM he found her curled up in a bedroom confused, not answering questions and appeared to be in pain.  denies previous episodes. Per , she is complaint with her medications but does not know the exact dosage of thyroid medication. Additionally endorses EtOH but could not commend on how much or how long but denies hx seizures or tremors. Had a similar response when asked about tobacco use. Denied any illicit drug use (marijuana, cocaine, heroin).     ED Course: hypertensive on arrival /103mmHg, otherwise stable. CT A/P and Head returned negative. UA consistent with bacteruria. UDS positive for THC. Labs significant for T4 1.64, TSH 0.017, Na 129. Given diazepam x 2 for agitation.

## 2022-01-10 NOTE — PHARMACY MED REC
"Admission Medication History     The home medication history was taken by Skye Branham.    You may go to "Admission" then "Reconcile Home Medications" tabs to review and/or act upon these items.      The home medication list has been updated by the Pharmacy department.    Please read ALL comments highlighted in yellow.    Please address this information as you see fit.     Feel free to contact us if you have any questions or require assistance.      The medications listed below were removed from the home medication list. Please reorder if appropriate:  Patient reports no longer taking the following medication(s):   SULFAMETHOXAZOLE-TRIM 800-160 MG        Medications listed below were obtained from: Patient/family, Medications brought from home and [employer] software- [employer]   SPOUSE WAS UNSURE OF ANY OTC MEDICATIONS.   Current Outpatient Medications on File Prior to Encounter   Medication Sig    albuterol (PROVENTIL/VENTOLIN HFA) 90 mcg/actuation inhaler Inhale 2 puffs into the lungs every 6 (six) hours as needed for Wheezing or Shortness of Breath.    carisoprodol (SOMA) 350 MG tablet Take 350 mg by mouth 3 (three) times daily.    diphenoxylate-atropine 2.5-0.025 mg (LOMOTIL) 2.5-0.025 mg per tablet Take 1 tablet by mouth every 6 (six) hours as needed for Diarrhea.    ergocalciferol (ERGOCALCIFEROL) 50,000 unit Cap Take 50,000 Units by mouth twice a week.    hydrocodone-acetaminophen 10-325mg (NORCO)  mg Tab Take 1 tablet by mouth every 8 (eight) hours as needed for Pain.    pravastatin (PRAVACHOL) 80 MG tablet Take 80 mg by mouth once daily.    traZODone (DESYREL) 100 MG tablet Take 100 mg by mouth nightly as needed for Insomnia.    valACYclovir (VALTREX) 500 MG tablet Take 500 mg by mouth once daily.    b complex vitamins capsule Take 1 capsule by mouth once daily.    levothyroxine (SYNTHROID) 125 MCG tablet Take 125 mcg by mouth once daily.    liothyronine (CYTOMEL) 5 MCG Tab TK 1 T PO QAM "             Potential issues to be addressed PRIOR TO DISCHARGE  Patient requires education regarding drug therapies    · LEVOTHYROXINE 125 MCG LAST FILLED AT PHARMACY ON 7/8/21 FOR A 30 DAY SUPPLY  · LIOTHYRONINE 5 MCG LAST FILLED AT PHARMACY ON 2/12/21 FOR A 30 DAY SUPPLY    Skye Branham  [id number]                    .

## 2022-01-10 NOTE — ED NOTES
Pt crying and trembling in pain. Reports 8/10 lower back pain; pressure like feeling. Med team noted. Pt repositioned with heating pack applied to affected area.

## 2022-01-10 NOTE — ASSESSMENT & PLAN NOTE
Na 129 on admission with acute onset confusion in setting of drug and alcohol use. No history of seizures: no. Euvolemic on exam.     DDx: Drug induced vs. hypovolemia vs. subclinical hypothyroidism vs. SIADH    PLAN:  -Monitor for acute changes in mental status   -Goal increase in serum Na of 6-8 mEq in 24 hours   -Fluid restriction 1200cc daily   -Discontinue offending medications   -Serum Osm, urine Osm, urine Cr, urine Na pending

## 2022-01-10 NOTE — PT/OT/SLP EVAL
Physical Therapy Evaluation    Patient Name:  Eden Kuhn   MRN:  [id number]    Recommendations:     Discharge Recommendations:  rehabilitation facility   Discharge Equipment Recommendations:  (TBD)   Barriers to discharge: Inaccessible home and Decreased caregiver support (due to her current LOF and 3 steps to enter her house)    Assessment:     Eden Kuhn is a 61 y.o. female admitted with a medical diagnosis of Acute metabolic encephalopathy.  She presents with the following impairments/functional limitations:  weakness,impaired endurance,impaired self care skills,impaired functional mobilty,gait instability,impaired balance,decreased upper extremity function,decreased lower extremity function,pain .     Pt albert session well w/ good participation but was very limited by pain along w/ lethargy. PTA she was IND w/ mobility/ADLs but she is currently limited by the above isted deficits requiring Jose(X2) for sit>stand and side steps along EOB. She is appropriate for acute PT services and will begin PT POC.    Rehab Prognosis: Good; patient would benefit from acute skilled PT services to address these deficits and reach maximum level of function.    Recent Surgery: * No surgery found *      Plan:     During this hospitalization, patient to be seen 3 x/week to address the identified rehab impairments via gait training,therapeutic activities,therapeutic exercises,neuromuscular re-education and progress toward the following goals:    · Plan of Care Expires:  02/09/22    Subjective     Chief Complaint: pain  Patient/Family Comments/goals: return to PLOF  Pain/Comfort:  · Pain Rating 1: 10/10  · Location - Side 1: Bilateral  · Location - Orientation 1: generalized  · Location 1: back  · Pain Addressed 1: Pre-medicate for activity,Reposition,Nurse notified,Cessation of Activity  · Pain Rating Post-Intervention 1: 10/10    Patients cultural, spiritual, Pentecostal conflicts given the current situation: no    Living  Environment:  Pt lives w/ her  in a 2SH w/ 3 steps to enter w/o handrails. Her bed/bathroom are on the 2nd floor. There is a left handrail for the stairs inside her home. Pt has a tub/shower combo.  Prior to admission, patients level of function was IND w/ mobility/ADLs w/o an AD. She drives.  Equipment used at home: none.  DME owned (not currently used): none.  Upon discharge, patient will have assistance from her  but he works.    Objective:     Co-treatment performed due to patient's multiple deficits requiring two skilled therapists to appropriately and safely assess patient's strength and endurance while facilitating functional tasks in addition to accommodating for patient's activity tolerance.     Communicated with nursing prior to session.  Patient found supine with telemetry,pulse ox (continuous),blood pressure cuff,jose catheter  upon PT entry to room.    General Precautions: Standard, fall   Orthopedic Precautions:N/A   Braces: N/A  Respiratory Status: Room air    Exams:  · Cognitive Exam:  Patient is oriented to Person, Place, Time and Situation  · Fine Motor Coordination:    · -       Intact  · Gross Motor Coordination:  WFL  · Postural Exam:  Patient presented with the following abnormalities:    · -       No postural abnormalities identified  · Sensation:    · -       Intact  light/touch hands/feet  · Skin Integrity/Edema:      · -       Skin integrity: Visible skin intact  · RLE ROM: WFL  · RLE Strength: WFL  · LLE ROM: WFL  · LLE Strength: WFL    Functional Mobility:  · Bed Mobility:    · Supine>sit on bed w/ ModA for trunk  · Sit>supine on bed w/ MaxA for trunk/BLE  · Limited by back pain  · Transfers:    · Sit<>stand to/from stretcher w/o AD w/ Jose(X2) to rise  · Cues for safety  · Gait:   · ~10 very small side steps along EOB w/ Jose(X2) and (B)HHA for stability  · Cues for posture, sequencing, and weight shifting  · Limited by pain  · Balance:   · Static sit at EOB w/  CGA/SBA  Static stand w/ (B)HHA/Jose(X2ppl) for stability, cues for posture    Therapeutic Activities and Exercises:   Pt and  were educated on PT role and POC. Both verb understanding.     AM-PAC 6 CLICK MOBILITY  Total Score:14     Patient left supine with all lines intact and call button in reach.    GOALS:   Multidisciplinary Problems     Physical Therapy Goals        Problem: Physical Therapy Goal    Goal Priority Disciplines Outcome Goal Variances Interventions   Physical Therapy Goal     PT, PT/OT Ongoing, Progressing     Description: Goals to be met by: 2022     Patient will increase functional independence with mobility by performin. Supine to sit with Stand-by Assistance  2. Sit to supine with Stand-by Assistance  3. Sit to stand transfer with Stand-by Assistance  4. Bed to chair transfer with Stand-by Assistance using Rolling Walker  5. Gait  x 50 feet with Stand-by Assistance using Rolling Walker.   6. Ascend/descend 3 stair with right Handrails Contact Guard Assistance                      History:     Past Medical History:   Diagnosis Date    Anxiety     Arthritis     Insomnia     Thyroid disease     hypothyroid       Past Surgical History:   Procedure Laterality Date    ANAL FISTULOTOMY  2002    bladder repair      CYSTOCELE REPAIR  2011    ESOPHAGOGASTRODUODENOSCOPY N/A 8/3/2018    Procedure: EGD (ESOPHAGOGASTRODUODENOSCOPY);  Surgeon: Adam Olivera MD;  Location: [address]);  Service: Endoscopy;  Laterality: N/A;  Patient is VIP patient.  Plan to do during the week of -.  May cancel as it gets closer if she improves, but want to have it on the books.    Patient requested this date due to insurance.    EYE SURGERY  2012    Bilateral upper lid blepharoplasty with levator fixation    HYSTERECTOMY         Time Tracking:     PT Received On: 01/10/22  PT Start Time: 915     PT Stop Time: 930  PT Total Time (min): 15 min     Billable  Minutes: Evaluation 15 and Total Time 15      01/10/2022

## 2022-01-10 NOTE — PLAN OF CARE
PT jose completed. Pt will begin PT POC.  Deanna Villafuerte, PT  1/10/2022    Problem: Physical Therapy Goal  Goal: Physical Therapy Goal  Description: Goals to be met by: 2022     Patient will increase functional independence with mobility by performin. Supine to sit with Stand-by Assistance  2. Sit to supine with Stand-by Assistance  3. Sit to stand transfer with Stand-by Assistance  4. Bed to chair transfer with Stand-by Assistance using Rolling Walker  5. Gait  x 50 feet with Stand-by Assistance using Rolling Walker.   6. Ascend/descend 3 stair with right Handrails Contact Guard Assistance     Outcome: Ongoing, Progressing

## 2022-01-11 VITALS
TEMPERATURE: 98 F | WEIGHT: 110 LBS | OXYGEN SATURATION: 96 % | RESPIRATION RATE: 20 BRPM | HEART RATE: 81 BPM | SYSTOLIC BLOOD PRESSURE: 184 MMHG | DIASTOLIC BLOOD PRESSURE: 89 MMHG | BODY MASS INDEX: 18.3 KG/M2

## 2022-01-11 PROBLEM — M54.50 PAIN OF LUMBAR SPINE: Status: ACTIVE | Noted: 2022-01-11

## 2022-01-11 LAB
ALBUMIN SERPL BCP-MCNC: 3.8 G/DL (ref 3.5–5.2)
ALP SERPL-CCNC: 45 U/L (ref 55–135)
ALT SERPL W/O P-5'-P-CCNC: 12 U/L (ref 10–44)
ANION GAP SERPL CALC-SCNC: 14 MMOL/L (ref 8–16)
AST SERPL-CCNC: 21 U/L (ref 10–40)
BASOPHILS # BLD AUTO: 0.06 K/UL (ref 0–0.2)
BASOPHILS NFR BLD: 0.4 % (ref 0–1.9)
BILIRUB SERPL-MCNC: 1.2 MG/DL (ref 0.1–1)
BUN SERPL-MCNC: 5 MG/DL (ref 8–23)
CALCIUM SERPL-MCNC: 9.2 MG/DL (ref 8.7–10.5)
CHLORIDE SERPL-SCNC: 100 MMOL/L (ref 95–110)
CO2 SERPL-SCNC: 19 MMOL/L (ref 23–29)
CREAT SERPL-MCNC: 0.6 MG/DL (ref 0.5–1.4)
DIFFERENTIAL METHOD: ABNORMAL
EOSINOPHIL # BLD AUTO: 0 K/UL (ref 0–0.5)
EOSINOPHIL NFR BLD: 0.3 % (ref 0–8)
ERYTHROCYTE [DISTWIDTH] IN BLOOD BY AUTOMATED COUNT: 12.7 % (ref 11.5–14.5)
EST. GFR  (AFRICAN AMERICAN): >60 ML/MIN/1.73 M^2
EST. GFR  (NON AFRICAN AMERICAN): >60 ML/MIN/1.73 M^2
GLUCOSE SERPL-MCNC: 73 MG/DL (ref 70–110)
HCT VFR BLD AUTO: 35.4 % (ref 37–48.5)
HGB BLD-MCNC: 12 G/DL (ref 12–16)
IMM GRANULOCYTES # BLD AUTO: 0.04 K/UL (ref 0–0.04)
IMM GRANULOCYTES NFR BLD AUTO: 0.3 % (ref 0–0.5)
LYMPHOCYTES # BLD AUTO: 3.7 K/UL (ref 1–4.8)
LYMPHOCYTES NFR BLD: 26.7 % (ref 18–48)
MAGNESIUM SERPL-MCNC: 1.8 MG/DL (ref 1.6–2.6)
MCH RBC QN AUTO: 33.6 PG (ref 27–31)
MCHC RBC AUTO-ENTMCNC: 33.9 G/DL (ref 32–36)
MCV RBC AUTO: 99 FL (ref 82–98)
MONOCYTES # BLD AUTO: 1.5 K/UL (ref 0.3–1)
MONOCYTES NFR BLD: 10.4 % (ref 4–15)
NEUTROPHILS # BLD AUTO: 8.6 K/UL (ref 1.8–7.7)
NEUTROPHILS NFR BLD: 61.9 % (ref 38–73)
NRBC BLD-RTO: 0 /100 WBC
PHOSPHATE SERPL-MCNC: 2.8 MG/DL (ref 2.7–4.5)
PLATELET # BLD AUTO: 294 K/UL (ref 150–450)
PMV BLD AUTO: 9.9 FL (ref 9.2–12.9)
POTASSIUM SERPL-SCNC: 3.3 MMOL/L (ref 3.5–5.1)
PROT SERPL-MCNC: 6.8 G/DL (ref 6–8.4)
RBC # BLD AUTO: 3.57 M/UL (ref 4–5.4)
SODIUM SERPL-SCNC: 133 MMOL/L (ref 136–145)
WBC # BLD AUTO: 13.94 K/UL (ref 3.9–12.7)

## 2022-01-11 PROCEDURE — 83735 ASSAY OF MAGNESIUM: CPT | Performed by: STUDENT IN AN ORGANIZED HEALTH CARE EDUCATION/TRAINING PROGRAM

## 2022-01-11 PROCEDURE — 25000003 PHARM REV CODE 250: Performed by: STUDENT IN AN ORGANIZED HEALTH CARE EDUCATION/TRAINING PROGRAM

## 2022-01-11 PROCEDURE — 80053 COMPREHEN METABOLIC PANEL: CPT | Performed by: STUDENT IN AN ORGANIZED HEALTH CARE EDUCATION/TRAINING PROGRAM

## 2022-01-11 PROCEDURE — 84100 ASSAY OF PHOSPHORUS: CPT | Performed by: STUDENT IN AN ORGANIZED HEALTH CARE EDUCATION/TRAINING PROGRAM

## 2022-01-11 PROCEDURE — 99239 HOSP IP/OBS DSCHRG MGMT >30: CPT | Mod: ,,, | Performed by: HOSPITALIST

## 2022-01-11 PROCEDURE — 85025 COMPLETE CBC W/AUTO DIFF WBC: CPT | Performed by: STUDENT IN AN ORGANIZED HEALTH CARE EDUCATION/TRAINING PROGRAM

## 2022-01-11 PROCEDURE — 36415 COLL VENOUS BLD VENIPUNCTURE: CPT | Performed by: STUDENT IN AN ORGANIZED HEALTH CARE EDUCATION/TRAINING PROGRAM

## 2022-01-11 PROCEDURE — 25000003 PHARM REV CODE 250

## 2022-01-11 PROCEDURE — 63600175 PHARM REV CODE 636 W HCPCS: Performed by: STUDENT IN AN ORGANIZED HEALTH CARE EDUCATION/TRAINING PROGRAM

## 2022-01-11 PROCEDURE — 94761 N-INVAS EAR/PLS OXIMETRY MLT: CPT

## 2022-01-11 PROCEDURE — 99239 PR HOSPITAL DISCHARGE DAY,>30 MIN: ICD-10-PCS | Mod: ,,, | Performed by: HOSPITALIST

## 2022-01-11 RX ORDER — KETOROLAC TROMETHAMINE 10 MG/1
10 TABLET, FILM COATED ORAL EVERY 6 HOURS
Qty: 20 TABLET | Refills: 0 | Status: SHIPPED | OUTPATIENT
Start: 2022-01-11 | End: 2022-01-16

## 2022-01-11 RX ORDER — SODIUM CHLORIDE 9 MG/ML
INJECTION, SOLUTION INTRAVENOUS CONTINUOUS
Status: DISCONTINUED | OUTPATIENT
Start: 2022-01-11 | End: 2022-01-11 | Stop reason: HOSPADM

## 2022-01-11 RX ORDER — LIDOCAINE 50 MG/G
1 PATCH TOPICAL DAILY
Qty: 7 PATCH | Refills: 0 | Status: SHIPPED | OUTPATIENT
Start: 2022-01-11 | End: 2022-01-18

## 2022-01-11 RX ORDER — KETOROLAC TROMETHAMINE 10 MG/1
10 TABLET, FILM COATED ORAL EVERY 6 HOURS
Qty: 20 TABLET | Refills: 0 | Status: SHIPPED | OUTPATIENT
Start: 2022-01-11 | End: 2022-01-11 | Stop reason: SDUPTHER

## 2022-01-11 RX ORDER — HYDROCODONE BITARTRATE AND ACETAMINOPHEN 5; 325 MG/1; MG/1
1 TABLET ORAL EVERY 8 HOURS PRN
Qty: 15 TABLET | Refills: 0 | Status: SHIPPED | OUTPATIENT
Start: 2022-01-11 | End: 2023-10-11

## 2022-01-11 RX ORDER — LIDOCAINE 50 MG/G
1 PATCH TOPICAL DAILY
Qty: 7 PATCH | Refills: 0 | Status: SHIPPED | OUTPATIENT
Start: 2022-01-11 | End: 2022-01-11

## 2022-01-11 RX ORDER — POTASSIUM CHLORIDE 20 MEQ/1
40 TABLET, EXTENDED RELEASE ORAL ONCE
Status: DISCONTINUED | OUTPATIENT
Start: 2022-01-11 | End: 2022-01-11

## 2022-01-11 RX ORDER — POTASSIUM CHLORIDE 20 MEQ/1
40 TABLET, EXTENDED RELEASE ORAL ONCE
Status: COMPLETED | OUTPATIENT
Start: 2022-01-11 | End: 2022-01-11

## 2022-01-11 RX ORDER — METHOCARBAMOL 500 MG/1
500 TABLET, FILM COATED ORAL 4 TIMES DAILY
Qty: 40 TABLET | Refills: 0 | Status: SHIPPED | OUTPATIENT
Start: 2022-01-11 | End: 2022-01-11

## 2022-01-11 RX ORDER — METHOCARBAMOL 500 MG/1
500 TABLET, FILM COATED ORAL 4 TIMES DAILY
Qty: 40 TABLET | Refills: 0 | Status: SHIPPED | OUTPATIENT
Start: 2022-01-11 | End: 2022-01-21

## 2022-01-11 RX ADMIN — TRAZODONE HYDROCHLORIDE 25 MG: 50 TABLET ORAL at 01:01

## 2022-01-11 RX ADMIN — LORAZEPAM 2 MG: 1 TABLET ORAL at 01:01

## 2022-01-11 RX ADMIN — THERA TABS 1 TABLET: TAB at 10:01

## 2022-01-11 RX ADMIN — LORAZEPAM 2 MG: 1 TABLET ORAL at 08:01

## 2022-01-11 RX ADMIN — HYDRALAZINE HYDROCHLORIDE 25 MG: 25 TABLET, FILM COATED ORAL at 04:01

## 2022-01-11 RX ADMIN — HYDROCODONE BITARTRATE AND ACETAMINOPHEN 1 TABLET: 10; 325 TABLET ORAL at 01:01

## 2022-01-11 RX ADMIN — METHOCARBAMOL 500 MG: 500 TABLET ORAL at 08:01

## 2022-01-11 RX ADMIN — METHOCARBAMOL 500 MG: 500 TABLET ORAL at 01:01

## 2022-01-11 RX ADMIN — MORPHINE SULFATE 3 MG: 2 INJECTION, SOLUTION INTRAMUSCULAR; INTRAVENOUS at 10:01

## 2022-01-11 RX ADMIN — LEVOTHYROXINE SODIUM 137 MCG: 0.14 TABLET ORAL at 06:01

## 2022-01-11 RX ADMIN — POTASSIUM CHLORIDE 40 MEQ: 1500 TABLET, EXTENDED RELEASE ORAL at 07:01

## 2022-01-11 RX ADMIN — PRAVASTATIN SODIUM 40 MG: 40 TABLET ORAL at 10:01

## 2022-01-11 RX ADMIN — FOLIC ACID 1 MG: 1 TABLET ORAL at 10:01

## 2022-01-11 RX ADMIN — MORPHINE SULFATE 3 MG: 2 INJECTION, SOLUTION INTRAMUSCULAR; INTRAVENOUS at 06:01

## 2022-01-11 RX ADMIN — HYDROCODONE BITARTRATE AND ACETAMINOPHEN 1 TABLET: 10; 325 TABLET ORAL at 07:01

## 2022-01-11 NOTE — ASSESSMENT & PLAN NOTE
Drinks beers, undetermined time   Last drink today   H/o withdrawal or withdrawal seizures: patient unable to verify but per  no    Plan:  - Thiamine, folate, multivitamin use encouraged  - Encouraged to reduce her drinking, and emphasized the importance of not mixing EtOH with pain meds or marijuana

## 2022-01-11 NOTE — PLAN OF CARE
Pt in bed, resp even and unlabored.  Alert and oriented. PRN pain meds were given to Pt for c/o. No s/s of distress at this time.  Call light in reach.  Will continue to monitor.

## 2022-01-11 NOTE — NURSING
Patient assisted off unit floor via wheelchair per charge nurse with  at side and personal belongings in her possession. No further needs noted.

## 2022-01-11 NOTE — NURSING
Patient discharged to home with home health. Peripheral IV and telemetry monitor discontinued. Patient alert and oriented. Discharge instructions and paperwork administered to patient with  bedside. Ordered prescription for pain med clarified by MD via phone call. Denied any further questions. Discharge meds delivered bedside at this time. Discharge transport requested.

## 2022-01-11 NOTE — PLAN OF CARE
Problem: Infection  Goal: Absence of Infection Signs and Symptoms  Outcome: Met     Problem: Adult Inpatient Plan of Care  Goal: Plan of Care Review  Outcome: Met  Goal: Patient-Specific Goal (Individualized)  Outcome: Met  Goal: Absence of Hospital-Acquired Illness or Injury  Outcome: Met  Goal: Optimal Comfort and Wellbeing  Outcome: Met  Goal: Readiness for Transition of Care  Outcome: Met

## 2022-01-11 NOTE — ASSESSMENT & PLAN NOTE
Pt awoke in ED with Lumbar back pain, 8/10. She says she sees an outside MD and is prescribed NORCO for it.  MRI lumbar spine: edema around facets of L3, L4, and L5.    PLAN:  -Continue home NORCO regimen of  q8h PRN  -Start NORCO 5-325 q6h PRN for breakthrough pain  -Home Health with PT ordered

## 2022-01-11 NOTE — ASSESSMENT & PLAN NOTE
Na 129 on admission with acute onset confusion in setting of drug and alcohol use. No history of seizures: no. Euvolemic on exam.     Pt says her Na is chronically low.  Na on .    PLAN:  -Monitor for acute changes in mental status   -Emphasized need for PO electrolytes in coming few days

## 2022-01-11 NOTE — HOSPITAL COURSE
Ms. Kuhn was admitted to -3 for further evaluation and treatment of her symptoms.  Her cognition had returned to baseline while in the ED, and by the time she arrived on the floor her tremors had decreased as well.  She was complaining of new, acute onsetlower back pain in the ED, so we got an MRI that showed some edema around the facets of L3, L4 and L5 but no fractures.  We gave her an IV Toradol shot as well as 3 mg of morphine, but unfortunately these provided no relief.  The next morning we set up PT via , additional pain control meds, and follow-up instructions.  She was then MI'ed in stable condition.

## 2022-01-11 NOTE — DISCHARGE SUMMARY
[facility] - Telemetry [facility] Medicine  Discharge Summary      Patient Name: Eden Kuhn  MRN: [id number]  Patient Class: IP- Inpatient  Admission Date: 1/9/2022  Hospital Length of Stay: 1 days  Discharge Date and Time:  01/11/2022 1:40 PM  Attending Physician: Moraima Ritchie*   Discharging Provider: Rodney Horn MD  Primary Care Provider: Chino Figueroa MD      HPI:   Ms. Kuhn is a 61 F with with PMHx of thyroid disease, insomnia, anxiety, HLD who presents with altered mental status. Information is provided by patient's . Patient was watching football earlier today in her normal state of health, then around 6PM he found her curled up in a bedroom confused, not answering questions and appeared to be in pain.  denies previous episodes. Per , she is complaint with her medications but does not know the exact dosage of thyroid medication. Additionally endorses EtOH but could not commend on how much or how long but denies hx seizures or tremors. Had a similar response when asked about tobacco use. Denied any illicit drug use (marijuana, cocaine, heroin).     ED Course: hypertensive on arrival /103mmHg, otherwise stable. CT A/P and Head returned negative. UA consistent with bacteruria. UDS positive for THC. Labs significant for T4 1.64, TSH 0.017, Na 129. Given diazepam x 2 for agitation.           * No surgery found *      Hospital Course:   Ms. Kuhn was admitted to -3 for further evaluation and treatment of her symptoms.  Her cognition had returned to baseline while in the ED, and by the time she arrived on the floor her tremors had decreased as well.  She was complaining of new, acute onsetlower back pain in the ED, so we got an MRI that showed some edema around the facets of L3, L4 and L5 but no fractures.  We gave her an IV Toradol shot as well as 3 mg of morphine, but unfortunately these provided no relief.  The next morning we set up PT via ,  additional pain control meds, and follow-up instructions.  She was then DC'ed in stable condition.       Goals of Care Treatment Preferences:  Code Status: Full Code      Consults:     * Acute metabolic encephalopathy  61 F admitted for altered mental status with unclear history of EtOH use with EtoH level elevated, UDS positive for THC with no prior history of similar presentation. High suspicion for drug induced panic attack/paroid episode. CT head, A/P with negative for any acute findings findings. Low oncern for infection given acute onset without history of seizures, afebrile on presentation along with labs without leukocytosis although UA showed bacteruria. Free T4 mildly elevated in patient with acute mental status change may also be a factor.  mentions recent changes to dosage per PCP.     Free T4 1.64; TSH 0.017  Salicylate level undetectable   EtOH level 101  Differentials include the following:  Metabolic: electrolytes, TSH  Neuro: EtOH intox or withdrawal  CT head, A/P negative for acute process   UA with bacteruria, but no sxs     PLAN  - Monitor for acute changes in mentation  - Should encephalopathy return, consider rechecking UA    Pain of lumbar spine  Pt awoke in ED with Lumbar back pain, 8/10. She says she sees an outside MD and is prescribed NORCO for it.  MRI lumbar spine: edema around facets of L3, L4, and L5.    PLAN:  -Continue home NORCO regimen of  q8h PRN  -Start NORCO 5-325 q6h PRN for breakthrough pain  -Home Health with PT ordered    Hyponatremia  Na 129 on admission with acute onset confusion in setting of drug and alcohol use. No history of seizures: no. Euvolemic on exam.     Pt says her Na is chronically low.  Na on .    PLAN:  -Monitor for acute changes in mental status   -Emphasized need for PO electrolytes in coming few days      Hypothyroidism  Continue home regimen.      EtOH dependence  Drinks beers, undetermined time   Last drink today   H/o withdrawal or  withdrawal seizures: patient unable to verify but per  no    Plan:  - Thiamine, folate, multivitamin use encouraged  - Encouraged to reduce her drinking, and emphasized the importance of not mixing EtOH with pain meds or marijuana    Upper abdominal pain  Please see acute metabolic encephalopathy         Final Active Diagnoses:    Diagnosis Date Noted POA    PRINCIPAL PROBLEM:  Acute metabolic encephalopathy [G93.41] 01/10/2022 Unknown    Pain of lumbar spine [M54.50] 01/11/2022 Yes    EtOH dependence [F10.20] 01/10/2022 Unknown    Hypothyroidism [E03.9] 01/10/2022 Unknown    Hyponatremia [E87.1] 01/10/2022 Unknown    Upper abdominal pain [R10.10] 08/03/2018 Yes      Problems Resolved During this Admission:       Discharged Condition: stable    Disposition:     Follow Up:   Follow-up Information     Chino Figueroa MD On 1/18/2022.    Specialty: Endocrinology  Why: Hospital follow up appointment at 3:15 p.m.    Please bring discharge paperwork and any curent medications  Contact information:  [address] [facility]  [place] [id number] [phone]                       Patient Instructions:   No discharge procedures on file.    Significant Diagnostic Studies: Labs:   CMP   Recent Labs   Lab 01/09/22  2040 01/10/22  0426 01/11/22  0445   * 138 133*   K 3.5 3.8 3.3*   CL 97 105 100   CO2 21* 20* 19*    104 73   BUN 4* 4* 5*   CREATININE 0.6 0.6 0.6   CALCIUM 8.9 9.6 9.2   PROT 7.1 7.6 6.8   ALBUMIN 4.0 4.1 3.8   BILITOT 0.5 0.5 1.2*   ALKPHOS 46* 48* 45*   AST 25 25 21   ALT 16 16 12   ANIONGAP 11 13 14   ESTGFRAFRICA >60.0 >60.0 >60.0   EGFRNONAA >60.0 >60.0 >60.0   , CBC   Recent Labs   Lab 01/09/22  2040 01/09/22  2040 01/10/22  0426 01/10/22  0426 01/11/22  0445   WBC 8.46  --  12.43  --  13.94*   HGB 12.3  --  13.3  --  12.0   HCT 34.6*   < > 38.1   < > 35.4*     --  353  --  294    < > = values in this interval not displayed.   , Troponin No results for input(s): TROPONINI in the last  168 hours. and All labs within the past 24 hours have been reviewed    Pending Diagnostic Studies:     None         Medications:  Reconciled Home Medications:      Medication List      START taking these medications    ketorolac 10 mg tablet  Commonly known as: TORADOL  Take 1 tablet (10 mg total) by mouth every 6 (six) hours. for 5 days     LIDOcaine 5 %  Commonly known as: LIDODERM  Place 1 patch onto the skin once daily. Remove & Discard patch within 12 hours or as directed by MD for 7 days     methocarbamoL 500 MG Tab  Commonly known as: ROBAXIN  Take 1 tablet (500 mg total) by mouth 4 (four) times daily. for 10 days        CHANGE how you take these medications    * HYDROcodone-acetaminophen  mg per tablet  Commonly known as: NORCO  Take 1 tablet by mouth every 8 (eight) hours as needed for Pain.  What changed: Another medication with the same name was added. Make sure you understand how and when to take each.     * HYDROcodone-acetaminophen 5-325 mg per tablet  Commonly known as: NORCO  Take 1 tablet by mouth every 8 (eight) hours as needed for Pain.  What changed: You were already taking a medication with the same name, and this prescription was added. Make sure you understand how and when to take each.         * This list has 2 medication(s) that are the same as other medications prescribed for you. Read the directions carefully, and ask your doctor or other care provider to review them with you.            CONTINUE taking these medications    albuterol 90 mcg/actuation inhaler  Commonly known as: PROVENTIL/VENTOLIN HFA  Inhale 2 puffs into the lungs every 6 (six) hours as needed for Wheezing or Shortness of Breath.     b complex vitamins capsule  Take 1 capsule by mouth once daily.     carisoprodoL 350 MG tablet  Commonly known as: SOMA  Take 350 mg by mouth 3 (three) times daily.     diphenoxylate-atropine 2.5-0.025 mg 2.5-0.025 mg per tablet  Commonly known as: LOMOTIL  Take 1 tablet by mouth every 6  (six) hours as needed for Diarrhea.     ergocalciferol 50,000 unit Cap  Commonly known as: ERGOCALCIFEROL  Take 50,000 Units by mouth twice a week.     levothyroxine 125 MCG tablet  Commonly known as: SYNTHROID  Take 125 mcg by mouth once daily.     liothyronine 5 MCG Tab  Commonly known as: CYTOMEL  TK 1 T PO QAM     pravastatin 80 MG tablet  Commonly known as: PRAVACHOL  Take 80 mg by mouth once daily.     traZODone 100 MG tablet  Commonly known as: DESYREL  Take 100 mg by mouth nightly as needed for Insomnia.     valACYclovir 500 MG tablet  Commonly known as: VALTREX  Take 500 mg by mouth once daily.            Indwelling Lines/Drains at time of discharge:   Lines/Drains/Airways     None                 Time spent on the discharge of patient: 45 minutes         Rodney Horn MD  Department of Hospital Medicine  [facility] - Telemetry Stepdown

## 2022-01-11 NOTE — PLAN OF CARE
[facility] - Telemetry Stepdown  Initial Discharge Assessment       Primary Care Provider: Chino Figueroa MD    Admission Diagnosis: Chest pain [R07.9]  Altered mental status, unspecified altered mental status type [R41.82]  Altered behavior [R46.89]  AMS (altered mental status) [R41.82]    Admission Date: 1/9/2022  Expected Discharge Date: 1/11/2022    Discharge Barriers Identified: None    Payor: BLUE [employer] BLUE SHIELD / Plan: BCBS ALL OUT OF STATE / Product Type: PPO /     Extended Emergency Contact Information  Primary Emergency Contact: Mary AnnGregg  Address: [address] [place] [address] [id number] [place] [address]  Home Phone: [phone]  Mobile Phone: [phone]  Relation: Spouse    Discharge Plan A: Home,Home Health  Discharge Plan B: Home,Home with family      [facility] [place] [address]  Phone: [phone] Fax: [phone]    [id number] [address] [address]  Phone: [phone] Fax: [phone]      Initial Assessment (most recent)     Adult Discharge Assessment - 01/11/22 1111        Discharge Assessment    Assessment Type Discharge Planning Assessment     Confirmed/corrected address, phone number and insurance Yes     Confirmed Demographics Correct on Facesheet     Source of Information patient     When was your last doctors appointment? 11/22/21     Communicated CHANELLE with patient/caregiver Yes     Reason For Admission Chest Pain     Lives With spouse     Do you expect to return to your current living situation? Yes     Do you have help at home or someone to help you manage your care at home? Yes     Who are your caregiver(s) and their phone number(s)? Sanchez Reese, spouse [phone]     Prior to hospitilization cognitive status: Alert/Oriented     Current cognitive status:  Alert/Oriented     Walking or Climbing Stairs Difficulty none     Dressing/Bathing Difficulty none     Equipment Currently Used at Home none     Readmission within 30 days? No     Patient currently being followed by outpatient case management? No     Do you currently have service(s) that help you manage your care at home? No     Do you take prescription medications? Yes     Do you have prescription coverage? Yes     Coverage BCBS     Do you have any problems affording any of your prescribed medications? No     Is the patient taking medications as prescribed? yes     Who is going to help you get home at discharge? Spouse-Gregg Reese [phone]     How do you get to doctors appointments? car, drives self     Are you on dialysis? No     Do you take coumadin? No     Discharge Plan A Home;Home Health     Discharge Plan B Home;Home with family     Discharge Plan discussed with: Patient     Discharge Barriers Identified None        Relationship/Environment    Name(s) of Who Lives With Patient Gregg Reese (Spouse)               Sw met with pt to complete initial discharge assessment.  SW provided pt with discharge booklet.  No hospital readmissions within the last 30 days.  SW answered all questions.      Pt lives with her .  Pt reported her home has a few steps and 2 platforms to enter.  Pt denied use of DME.  Client reported she is independent with her ADLS.  Pt denied coumadin and dialysis. Pt has received HH services in the past and is agreeable with referral to [facility].  Pt denied any outpatient or behavioral health services.  Pt's family will provide transportation home.      Disp:  [facility].      Verónica Elizabeth LMSW  PRN-  [facility]  Ext. [phone]

## 2022-01-11 NOTE — PLAN OF CARE
SW met with pt to discuss discharge plan.  Pt advised SW she does not want inpt rehab.  Pt would like  services.  Referral sent to [facility].  Pt will provide transportation home.     1:29 PM  Per Benny with [facility], pt accepted.       01/11/22 1108   Post-Acute Status   Post-Acute Authorization Home Health   Home Health Status Referrals Sent     Verónica Elizabeth LMSW  PRN-  [facility]  Ext. [phone]

## 2022-01-11 NOTE — ASSESSMENT & PLAN NOTE
61 F admitted for altered mental status with unclear history of EtOH use with EtoH level elevated, UDS positive for THC with no prior history of similar presentation. High suspicion for drug induced panic attack/paroid episode. CT head, A/P with negative for any acute findings findings. Low oncern for infection given acute onset without history of seizures, afebrile on presentation along with labs without leukocytosis although UA showed bacteruria. Free T4 mildly elevated in patient with acute mental status change may also be a factor.  mentions recent changes to dosage per PCP.     Free T4 1.64; TSH 0.017  Salicylate level undetectable   EtOH level 101  Differentials include the following:  Metabolic: electrolytes, TSH  Neuro: EtOH intox or withdrawal  CT head, A/P negative for acute process   UA with bacteruria, but no sxs     PLAN  - Monitor for acute changes in mentation  - Should encephalopathy return, consider rechecking UA

## 2022-01-11 NOTE — PLAN OF CARE
[facility] - Telemetry Stepdown  Discharge Final Note    Primary Care Provider: Chino Figueroa MD    Expected Discharge Date: 1/11/2022    Final Discharge Note (most recent)     Final Note - 01/11/22 1557        Final Note    Assessment Type Final Discharge Note     Anticipated Discharge Disposition Home-Health Care [facility] [place]     Hospital Resources/Appts/Education Provided Provided patient/caregiver with written discharge plan information        Post-Acute Status    Post-Acute Authorization Home Health     Home Health Status Set-up Complete/Auth obtained     Discharge Delays None known at this time                 Important Message from Medicare             Contact Info     Chino Figueroa MD   Specialty: Endocrinology   Relationship: PCP - General    [address] [address]   Phone: [phone]       Next Steps: Follow up on 1/18/2022    Instructions: Hospital follow up appointment at 3:15 p.m.    Please bring discharge paperwork and any curent medications        Pt accepted by [facility].  Pt's  will provide transportation home.      Verónica Elizabeth LMSW  PRN-  [facility]  Ext. [phone]

## 2022-01-11 NOTE — PT/OT/SLP PROGRESS
Physical Therapy      Patient Name:  Eden Kuhn   MRN:  [id number]    At 11:22, Eden Kuhn presented sitting edge of bed. She stated that she plans to discharge home with . She is agreeable to physical therapy for stair training. PT measured patient's BP. Pt's /107 (). PT re-measured patient's BP. Pt's /98. PT notified RN. Patient attempted to check on patient at 1510. Patient was discharging from the hospital.    Nita Jones, PT, DPT   1/11/2022

## 2022-01-17 ENCOUNTER — OFFICE VISIT (OUTPATIENT)
Dept: URGENT CARE | Facility: CLINIC | Age: 62
End: 2022-01-17
Payer: COMMERCIAL

## 2022-01-17 VITALS
HEIGHT: 65 IN | DIASTOLIC BLOOD PRESSURE: 85 MMHG | OXYGEN SATURATION: 98 % | SYSTOLIC BLOOD PRESSURE: 129 MMHG | WEIGHT: 110 LBS | HEART RATE: 79 BPM | RESPIRATION RATE: 18 BRPM | TEMPERATURE: 99 F | BODY MASS INDEX: 18.33 KG/M2

## 2022-01-17 DIAGNOSIS — R07.89 CHEST WALL TENDERNESS: ICD-10-CM

## 2022-01-17 DIAGNOSIS — F17.200 SMOKER: ICD-10-CM

## 2022-01-17 DIAGNOSIS — R06.02 SHORTNESS OF BREATH: Primary | ICD-10-CM

## 2022-01-17 DIAGNOSIS — R07.9 CHEST PAIN, UNSPECIFIED TYPE: ICD-10-CM

## 2022-01-17 DIAGNOSIS — R07.89 ATYPICAL CHEST PAIN: ICD-10-CM

## 2022-01-17 LAB
CTP QC/QA: YES
SARS-COV-2 RDRP RESP QL NAA+PROBE: NEGATIVE

## 2022-01-17 PROCEDURE — 3079F DIAST BP 80-89 MM HG: CPT | Mod: CPTII,S$GLB,, | Performed by: INTERNAL MEDICINE

## 2022-01-17 PROCEDURE — 99215 PR OFFICE/OUTPT VISIT, EST, LEVL V, 40-54 MIN: ICD-10-PCS | Mod: 25,S$GLB,, | Performed by: INTERNAL MEDICINE

## 2022-01-17 PROCEDURE — U0002: ICD-10-PCS | Mod: QW,S$GLB,, | Performed by: INTERNAL MEDICINE

## 2022-01-17 PROCEDURE — 96372 THER/PROPH/DIAG INJ SC/IM: CPT | Mod: S$GLB,,, | Performed by: INTERNAL MEDICINE

## 2022-01-17 PROCEDURE — 3074F SYST BP LT 130 MM HG: CPT | Mod: CPTII,S$GLB,, | Performed by: INTERNAL MEDICINE

## 2022-01-17 PROCEDURE — 93005 ELECTROCARDIOGRAM TRACING: CPT | Mod: S$GLB,,, | Performed by: INTERNAL MEDICINE

## 2022-01-17 PROCEDURE — 3008F BODY MASS INDEX DOCD: CPT | Mod: CPTII,S$GLB,, | Performed by: INTERNAL MEDICINE

## 2022-01-17 PROCEDURE — 93010 EKG 12-LEAD: ICD-10-PCS | Mod: S$GLB,,, | Performed by: INTERNAL MEDICINE

## 2022-01-17 PROCEDURE — 3074F PR MOST RECENT SYSTOLIC BLOOD PRESSURE < 130 MM HG: ICD-10-PCS | Mod: CPTII,S$GLB,, | Performed by: INTERNAL MEDICINE

## 2022-01-17 PROCEDURE — 3079F PR MOST RECENT DIASTOLIC BLOOD PRESSURE 80-89 MM HG: ICD-10-PCS | Mod: CPTII,S$GLB,, | Performed by: INTERNAL MEDICINE

## 2022-01-17 PROCEDURE — 3008F PR BODY MASS INDEX (BMI) DOCUMENTED: ICD-10-PCS | Mod: CPTII,S$GLB,, | Performed by: INTERNAL MEDICINE

## 2022-01-17 PROCEDURE — 1159F MED LIST DOCD IN RCRD: CPT | Mod: CPTII,S$GLB,, | Performed by: INTERNAL MEDICINE

## 2022-01-17 PROCEDURE — 93010 ELECTROCARDIOGRAM REPORT: CPT | Mod: S$GLB,,, | Performed by: INTERNAL MEDICINE

## 2022-01-17 PROCEDURE — 71046 XR CHEST PA AND LATERAL: ICD-10-PCS | Mod: S$GLB,,, | Performed by: RADIOLOGY

## 2022-01-17 PROCEDURE — U0002 COVID-19 LAB TEST NON-CDC: HCPCS | Mod: QW,S$GLB,, | Performed by: INTERNAL MEDICINE

## 2022-01-17 PROCEDURE — 96372 PR INJECTION,THERAP/PROPH/DIAG2ST, IM OR SUBCUT: ICD-10-PCS | Mod: S$GLB,,, | Performed by: INTERNAL MEDICINE

## 2022-01-17 PROCEDURE — 99215 OFFICE O/P EST HI 40 MIN: CPT | Mod: 25,S$GLB,, | Performed by: INTERNAL MEDICINE

## 2022-01-17 PROCEDURE — 93005 EKG 12-LEAD: ICD-10-PCS | Mod: S$GLB,,, | Performed by: INTERNAL MEDICINE

## 2022-01-17 PROCEDURE — 1159F PR MEDICATION LIST DOCUMENTED IN MEDICAL RECORD: ICD-10-PCS | Mod: CPTII,S$GLB,, | Performed by: INTERNAL MEDICINE

## 2022-01-17 PROCEDURE — 71046 X-RAY EXAM CHEST 2 VIEWS: CPT | Mod: S$GLB,,, | Performed by: RADIOLOGY

## 2022-01-17 RX ORDER — KETOROLAC TROMETHAMINE 30 MG/ML
15 INJECTION, SOLUTION INTRAMUSCULAR; INTRAVENOUS
Status: COMPLETED | OUTPATIENT
Start: 2022-01-17 | End: 2022-01-17

## 2022-01-17 RX ADMIN — KETOROLAC TROMETHAMINE 15 MG: 30 INJECTION, SOLUTION INTRAMUSCULAR; INTRAVENOUS at 11:01

## 2022-01-17 NOTE — PROGRESS NOTES
"Subjective:       Patient ID: Eden Kuhn is a 61 y.o. female.    Vitals:  height is 5' 5" (1.651 m) and weight is 49.9 kg (110 lb). Her temperature is 98.5 °F (36.9 °C). Her blood pressure is 121/74 and her pulse is 82. Her respiration is 16 and oxygen saturation is 98%.     Chief Complaint: Shortness of Breath    Pt states that the SOB started wed and is rapidly getting worst. Pt is requesting an x ray .    Shortness of Breath  This is a new problem. The current episode started in the past 7 days (WEDNESDAY ). The problem has been gradually worsening. Associated symptoms include chest pain and wheezing. Pertinent negatives include no claudication, ear pain, fever, headaches, rhinorrhea, sore throat, syncope or vomiting. The symptoms are aggravated by any activity. Associated symptoms comments: Chest pain is like stabbing. . Risk factors include smoking. She has tried nothing for the symptoms. The treatment provided no relief. Her past medical history is significant for bronchiolitis and pneumonia. There is no history of allergies, asthma, COPD or DVT. (Bronchiolitis last year.)     This is a 61-year-old female with past medical history of anxiety, arthritis, thyroid disease, tobacco use who presents for evaluation of chest pain and shortness of breath that has occurred since Wednesday.  Patient recently had a hospital admission for AMS due to EToh and THC use.  She states on Wednesday she started developing a sharp left-sided chest pain located underneath the breast wrapping around her torso.  The pain is exacerbated with coughing, breathing deeply or movement of the torso. She denies productive cough, fever, wheezing recently. She is a current smoker. She states she had a cold or flu (tested negative with home test) in the week prior to her chest pain starting with coughing, chills, nasal congestion.  She states she jogs twice daily and prior to the pain starting she has never had chest pain with jogging.  " She has no cardiac history, no cancer history. No recent immobilization        Constitution: Negative for chills, fatigue and fever.   HENT: Negative for ear pain and sore throat.    Neck: Negative for neck swelling.   Cardiovascular: Positive for chest pain. Negative for passing out.   Respiratory: Positive for shortness of breath and wheezing. Negative for asthma.    Gastrointestinal: Negative for vomiting.   Musculoskeletal: Negative for joint swelling.   Allergic/Immunologic: Negative for asthma.   Neurological: Negative for headaches.       Objective:      Physical Exam   Constitutional:  Non-toxic appearance. She does not appear ill. No distress. normal  HENT:   Head: Normocephalic and atraumatic.   Ears:   Right Ear: Tympanic membrane, external ear and ear canal normal.   Left Ear: Tympanic membrane, external ear and ear canal normal.   Nose: Nose normal. No rhinorrhea or congestion.   Mouth/Throat: Mucous membranes are normal. Mucous membranes are moist. No oropharyngeal exudate, posterior oropharyngeal edema, posterior oropharyngeal erythema or tonsillar abscesses. Tonsils are 1+ on the right. Tonsils are 1+ on the left. No tonsillar exudate. Oropharynx is clear.   Eyes: Conjunctivae and lids are normal.   Cardiovascular: Normal rate, regular rhythm, normal heart sounds and normal pulses.   No murmur heard.  Pulses:       Radial pulses are 2+ on the right side and 2+ on the left side. Exam reveals no gallop.   Pulmonary/Chest: Effort normal and breath sounds normal. No accessory muscle usage. No tachypnea. No respiratory distress. She has no wheezes. She has no rhonchi. She has no rales. She exhibits tenderness (She has rchest tenderness that reproduces her chest pain on the left anterior chest wall).    Comments: Diminished at bases    Abdominal: Normal appearance and bowel sounds are normal. Soft. There is no abdominal tenderness. There is no rebound and no guarding.   Musculoskeletal:      Right lower  leg: No edema.      Left lower leg: No edema.   Lymphadenopathy:        Right cervical: No superficial cervical adenopathy present.       Left cervical: No superficial cervical adenopathy present.   Neurological: no focal deficit. She is alert. She has normal motor skills, normal sensation and intact cranial nerves. She displays no weakness, facial symmetry and no dysarthria. No cranial nerve deficit or sensory deficit. Gait and coordination normal. GCS eye subscore is 4. GCS verbal subscore is 5. GCS motor subscore is 6.   Skin: Skin is not diaphoretic.   Nursing note and vitals reviewed.          EKG independently interpreted.  Normal sinus rhythm.  Ventricular rate 103 beats per minute.  No STEMI, no abnormal T-wave inversions no ST segment depressions.  When compared to previous EKG ventricular rate has increased by 33 beats per minute, PVCs no longer seen, rightward axis has normalized.    XR CHEST PA AND LATERAL    Result Date: 1/17/2022  EXAMINATION: XR CHEST PA AND LATERAL CLINICAL HISTORY: Shortness of breath TECHNIQUE: PA and lateral views of the chest were performed. COMPARISON: Chest radiograph from 04/10/2021 FINDINGS: The lungs are clear, with normal appearance of pulmonary vasculature and no pleural effusion or pneumothorax. The cardiac silhouette is normal in size. The hilar and mediastinal contours are unremarkable. Bones are intact.     No acute abnormality. Electronically signed by: Kd Servin MD Date:    01/17/2022 Time:    10:59    Patient walked for one minute in clinic, her heart rate increased to the mid 90s during the test. Oxygen saturation was mantained >98% the entire time during the test.     Assessment:       1. Shortness of breath    2. Chest pain, unspecified type    3. Smoker          Plan:         Shortness of breath  -     POCT COVID-19 Rapid Screening  -     IN OFFICE EKG 12-LEAD (to [facility])  -     XR CHEST PA AND LATERAL; Future; Expected date: 01/17/2022    Chest pain,  unspecified type  -     IN OFFICE EKG 12-LEAD (to [facility])  -     XR CHEST PA AND LATERAL; Future; Expected date: 01/17/2022    Smoker              Additional MDM:   Differential Diagnosis:   Symptom: Chest pain. <> The follow diagnoses were considered and will be evaluated: Costochondritis, Pleural Effusion, Pleurisy, Pneumonia and Rib Fracture. The follow diagnoses were considered, but were felt to be of low probability: Acute MI, Angina Pectoris, Aortic Dissection, Dissecting Aorta, Esophageal Rupture, Myocardial Ischemia, Pericarditis, Pneumothorax, Pulmonary Embolism, ST Elevation MI and Unstable Angina.     PERC Rule:   Age is greater than or equal to 50 = 1.0  Heart Rate is greater than or equal to 100 = 1.0  SaO2 on room air < 95% = 0.0  Unilateral leg swelling = 0.0  Hemoptysis = 0.0  Recent surgery or trauma = 0.0  Prior PE or DVT =  0.0  Hormone use = 0.00  PERC Score = 2    Well's Criteria Score:  -Clinical symptoms of DVT (leg swelling, pain with palpation) = 0.0  -Other diagnosis less likely than pulmonary embolism =            0.0  -Heart Rate >100 =   1.5  -Immobilization (= or > than 3 days) or surgery in the previous 4 weeks = 0.0  -Previous DVT/PE = 0.0  -Hemoptysis =          0.0  -Malignancy =           0.0  Well's Probability Score =    1.5          61-year-old female presenting for evaluation of chest pain and shortness of breath has been going on since Wednesday of last week.  The patient's history is not consistent with a cardiac source of her pain given it is pleuritic, worsen with deep breathing and with touching of the chest wall.  Her vitals are completely normal there is no tachycardia although she had a slight elevation of her heart rate during the EKG which patient attributed to pain with lying down and position change  She has no hypoxia at rest or with exertion.  She has no leg swelling, no hemoptysis, no recent malignancy or immobilization.  Her exam is benign.  She has some  tenderness of the chest wall overlying the left pectoral muscle on the left breast which reproduces her chest pain.  No leg swelling or edema.  Abdominal exam is benign.  Given her recent viral upper respiratory illness in the weeks prior to presentation the longevity of her pain, history of her pain, physical exam findings normal EKG and normal chest x-ray I think it is highly likely this patient is having a musculoskeletal cause of her chest pain.  I considered for consider highly unlikely that the patient has an emergent medical condition such as but not limited to pulmonary embolism, ACS, pneumothorax, esophageal rupture, aortic dissection.  I will treat the patient's pain with Toradol and she was started Aleve twice daily for next 5 days.  She is instructed to make an appoint with her PCP this week for follow-up this problem.  ED precautions were extensively discussed.

## 2022-01-17 NOTE — PATIENT INSTRUCTIONS
You must understand that you have received treatment at an Urgent Care facility only, and that you may be  released before all of your medical problems are known or treated. Urgent Care facilities are not equipped to  handle life threatening emergencies. It is recommended that you seek care at an Emergency Department for  further evaluation of worsening or concerning symptoms, or possibly life threatening conditions as  Discussed.    Make appointment with PCP this week for follow up. If symptoms worsen, go to ER promptly. Start aleve twice daily for 5 days tomorrow.     Patient Education       Chest Pain That Is Not Caused by the Heart Discharge Instructions   About this topic   Chest pain is not always caused by heart disease. There are many other things that can cause it. Muscle strain, lung problems, acid reflux, swallowing tube irritation, and anxiety can all cause chest pain. Treatment will depend on the cause. Rest and pain drugs may be used to treat muscle strain. Changing your eating habits and using drugs that lower stomach acid may be used to treat stomach problems. Knowing the pain is not coming from your heart may help with anxiety. Sometimes, calming drugs are needed.  You may have had one or more of the following signs:  · Burning feeling in your throat, upper belly, or behind the chest bone  · Fast heartbeat  · Problems breathing like breathing fast, wheezing, coughing, feeling short of breath  · Feeling faint or sweating  · Anxiety  · Pain in your shoulder, upper back, chest wall, or muscles. This may be due to lifting a heavy object.  · Feeling of a lump or tightness of the food pipe in the chest  · Upset stomach, belly pain, or throwing up  What care is needed at home?   · Ask your doctor what you need to do when you go home. Make sure you ask questions if you do not understand what the doctor says. This way you will know what you need to do.  · If your doctor tells you to use heat, put a heating  pad on the painful part for no more than 20 minutes at a time. Never go to sleep with a heating pad on as this can cause burns.  · If the chest pain is caused by coughing, using a cool mist humidifier may help your breathing.  What follow-up care is needed?   · Your doctor may ask you to make visits to the office to check on your progress. Be sure to keep these visits. You may need to have more tests.  · Monitor the pain. Pay attention to:  ? If there is more chest pain or the chest pain worsens  ? How often you have chest pain and when it happens  ? What type of pain it is. Is it throbbing, stabbing, heavy, cramping, or related to moving or breathing?  ? If eating is related to the pain     What drugs may be needed?   The doctor may order drugs to:  · Help with pain  · Treat stomach problems  · Treat food pipe problems  · Help with breathing  · Help you relax  · Control coughing  Will physical activity be limited?   · If pain is caused by muscle strain, limit activities that can trigger your chest pain.  · As you feel better, you can slowly add to your normal activities. Talk to your doctor about the right amount of activity for you.  What changes to diet are needed?   If the pain is from your stomach or esophagus, your doctor may suggest you:  · Avoid foods that make signs worse.  · Avoid lying down after eating.  · Eat smaller meals.  · Decrease the amount of beer, wine, and mixed drinks (alcohol) you drink.  · Consider a plan to cut down and quit smoking.  · Lose weight if you are overweight.  What can be done to prevent this health problem?   · Take care with exercise and heavy activities. This is very important if the activities are much harder than what you are used to.  · Avoid lifting heavy objects. This can pull your chest muscles.  · Manage anxiety with breathing exercises.  When do I need to call the doctor?   Activate the emergency medical system right away if you have signs of a heart attack. Call 911  in the [place] or [place]. The sooner treatment begins, the better your chances for recovery. Call for emergency help right away if you have:  · Signs of heart attack:  ? Chest pain  ? Trouble breathing  ? Fast heartbeat  ? Feeling dizzy  Call your doctor if:  · The pain is not controlled or worsens  · You have trouble breathing  · You have fever, chills, or coughing up yellow-green mucus  · You have problems swallowing  · Your pain is due to stress and the stress cannot be relieved  · You have a new chest pain that feels different from what you have had before  Teach Back: Helping You Understand   The Teach Back Method helps you understand the information we are giving you. After you talk with the staff, tell them in your own words what you learned. This helps to make sure the staff has described each thing clearly. It also helps to explain things that may have been confusing. Before going home, make sure you can do these:  · I can tell you about my pain.  · I can tell you what may help ease my pain.  · I can tell you what I will do if I have signs of a heart attack.  · I can tell you what I will do if I have trouble breathing or problems swallowing.  Last Reviewed Date   2021-03-17  Consumer Information Use and Disclaimer   This information is not specific medical advice and does not replace information you receive from your health care provider. This is only a brief summary of general information. It does NOT include all information about conditions, illnesses, injuries, tests, procedures, treatments, therapies, discharge instructions or life-style choices that may apply to you. You must talk with your health care provider for complete information about your health and treatment options. This information should not be used to decide whether or not to accept your health care providers advice, instructions or recommendations. Only your health care provider has the knowledge and training to provide advice that  is right for you.  Copyright   Copyright © 2021 [employer] and its affiliates and/or licensors. All rights reserved.

## 2022-01-19 ENCOUNTER — TELEPHONE (OUTPATIENT)
Dept: URGENT CARE | Facility: CLINIC | Age: 62
End: 2022-01-19
Payer: COMMERCIAL

## 2022-06-28 ENCOUNTER — OFFICE VISIT (OUTPATIENT)
Dept: URGENT CARE | Facility: CLINIC | Age: 62
End: 2022-06-28
Payer: COMMERCIAL

## 2022-06-28 VITALS
HEIGHT: 65 IN | DIASTOLIC BLOOD PRESSURE: 81 MMHG | HEART RATE: 81 BPM | OXYGEN SATURATION: 98 % | WEIGHT: 110 LBS | SYSTOLIC BLOOD PRESSURE: 125 MMHG | BODY MASS INDEX: 18.33 KG/M2 | RESPIRATION RATE: 17 BRPM | TEMPERATURE: 98 F

## 2022-06-28 DIAGNOSIS — J06.9 VIRAL URI WITH COUGH: Primary | ICD-10-CM

## 2022-06-28 DIAGNOSIS — R68.83 CHILLS: ICD-10-CM

## 2022-06-28 LAB
CTP QC/QA: YES
SARS-COV-2 RDRP RESP QL NAA+PROBE: NEGATIVE

## 2022-06-28 PROCEDURE — 99213 PR OFFICE/OUTPT VISIT, EST, LEVL III, 20-29 MIN: ICD-10-PCS | Mod: S$GLB,,,

## 2022-06-28 PROCEDURE — 1159F MED LIST DOCD IN RCRD: CPT | Mod: CPTII,S$GLB,,

## 2022-06-28 PROCEDURE — 3079F DIAST BP 80-89 MM HG: CPT | Mod: CPTII,S$GLB,,

## 2022-06-28 PROCEDURE — 3079F PR MOST RECENT DIASTOLIC BLOOD PRESSURE 80-89 MM HG: ICD-10-PCS | Mod: CPTII,S$GLB,,

## 2022-06-28 PROCEDURE — 1159F PR MEDICATION LIST DOCUMENTED IN MEDICAL RECORD: ICD-10-PCS | Mod: CPTII,S$GLB,,

## 2022-06-28 PROCEDURE — 1160F PR REVIEW ALL MEDS BY PRESCRIBER/CLIN PHARMACIST DOCUMENTED: ICD-10-PCS | Mod: CPTII,S$GLB,,

## 2022-06-28 PROCEDURE — 1160F RVW MEDS BY RX/DR IN RCRD: CPT | Mod: CPTII,S$GLB,,

## 2022-06-28 PROCEDURE — 99213 OFFICE O/P EST LOW 20 MIN: CPT | Mod: S$GLB,,,

## 2022-06-28 PROCEDURE — U0002 COVID-19 LAB TEST NON-CDC: HCPCS | Mod: QW,S$GLB,,

## 2022-06-28 PROCEDURE — 3008F PR BODY MASS INDEX (BMI) DOCUMENTED: ICD-10-PCS | Mod: CPTII,S$GLB,,

## 2022-06-28 PROCEDURE — 3074F PR MOST RECENT SYSTOLIC BLOOD PRESSURE < 130 MM HG: ICD-10-PCS | Mod: CPTII,S$GLB,,

## 2022-06-28 PROCEDURE — 3008F BODY MASS INDEX DOCD: CPT | Mod: CPTII,S$GLB,,

## 2022-06-28 PROCEDURE — U0002: ICD-10-PCS | Mod: QW,S$GLB,,

## 2022-06-28 PROCEDURE — 3074F SYST BP LT 130 MM HG: CPT | Mod: CPTII,S$GLB,,

## 2022-06-28 NOTE — PATIENT INSTRUCTIONS
- Rest.    - Drink plenty of fluids.    - Acetaminophen (tylenol) or Ibuprofen (advil,motrin) as directed as needed for fever/pain. Avoid tylenol if you have a history of liver disease. Do not take ibuprofen if you have a history of GI bleeding, kidney disease, or if you take blood thinners.   - Ibuprofen dosing for adults: 400 mg by mouth every 4-6 hours as needed. Max: 2400 mg/day; Info: use lowest effective dose, shortest effective treatment duration; give w/ food if GI upset occurs.  - Ibuprofen dosing for children: [6 mo-12 yo] Dose: 5-10 mg/kg/dose by mouth every 6-8h as needed; Max: 40 mg/kg/day; Info: use lower dose for fever <102.5 F, higher dose for fever >102.5 F; use shortest effective tx duration; give w/ food if GI upset occurs. [13 yo and older] Dose: 200-400 mg by mouth every 4-6 hours as needed; Max: 1200 mg/day; Info: use lowest effective dose, shortest effective tx duration; give w/ food if GI upset occurs.  - Tylenol dosing for adults: [By mouth route, immediate-release form] Dose: 325-1000 mg by mouth every 4-6h as needed; Max: 1 g/4h and 4 g/day from all sources. [By mouth route, extended-release form] Dose: 650-1300 mg Extended Release by mouth every 8h as needed; Max: 4 g/day from all sources.   - Tylenol dosing for children: 6-12 yo [ oral tablet/capsule ] Dose: 1 tab/cap by mouth every 4-6h as needed; Max: 5 tabs or caps/24h; Info: do not exceed 75 mg/kg/day, up to 1 g/4h and 4 g/day, from all sources. 13 yo and older [ oral tablet/capsule ] Dose: 1-2 tabs/caps by mouth every 4-6h as needed; Max: 10 tabs or caps/24h; Info: do not exceed 1 g/4h and 4 g/day from all sources.    - You must understand that you have received an Urgent Care treatment only and that you may be released before all of your medical problems are known or treated.   - You, the patient, will arrange for follow up care as instructed.   - If your condition worsens or fails to improve we recommend that you receive another  evaluation at the ER immediately or contact your PCP to discuss your concerns or return here.   - Follow up with your PCP or specialty clinic as directed in the next 1-2 weeks if not improved or as needed.  You can call [phone] to schedule an appointment with the appropriate provider.    If your symptoms do not improve or worsen, go to the emergency room immediately.  - Rest.    - Drink plenty of fluids.  - Viral upper respiratory infections typically run their course in 10-14 days.      - You can take over-the-counter claritin, zyrtec, allegra, or xyzal as directed. These are antihistamines that can help with runny nose, nasal congestion, sneezing, and helps to dry up post-nasal drip, which usually causes sore throat and cough.               - If you do NOT have high blood pressure, you may use a decongestant form (D)  of this medication (ie. Claritin- D, zyrtec-D, allegra-D) or if you do not take the D form, you can take sudafed (pseudoephedrine) over the counter, which is a decongestant. Do NOT take two decongestant (D) medications at the same time (such as mucinex-D and claritin-D or plain sudafed and claritin D). Dextromethorphan (DM) is a cough suppressant over the counter (ie. mucinex DM, robitussin, delsym; dayquil/nyquil has DM as well.)     - You can use Flonase (fluticasone) nasal spray as directed for sinus congestion and postnasal drip. This is a steroid nasal spray that works locally over time to decrease the inflammation in your nose/sinuses and help with allergic symptoms. This is not an quick- relief spray like afrin, but it works well if used daily.  Discontinue if you develop nose bleed  - Use nasal saline prior to Flonase.  - Use Ocean Spray Nasal Saline 1-3 puffs each nostril every 2-3 hours then blow out onto tissue. This is to irrigate the nasal passage way to clear the sinus openings. Use until sinus problem resolved.     - You can take plain Mucinex (guaifenesin) 1200 mg twice a day  to help loosen mucous.      - Warm salt water gargles can help with sore throat     - Warm tea with honey can help with cough. Honey is a natural cough suppressant.           - Follow up with your PCP or specialty clinic as directed in the next 1-2 weeks if not improved or as needed.  You can call [phone] to schedule an appointment with the appropriate provider.

## 2022-06-28 NOTE — PROGRESS NOTES
"Subjective:       Patient ID: Eden Kuhn is a 61 y.o. female.    Vitals:  height is 5' 5" (1.651 m) and weight is 49.9 kg (110 lb). Her tympanic temperature is 97.7 °F (36.5 °C). Her blood pressure is 125/81 and her pulse is 81. Her respiration is 17 and oxygen saturation is 98%.     Chief Complaint: Fatigue    61 y.o female presents today c/o fatigue, chills, headaches, and body aches that started yesterday.  Patient is vaccinated for Covid 19 and reports exposure to Covid 19. Exposed to a  that she was around for a few seconds yesterday. Patient is a chronic smoker.   Patient denies chest pain and SOB.      Fatigue  This is a new problem. The current episode started yesterday. Associated symptoms include chills, congestion, fatigue and headaches. Pertinent negatives include no chest pain, coughing, fever, sore throat, vomiting or weakness. Nothing aggravates the symptoms. Treatments tried: Ibuprofen.       Constitution: Positive for chills and fatigue. Negative for fever.   HENT: Positive for congestion. Negative for sore throat.    Cardiovascular: Negative for chest pain.   Eyes: Negative for eye pain and eye redness.   Respiratory: Negative for cough, sputum production and shortness of breath.    Gastrointestinal: Negative for vomiting.   Neurological: Positive for headaches. Negative for disorientation and altered mental status.   Psychiatric/Behavioral: Negative for altered mental status, disorientation and confusion.       Objective:      Physical Exam   Constitutional: She is oriented to person, place, and time. She appears well-developed. She is cooperative.  Non-toxic appearance. She does not appear ill. No distress.      Comments:Patient sits comfortably in exam chair. Answers questions in complete sentences. Does not show any signs of distress or discoloration.        HENT:   Head: Normocephalic and atraumatic.   Ears:   Right Ear: Hearing, tympanic membrane, external ear and ear canal " normal.   Left Ear: Hearing, tympanic membrane, external ear and ear canal normal.   Nose: Congestion present. No mucosal edema, rhinorrhea or nasal deformity. No epistaxis. Right sinus exhibits no maxillary sinus tenderness and no frontal sinus tenderness. Left sinus exhibits no maxillary sinus tenderness and no frontal sinus tenderness.   Mouth/Throat: Uvula is midline, oropharynx is clear and moist and mucous membranes are normal. No trismus in the jaw. Normal dentition. No uvula swelling. No oropharyngeal exudate, posterior oropharyngeal edema or posterior oropharyngeal erythema.   Eyes: Conjunctivae and lids are normal. No scleral icterus.   Neck: Trachea normal and phonation normal. Neck supple. No edema present. No erythema present. No neck rigidity present.   Cardiovascular: Normal rate, regular rhythm, normal heart sounds and normal pulses.   Pulmonary/Chest: Effort normal. No respiratory distress. She has no decreased breath sounds. She has wheezes (diffuse). She has no rhonchi.   Abdominal: Normal appearance.   Musculoskeletal: Normal range of motion.         General: No deformity. Normal range of motion.   Lymphadenopathy:     She has no cervical adenopathy.        Right cervical: No superficial cervical, no deep cervical and no posterior cervical adenopathy present.       Left cervical: No superficial cervical, no deep cervical and no posterior cervical adenopathy present.   Neurological: She is alert and oriented to person, place, and time. She exhibits normal muscle tone. Coordination normal.   Skin: Skin is warm, dry, intact, not diaphoretic and not pale.   Psychiatric: Her speech is normal and behavior is normal. Judgment and thought content normal.   Nursing note and vitals reviewed.        Results for orders placed or performed in visit on 06/28/22   POCT COVID-19 Rapid Screening   Result Value Ref Range    POC Rapid COVID Negative Negative     Acceptable Yes      1    Assessment:        1. Chills          Plan:         Chills  -     POCT COVID-19 Rapid Screening               Patient Instructions   - Rest.    - Drink plenty of fluids.    - Acetaminophen (tylenol) or Ibuprofen (advil,motrin) as directed as needed for fever/pain. Avoid tylenol if you have a history of liver disease. Do not take ibuprofen if you have a history of GI bleeding, kidney disease, or if you take blood thinners.   - Ibuprofen dosing for adults: 400 mg by mouth every 4-6 hours as needed. Max: 2400 mg/day; Info: use lowest effective dose, shortest effective treatment duration; give w/ food if GI upset occurs.  - Ibuprofen dosing for children: [6 mo-12 yo] Dose: 5-10 mg/kg/dose by mouth every 6-8h as needed; Max: 40 mg/kg/day; Info: use lower dose for fever <102.5 F, higher dose for fever >102.5 F; use shortest effective tx duration; give w/ food if GI upset occurs. [13 yo and older] Dose: 200-400 mg by mouth every 4-6 hours as needed; Max: 1200 mg/day; Info: use lowest effective dose, shortest effective tx duration; give w/ food if GI upset occurs.  - Tylenol dosing for adults: [By mouth route, immediate-release form] Dose: 325-1000 mg by mouth every 4-6h as needed; Max: 1 g/4h and 4 g/day from all sources. [By mouth route, extended-release form] Dose: 650-1300 mg Extended Release by mouth every 8h as needed; Max: 4 g/day from all sources.   - Tylenol dosing for children: 6-12 yo [ oral tablet/capsule ] Dose: 1 tab/cap by mouth every 4-6h as needed; Max: 5 tabs or caps/24h; Info: do not exceed 75 mg/kg/day, up to 1 g/4h and 4 g/day, from all sources. 13 yo and older [ oral tablet/capsule ] Dose: 1-2 tabs/caps by mouth every 4-6h as needed; Max: 10 tabs or caps/24h; Info: do not exceed 1 g/4h and 4 g/day from all sources.    - You must understand that you have received an Urgent Care treatment only and that you may be released before all of your medical problems are known or treated.   - You, the patient, will arrange for  follow up care as instructed.   - If your condition worsens or fails to improve we recommend that you receive another evaluation at the ER immediately or contact your PCP to discuss your concerns or return here.   - Follow up with your PCP or specialty clinic as directed in the next 1-2 weeks if not improved or as needed.  You can call [phone] to schedule an appointment with the appropriate provider.    If your symptoms do not improve or worsen, go to the emergency room immediately.  - Rest.    - Drink plenty of fluids.  - Viral upper respiratory infections typically run their course in 10-14 days.      - You can take over-the-counter claritin, zyrtec, allegra, or xyzal as directed. These are antihistamines that can help with runny nose, nasal congestion, sneezing, and helps to dry up post-nasal drip, which usually causes sore throat and cough.               - If you do NOT have high blood pressure, you may use a decongestant form (D)  of this medication (ie. Claritin- D, zyrtec-D, allegra-D) or if you do not take the D form, you can take sudafed (pseudoephedrine) over the counter, which is a decongestant. Do NOT take two decongestant (D) medications at the same time (such as mucinex-D and claritin-D or plain sudafed and claritin D). Dextromethorphan (DM) is a cough suppressant over the counter (ie. mucinex DM, robitussin, delsym; dayquil/nyquil has DM as well.)     - You can use Flonase (fluticasone) nasal spray as directed for sinus congestion and postnasal drip. This is a steroid nasal spray that works locally over time to decrease the inflammation in your nose/sinuses and help with allergic symptoms. This is not an quick- relief spray like afrin, but it works well if used daily.  Discontinue if you develop nose bleed  - Use nasal saline prior to Flonase.  - Use Ocean Spray Nasal Saline 1-3 puffs each nostril every 2-3 hours then blow out onto tissue. This is to irrigate the nasal passage way to clear the  sinus openings. Use until sinus problem resolved.     - You can take plain Mucinex (guaifenesin) 1200 mg twice a day to help loosen mucous.      - Warm salt water gargles can help with sore throat     - Warm tea with honey can help with cough. Honey is a natural cough suppressant.           - Follow up with your PCP or specialty clinic as directed in the next 1-2 weeks if not improved or as needed.  You can call [phone] to schedule an appointment with the appropriate provider.

## 2023-10-01 ENCOUNTER — HOSPITAL ENCOUNTER (EMERGENCY)
Facility: HOSPITAL | Age: 63
Discharge: HOME | End: 2023-10-02
Attending: EMERGENCY MEDICINE
Payer: COMMERCIAL

## 2023-10-01 DIAGNOSIS — S01.81XA FACIAL LACERATION, INITIAL ENCOUNTER: ICD-10-CM
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
## 2023-10-01 DIAGNOSIS — S42.031A CLOSED DISPLACED FRACTURE OF ACROMIAL END OF RIGHT CLAVICLE, INITIAL ENCOUNTER: Primary | ICD-10-CM

## 2023-10-01 LAB
BASOPHILS # BLD AUTO: 0.08 K/UL (ref 0–0.2)
BASOPHILS NFR BLD: 0.5 % (ref 0–1.9)
DIFFERENTIAL METHOD: ABNORMAL
EOSINOPHIL # BLD AUTO: 0.1 K/UL (ref 0–0.5)
EOSINOPHIL NFR BLD: 0.8 % (ref 0–8)
ERYTHROCYTE [DISTWIDTH] IN BLOOD BY AUTOMATED COUNT: 13.4 % (ref 11.5–14.5)
HCT VFR BLD AUTO: 38.7 % (ref 37–48.5)
HGB BLD-MCNC: 13.3 G/DL (ref 12–16)
IMM GRANULOCYTES # BLD AUTO: 0.07 K/UL (ref 0–0.04)
IMM GRANULOCYTES NFR BLD AUTO: 0.4 % (ref 0–0.5)
LYMPHOCYTES # BLD AUTO: 2.1 K/UL (ref 1–4.8)
LYMPHOCYTES NFR BLD: 12.8 % (ref 18–48)
MCH RBC QN AUTO: 35.2 PG (ref 27–31)
MCHC RBC AUTO-ENTMCNC: 34.4 G/DL (ref 32–36)
MCV RBC AUTO: 102 FL (ref 82–98)
MONOCYTES # BLD AUTO: 1.2 K/UL (ref 0.3–1)
MONOCYTES NFR BLD: 7.3 % (ref 4–15)
NEUTROPHILS # BLD AUTO: 12.5 K/UL (ref 1.8–7.7)
NEUTROPHILS NFR BLD: 78.2 % (ref 38–73)
NRBC BLD-RTO: 0 /100 WBC
PLATELET # BLD AUTO: 373 K/UL (ref 150–450)
PMV BLD AUTO: 9.5 FL (ref 9.2–12.9)
RBC # BLD AUTO: 3.78 M/UL (ref 4–5.4)
WBC # BLD AUTO: 15.97 K/UL (ref 3.9–12.7)

## 2023-10-01 PROCEDURE — 84484 ASSAY OF TROPONIN QUANT: CPT

## 2023-10-01 PROCEDURE — 96375 TX/PRO/DX INJ NEW DRUG ADDON: CPT | Mod: 59

## 2023-10-01 PROCEDURE — 99285 EMERGENCY DEPT VISIT HI MDM: CPT | Mod: 25

## 2023-10-01 PROCEDURE — 12001 RPR S/N/AX/GEN/TRNK 2.5CM/<: CPT

## 2023-10-01 PROCEDURE — 83880 ASSAY OF NATRIURETIC PEPTIDE: CPT

## 2023-10-01 PROCEDURE — 63600175 PHARM REV CODE 636 W HCPCS

## 2023-10-01 PROCEDURE — 93010 EKG 12-LEAD: ICD-10-PCS | Mod: ,,, | Performed by: INTERNAL MEDICINE

## 2023-10-01 PROCEDURE — 96361 HYDRATE IV INFUSION ADD-ON: CPT | Mod: 59

## 2023-10-01 PROCEDURE — 93010 ELECTROCARDIOGRAM REPORT: CPT | Mod: ,,, | Performed by: INTERNAL MEDICINE

## 2023-10-01 PROCEDURE — 25000003 PHARM REV CODE 250

## 2023-10-01 PROCEDURE — 96374 THER/PROPH/DIAG INJ IV PUSH: CPT | Mod: 59

## 2023-10-01 PROCEDURE — 85025 COMPLETE CBC W/AUTO DIFF WBC: CPT

## 2023-10-01 PROCEDURE — 93005 ELECTROCARDIOGRAM TRACING: CPT

## 2023-10-01 PROCEDURE — 84443 ASSAY THYROID STIM HORMONE: CPT

## 2023-10-01 PROCEDURE — 87389 HIV-1 AG W/HIV-1&-2 AB AG IA: CPT | Performed by: PHYSICIAN ASSISTANT

## 2023-10-01 PROCEDURE — 86803 HEPATITIS C AB TEST: CPT | Performed by: PHYSICIAN ASSISTANT

## 2023-10-01 PROCEDURE — 96376 TX/PRO/DX INJ SAME DRUG ADON: CPT | Mod: 59

## 2023-10-01 RX ORDER — HYDROMORPHONE HYDROCHLORIDE 1 MG/ML
1 INJECTION, SOLUTION INTRAMUSCULAR; INTRAVENOUS; SUBCUTANEOUS
Status: COMPLETED | OUTPATIENT
Start: 2023-10-01 | End: 2023-10-01

## 2023-10-01 RX ADMIN — HYDROMORPHONE HYDROCHLORIDE 1 MG: 1 INJECTION, SOLUTION INTRAMUSCULAR; INTRAVENOUS; SUBCUTANEOUS at 11:10

## 2023-10-01 RX ADMIN — SODIUM CHLORIDE 1000 ML: 9 INJECTION, SOLUTION INTRAVENOUS at 11:10

## 2023-10-02 ENCOUNTER — PATIENT MESSAGE (OUTPATIENT)
Dept: ORTHOPEDICS | Facility: CLINIC | Age: 63
End: 2023-10-02
Payer: COMMERCIAL

## 2023-10-02 ENCOUNTER — TELEPHONE (OUTPATIENT)
Dept: ORTHOPEDICS | Facility: CLINIC | Age: 63
End: 2023-10-02
Payer: COMMERCIAL

## 2023-10-02 VITALS
HEART RATE: 77 BPM | TEMPERATURE: 98 F | WEIGHT: 110 LBS | BODY MASS INDEX: 18.33 KG/M2 | DIASTOLIC BLOOD PRESSURE: 83 MMHG | HEIGHT: 65 IN | OXYGEN SATURATION: 97 % | SYSTOLIC BLOOD PRESSURE: 148 MMHG | RESPIRATION RATE: 18 BRPM

## 2023-10-02 LAB
ALBUMIN SERPL BCP-MCNC: 4.2 G/DL (ref 3.5–5.2)
ALP SERPL-CCNC: 35 U/L (ref 55–135)
ALT SERPL W/O P-5'-P-CCNC: 25 U/L (ref 10–44)
ANION GAP SERPL CALC-SCNC: 14 MMOL/L (ref 8–16)
AST SERPL-CCNC: 34 U/L (ref 10–40)
BACTERIA #/AREA URNS AUTO: NORMAL /HPF
BILIRUB SERPL-MCNC: 0.7 MG/DL (ref 0.1–1)
BILIRUB UR QL STRIP: NEGATIVE
BNP SERPL-MCNC: 18 PG/ML (ref 0–99)
BUN SERPL-MCNC: 5 MG/DL (ref 6–30)
BUN SERPL-MCNC: 5 MG/DL (ref 6–30)
BUN SERPL-MCNC: 5 MG/DL (ref 8–23)
CALCIUM SERPL-MCNC: 9.1 MG/DL (ref 8.7–10.5)
CHLORIDE SERPL-SCNC: 102 MMOL/L (ref 95–110)
CHLORIDE SERPL-SCNC: 103 MMOL/L (ref 95–110)
CHLORIDE SERPL-SCNC: 104 MMOL/L (ref 95–110)
CLARITY UR REFRACT.AUTO: ABNORMAL
CO2 SERPL-SCNC: 18 MMOL/L (ref 23–29)
COLOR UR AUTO: YELLOW
CREAT SERPL-MCNC: 0.4 MG/DL (ref 0.5–1.4)
CREAT SERPL-MCNC: 0.5 MG/DL (ref 0.5–1.4)
CREAT SERPL-MCNC: 0.6 MG/DL (ref 0.5–1.4)
EST. GFR  (NO RACE VARIABLE): >60 ML/MIN/1.73 M^2
GLUCOSE SERPL-MCNC: 61 MG/DL (ref 70–110)
GLUCOSE SERPL-MCNC: 69 MG/DL (ref 70–110)
GLUCOSE SERPL-MCNC: 72 MG/DL (ref 70–110)
GLUCOSE UR QL STRIP: NEGATIVE
HCT VFR BLD CALC: 40 %PCV (ref 36–54)
HCT VFR BLD CALC: 40 %PCV (ref 36–54)
HCV AB SERPL QL IA: NORMAL
HGB UR QL STRIP: ABNORMAL
HIV 1+2 AB+HIV1 P24 AG SERPL QL IA: NORMAL
KETONES UR QL STRIP: NEGATIVE
LEUKOCYTE ESTERASE UR QL STRIP: ABNORMAL
MICROSCOPIC COMMENT: NORMAL
NITRITE UR QL STRIP: NEGATIVE
PH UR STRIP: 6 [PH] (ref 5–8)
POC IONIZED CALCIUM: 0.95 MMOL/L (ref 1.06–1.42)
POC IONIZED CALCIUM: 1.06 MMOL/L (ref 1.06–1.42)
POC TCO2 (MEASURED): 20 MMOL/L (ref 23–29)
POC TCO2 (MEASURED): 21 MMOL/L (ref 23–29)
POTASSIUM BLD-SCNC: 4.4 MMOL/L (ref 3.5–5.1)
POTASSIUM BLD-SCNC: 7.3 MMOL/L (ref 3.5–5.1)
POTASSIUM SERPL-SCNC: 4.4 MMOL/L (ref 3.5–5.1)
PROT SERPL-MCNC: 7.5 G/DL (ref 6–8.4)
PROT UR QL STRIP: NEGATIVE
RBC #/AREA URNS AUTO: 2 /HPF (ref 0–4)
SAMPLE: ABNORMAL
SAMPLE: ABNORMAL
SODIUM BLD-SCNC: 132 MMOL/L (ref 136–145)
SODIUM BLD-SCNC: 135 MMOL/L (ref 136–145)
SODIUM SERPL-SCNC: 136 MMOL/L (ref 136–145)
SP GR UR STRIP: 1 (ref 1–1.03)
SQUAMOUS #/AREA URNS AUTO: 2 /HPF
TROPONIN I SERPL DL<=0.01 NG/ML-MCNC: <0.006 NG/ML (ref 0–0.03)
TSH SERPL DL<=0.005 MIU/L-ACNC: 2.42 UIU/ML (ref 0.4–4)
URN SPEC COLLECT METH UR: ABNORMAL
WBC #/AREA URNS AUTO: 3 /HPF (ref 0–5)

## 2023-10-02 PROCEDURE — 90471 IMMUNIZATION ADMIN: CPT

## 2023-10-02 PROCEDURE — 90715 TDAP VACCINE 7 YRS/> IM: CPT

## 2023-10-02 PROCEDURE — 63600175 PHARM REV CODE 636 W HCPCS: Performed by: EMERGENCY MEDICINE

## 2023-10-02 PROCEDURE — 12001 RPR S/N/AX/GEN/TRNK 2.5CM/<: CPT

## 2023-10-02 PROCEDURE — 63600175 PHARM REV CODE 636 W HCPCS

## 2023-10-02 PROCEDURE — 80053 COMPREHEN METABOLIC PANEL: CPT | Performed by: EMERGENCY MEDICINE

## 2023-10-02 PROCEDURE — 25000003 PHARM REV CODE 250

## 2023-10-02 PROCEDURE — 81001 URINALYSIS AUTO W/SCOPE: CPT

## 2023-10-02 RX ORDER — HYDROMORPHONE HYDROCHLORIDE 1 MG/ML
1 INJECTION, SOLUTION INTRAMUSCULAR; INTRAVENOUS; SUBCUTANEOUS
Status: COMPLETED | OUTPATIENT
Start: 2023-10-02 | End: 2023-10-02

## 2023-10-02 RX ORDER — HYDROMORPHONE HYDROCHLORIDE 1 MG/ML
0.5 INJECTION, SOLUTION INTRAMUSCULAR; INTRAVENOUS; SUBCUTANEOUS
Status: COMPLETED | OUTPATIENT
Start: 2023-10-02 | End: 2023-10-02

## 2023-10-02 RX ORDER — DROPERIDOL 2.5 MG/ML
1.25 INJECTION, SOLUTION INTRAMUSCULAR; INTRAVENOUS
Status: COMPLETED | OUTPATIENT
Start: 2023-10-02 | End: 2023-10-02

## 2023-10-02 RX ORDER — LIDOCAINE HYDROCHLORIDE 10 MG/ML
5 INJECTION, SOLUTION EPIDURAL; INFILTRATION; INTRACAUDAL; PERINEURAL
Status: COMPLETED | OUTPATIENT
Start: 2023-10-02 | End: 2023-10-02

## 2023-10-02 RX ORDER — OXYCODONE HYDROCHLORIDE 5 MG/1
5 TABLET ORAL EVERY 4 HOURS PRN
Qty: 10 TABLET | Refills: 0 | Status: SHIPPED | OUTPATIENT
Start: 2023-10-02 | End: 2023-10-03

## 2023-10-02 RX ORDER — HYDROMORPHONE HYDROCHLORIDE 1 MG/ML
1 INJECTION, SOLUTION INTRAMUSCULAR; INTRAVENOUS; SUBCUTANEOUS
Status: DISCONTINUED | OUTPATIENT
Start: 2023-10-02 | End: 2023-10-02

## 2023-10-02 RX ADMIN — HYDROMORPHONE HYDROCHLORIDE 0.5 MG: 1 INJECTION, SOLUTION INTRAMUSCULAR; INTRAVENOUS; SUBCUTANEOUS at 12:10

## 2023-10-02 RX ADMIN — LIDOCAINE HYDROCHLORIDE 50 MG: 10 SOLUTION INTRAVENOUS at 04:10

## 2023-10-02 RX ADMIN — DROPERIDOL 1.25 MG: 2.5 INJECTION, SOLUTION INTRAMUSCULAR; INTRAVENOUS at 02:10

## 2023-10-02 RX ADMIN — HYDROMORPHONE HYDROCHLORIDE 1 MG: 1 INJECTION, SOLUTION INTRAMUSCULAR; INTRAVENOUS; SUBCUTANEOUS at 04:10

## 2023-10-02 RX ADMIN — HYDROMORPHONE HYDROCHLORIDE 0.5 MG: 1 INJECTION, SOLUTION INTRAMUSCULAR; INTRAVENOUS; SUBCUTANEOUS at 02:10

## 2023-10-02 RX ADMIN — TETANUS TOXOID, REDUCED DIPHTHERIA TOXOID AND ACELLULAR PERTUSSIS VACCINE, ADSORBED 0.5 ML: 5; 2.5; 8; 8; 2.5 SUSPENSION INTRAMUSCULAR at 04:10

## 2023-10-02 NOTE — TELEPHONE ENCOUNTER
BENITO plan: 
 
-RUR 15% - Disposition - [facility] at [address] [phone] 
-[facility] is requiring 2 consecutive negative Covid test - first test is still pending - Referral placed to [facility] ph# [phone] 
-[facility] BLS transport upon discharge CM awaiting two negative Covid tests. Attending MD made aware of need for signatures/date on detention paperwork and info left on bedside chart. Once completed CM will fax to Mr Rochelle Barba at [phone] along with current MD/therapy notes. CM spoke with Mr. Bhavani Hollins this am to provide updates. Spoke with [facility] at [facility] who will reach out to [facility] [phone].  DEDE Serrato 
 
 Called 4 times in regards to scheduling appt. No answwr left detailed vm and email.

## 2023-10-02 NOTE — ED PROVIDER NOTES
"Encounter Date: 10/1/2023       History     Chief Complaint   Patient presents with    Fall     BIB ems for "fall down 3 steps" bruising to head and +loc.     63-year-old female with past medical history of thyroid disease, insomnia, anxiety, HLD, chronic pain secondary to arthritis (on Norco) now presenting with chief complaint of fall.  Patient is accompanied by her  who reports that patient ran out of Norco about one-week ago since been experiencing worsening pain and generalized feelings of fatigue. Today while walking up her patio stairs she fell down 5 steps landing on her right shoulder.  Patient's  found her unconscious at the bottom of the stairs minutes after she fell and she slowly was oriented but had a laceration to the right side of her face with active bleeding.  Upon arrival to the emergency department the patient all her by EMS, patient is awake and alert and reports headache and severe right shoulder pain.  He reports feeling short of breath today but denies any denies chest pain, all, or dizziness.    The history is provided by the patient and a relative.     Review of patient's allergies indicates:   Allergen Reactions    Ciprocinonide Anaphylaxis     Past Medical History:   Diagnosis Date    Anxiety     Arthritis     Insomnia     Thyroid disease     hypothyroid     Past Surgical History:   Procedure Laterality Date    ANAL FISTULOTOMY  01/21/2002    bladder repair      CYSTOCELE REPAIR  06/06/2011    ESOPHAGOGASTRODUODENOSCOPY N/A 8/3/2018    Procedure: EGD (ESOPHAGOGASTRODUODENOSCOPY);  Surgeon: Adam Olivera MD;  Location: [address]);  Service: Endoscopy;  Laterality: N/A;  Patient is VIP patient.  Plan to do during the week of July 16-20.  May cancel as it gets closer if she improves, but want to have it on the books.    Patient requested this date due to insurance.    EYE SURGERY  02/23/2012    Bilateral upper lid blepharoplasty with levator fixation    HYSTERECTOMY "       Family History   Problem Relation Age of Onset    Diabetes Mother     Thyroid disease Mother     Breast cancer Neg Hx     Ovarian cancer Neg Hx     Amblyopia Neg Hx     Blindness Neg Hx     Cancer Neg Hx     Cataracts Neg Hx     Glaucoma Neg Hx     Hypertension Neg Hx     Macular degeneration Neg Hx     Retinal detachment Neg Hx     Strabismus Neg Hx     Stroke Neg Hx      Social History     Tobacco Use    Smoking status: Every Day     Current packs/day: 0.50     Average packs/day: 0.5 packs/day for 13.0 years (6.5 ttl pk-yrs)     Types: Cigarettes    Smokeless tobacco: Never   Substance Use Topics    Alcohol use: Yes     Alcohol/week: 2.0 - 3.0 standard drinks of alcohol     Types: 2 - 3 Cans of beer per week    Drug use: No     Review of Systems  See HPI    Physical Exam     Initial Vitals [10/01/23 2253]   BP Pulse Resp Temp SpO2   (!) 160/100 101 16 98.4 °F (36.9 °C) 99 %      MAP       --         Physical Exam    Nursing note and vitals reviewed.      ABC intact  Disability: Awake, alert, oriented, MACRINA, moving all extremities  Secondary survey:  Patient exposed with laceration to right temporal region    No observed deformities of spine, no step-offs or crepitus.    Trauma survey negative for tenderness to palpation over skull, spine, ribcage, all extremities  - Tenderness to palpation of right shoulder     Gen: AxOx3, well nourished, appears stated age, no pallor, no jaundice, appears well hydrated  Eye: EOMI, no scleral icterus, no periorbital edema or ecchymosis  Head: Normocephalic, 1cm laceration with dry blood surrounding, no lesions, scalp appears normal  ENT: Neck supple, no stridor, no masses, no drooling or voice changes  CVS: All distal pulses intact with normal rate and rhythm, no JVD, normal S1/S2, no murmur  Pulm: Normal breath sounds, no wheezes, rales or rhonchi, no increased work of breathing  Abd:  Nondistended, soft, nontender, no organomegaly, no CVAT  Ext: RUE  - Skin intact  throughout, no scars present  - Small swelling over distal clavicle  - No ecchymosis, erythema, or signs of cellulitis  - Tenderness to palpation over shoulder (distal clavicle)   - No tenderness to palpation of elbow/ wrist  - No tenderness to palpation of proximal/ middle/ distal humerus  - No tenderness to palpation of proximal/ middle/ distal ulna or radius  - No tenderness to palpation of anatomical snuffbox  - No tenderness to palpation of hand or fingers  - Radial pulse 2+ & CRT <2  - Sensation intact over median/radial/ulnar distribution  - Pain with any ROM at shoulder  - Full painless ROM of elbow, and wrist  - Full painless ROM at all MCP/ PIP/ IP/ DIP joints with preserved strength  Neuro: GCS15, moving all extremities, gait intact, face grossly symmetric  Psych: normal affect, cooperative, well groomed, makes good eye contact      ED Course   Lac Repair    Date/Time: 10/2/2023 7:43 AM    Performed by: Kamala Wright MD  Authorized by: Angel Plaza MD    Consent:     Consent obtained:  Verbal    Consent given by:  Patient    Risks discussed:  Infection and poor cosmetic result  Universal protocol:     Procedure explained and questions answered to patient or proxy's satisfaction: yes      Patient identity confirmed:  Verbally with patient  Anesthesia:     Anesthesia method:  Local infiltration    Local anesthetic:  Lidocaine 1% w/o epi  Laceration details:     Location:  Scalp    Scalp location:  R temporal    Length (cm):  1    Depth (mm):  3  Pre-procedure details:     Preparation:  Patient was prepped and draped in usual sterile fashion  Exploration:     Limited defect created (wound extended): no      Hemostasis achieved with:  Direct pressure    Wound exploration: wound explored through full range of motion      Wound extent: no muscle damage noted, no underlying fracture noted and no vascular damage noted      Contaminated: no    Treatment:     Area cleansed with:  Saline    Amount of  cleaning:  Standard    Irrigation solution:  Sterile saline    Irrigation volume:  500    Irrigation method:  Syringe    Visualized foreign bodies/material removed: no      Debridement:  None  Skin repair:     Repair method:  Sutures    Suture size:  5-0    Suture material:  Prolene    Suture technique:  Simple interrupted    Number of sutures:  3  Approximation:     Approximation:  Close  Repair type:     Repair type:  Simple  Post-procedure details:     Dressing:  Open (no dressing)    Procedure completion:  Tolerated    Labs Reviewed   CBC W/ AUTO DIFFERENTIAL - Abnormal; Notable for the following components:       Result Value    WBC 15.97 (*)     RBC 3.78 (*)      (*)     MCH 35.2 (*)     Gran # (ANC) 12.5 (*)     Immature Grans (Abs) 0.07 (*)     Mono # 1.2 (*)     Gran % 78.2 (*)     Lymph % 12.8 (*)     All other components within normal limits   URINALYSIS, REFLEX TO URINE CULTURE - Abnormal; Notable for the following components:    Appearance, UA Hazy (*)     Occult Blood UA 2+ (*)     Leukocytes, UA 2+ (*)     All other components within normal limits    Narrative:     Specimen Source->Urine   COMPREHENSIVE METABOLIC PANEL - Abnormal; Notable for the following components:    CO2 18 (*)     Glucose 61 (*)     BUN 5 (*)     Alkaline Phosphatase 35 (*)     All other components within normal limits   ISTAT PROCEDURE - Abnormal; Notable for the following components:    POC Glucose 69 (*)     POC BUN 5 (*)     POC Sodium 132 (*)     POC Potassium 7.3 (*)     POC TCO2 (MEASURED) 20 (*)     POC Ionized Calcium 0.95 (*)     All other components within normal limits   ISTAT PROCEDURE - Abnormal; Notable for the following components:    POC BUN 5 (*)     POC Creatinine 0.4 (*)     POC Sodium 135 (*)     POC TCO2 (MEASURED) 21 (*)     All other components within normal limits   HIV 1 / 2 ANTIBODY    Narrative:     Release to patient->Immediate   HEPATITIS C ANTIBODY    Narrative:     Release to  patient->Immediate   B-TYPE NATRIURETIC PEPTIDE    Narrative:     ADD ON TSH PER DR TOMAS ZARAGOZA/ORDER# [id number] @ 00:19   TROPONIN I   TSH   TSH    Narrative:     ADD ON TSH PER DR TOMAS ZARAGOZA/ORDER# [id number] @ 00:19   URINALYSIS MICROSCOPIC    Narrative:     Specimen Source->Urine   COMPREHENSIVE METABOLIC PANEL     EKG Readings: (Independently Interpreted)   Sinus tachycardia with rate 110 no ST depressions or elevations.       Imaging Results               CT Maxillofacial Without Contrast (Final result)  Result time 10/02/23 02:50:53      Final result by Diego Palma MD (10/02/23 02:50:53)                   Impression:      HEAD CT:    Right frontal extra cranial soft tissue swelling and hematoma, with a gas-containing wound.    No underlying radiopaque foreign body, depressed calvarial fracture or acute intracranial abnormality.    MAXILLOFACIAL CT:    Right preseptal periorbital soft tissue swelling.    No acute fracture deformity.    CERVICAL SPINE CT:    T3 compression fracture, age indeterminate.    Diffuse osteopenia.    Multilevel degenerative changes, mainly caused neural foraminal stenosis.    No acute fracture deformity or acute traumatic malalignment in the cervical spine.    Small, hypoattenuating thyroid gland.  Correlate clinically for possible diminished thyroid function.    Biapical pulmonary opacities most likely represent chronic fibronodular changes but there are no corresponding studies with which to effectively compare.  Correlate clinically.  Consider outpatient chest CT in 3-6 months to further characterize this and help further exclude neoplasm.    This report was flagged in Epic as abnormal.      Electronically signed by: Diego Palma  Date:    10/02/2023  Time:    02:50               Narrative:    EXAMINATION:  CT HEAD WITHOUT CONTRAST; CT MAXILLOFACIAL WITHOUT CONTRAST; CT CERVICAL SPINE WITHOUT CONTRAST    CLINICAL HISTORY:  Head trauma, moderate-severe;; Facial  trauma, blunt;; Neck trauma, impaired ROM (Age 16-64y);    TECHNIQUE:  Low dose axial images were obtained through the head.  Coronal and sagittal reformations were also performed. Contrast was not administered.    Low dose axial images were obtained through the maxillofacial bone.  Coronal and sagittal reformations were also performed. Contrast was not administered.    Low dose axial images were obtained through the cervical spine.  Coronal and sagittal reformations were also performed. Contrast was not administered.    COMPARISON:  Noncontrast head CT 01/09/2022.    Thoracic spine radiographs 08/31/2004 (including a swimmer's view which includes portions of the cervical spine).    FINDINGS:  Artifacts related to beam hardening and/or motion degrade portions of the scans.  Allowing for the artifacts, the findings are as follows...    Head CT:    Right frontal extra cranial soft tissue swelling and hematoma, with a gas-containing wound.  The extracranial soft tissue swelling extends caudally into the right preseptal periorbital soft tissues.    No depressed calvarial fracture, acute intracranial hemorrhage, hydrocephalus, pathologic extra-axial fluid collection, discrete intracranial mass or mass effect, midline shift, brain herniation, brain edema, or discrete acute large vascular territory brain infarct.    Partial empty sella.    There remains partial opacification of the right mastoid air cells, perhaps minimally increased from 01/09/2022.    Maxillofacial CT:    Right preseptal periorbital soft tissue swelling.    Within the limitations of CT, there is no scan evidence of acute injury of the ocular globes or postseptal intraorbital soft tissues.    No acute mandibular, maxillofacial, or orbital fracture deformity.    No dislocation of either TMJ.    There remains some scattered multifocal mucosal disease in paranasal sinuses, and partially opacified right mastoid air cells.  These findings are perhaps minimally  increased from 01/09/2022.    Cervical spine CT:    Diffuse osteopenia.    Straightened lower cervical lordosis.    Pre-existing multilevel degenerative changes, with minimal spinal canal stenosis but varying levels of neural foraminal stenosis.    No acute cervical vertebral fracture deformity.    No acute traumatic vertebral malalignment in the cervical spine.  There is mild pre-existing C5-C6 anterolisthesis, similar to 2004, and likely on the basis of the advanced degenerative changes.    There is focal angulation/buckling the superior aspect of the anterior cortex of the T3 vertebral body.  This could represent a anterior compression fracture.  There are some associated degenerative changes, perhaps favoring an old injury.  But an acute T3 compression fracture is difficult to fully exclude, especially in this osteopenic patient.  No osseous retropulsion.    Small, diffusely hypoattenuating thyroid gland.    Biapical irregular reticulonodular pleural-pulmonary opacities most likely represent chronic fibronodular changes.    There is some minimal biapical interlobular pulmonary septal thickening; although this carries a broad differential diagnosis, interstitial pulmonary edema is a common etiology.                                        CT Cervical Spine Without Contrast (Final result)  Result time 10/02/23 02:50:53      Final result by Diego Palma MD (10/02/23 02:50:53)                   Impression:      HEAD CT:    Right frontal extra cranial soft tissue swelling and hematoma, with a gas-containing wound.    No underlying radiopaque foreign body, depressed calvarial fracture or acute intracranial abnormality.    MAXILLOFACIAL CT:    Right preseptal periorbital soft tissue swelling.    No acute fracture deformity.    CERVICAL SPINE CT:    T3 compression fracture, age indeterminate.    Diffuse osteopenia.    Multilevel degenerative changes, mainly caused neural foraminal stenosis.    No acute fracture  deformity or acute traumatic malalignment in the cervical spine.    Small, hypoattenuating thyroid gland.  Correlate clinically for possible diminished thyroid function.    Biapical pulmonary opacities most likely represent chronic fibronodular changes but there are no corresponding studies with which to effectively compare.  Correlate clinically.  Consider outpatient chest CT in 3-6 months to further characterize this and help further exclude neoplasm.    This report was flagged in Epic as abnormal.      Electronically signed by: Diego Palma  Date:    10/02/2023  Time:    02:50               Narrative:    EXAMINATION:  CT HEAD WITHOUT CONTRAST; CT MAXILLOFACIAL WITHOUT CONTRAST; CT CERVICAL SPINE WITHOUT CONTRAST    CLINICAL HISTORY:  Head trauma, moderate-severe;; Facial trauma, blunt;; Neck trauma, impaired ROM (Age 16-64y);    TECHNIQUE:  Low dose axial images were obtained through the head.  Coronal and sagittal reformations were also performed. Contrast was not administered.    Low dose axial images were obtained through the maxillofacial bone.  Coronal and sagittal reformations were also performed. Contrast was not administered.    Low dose axial images were obtained through the cervical spine.  Coronal and sagittal reformations were also performed. Contrast was not administered.    COMPARISON:  Noncontrast head CT 01/09/2022.    Thoracic spine radiographs 08/31/2004 (including a swimmer's view which includes portions of the cervical spine).    FINDINGS:  Artifacts related to beam hardening and/or motion degrade portions of the scans.  Allowing for the artifacts, the findings are as follows...    Head CT:    Right frontal extra cranial soft tissue swelling and hematoma, with a gas-containing wound.  The extracranial soft tissue swelling extends caudally into the right preseptal periorbital soft tissues.    No depressed calvarial fracture, acute intracranial hemorrhage, hydrocephalus, pathologic extra-axial  fluid collection, discrete intracranial mass or mass effect, midline shift, brain herniation, brain edema, or discrete acute large vascular territory brain infarct.    Partial empty sella.    There remains partial opacification of the right mastoid air cells, perhaps minimally increased from 01/09/2022.    Maxillofacial CT:    Right preseptal periorbital soft tissue swelling.    Within the limitations of CT, there is no scan evidence of acute injury of the ocular globes or postseptal intraorbital soft tissues.    No acute mandibular, maxillofacial, or orbital fracture deformity.    No dislocation of either TMJ.    There remains some scattered multifocal mucosal disease in paranasal sinuses, and partially opacified right mastoid air cells.  These findings are perhaps minimally increased from 01/09/2022.    Cervical spine CT:    Diffuse osteopenia.    Straightened lower cervical lordosis.    Pre-existing multilevel degenerative changes, with minimal spinal canal stenosis but varying levels of neural foraminal stenosis.    No acute cervical vertebral fracture deformity.    No acute traumatic vertebral malalignment in the cervical spine.  There is mild pre-existing C5-C6 anterolisthesis, similar to 2004, and likely on the basis of the advanced degenerative changes.    There is focal angulation/buckling the superior aspect of the anterior cortex of the T3 vertebral body.  This could represent a anterior compression fracture.  There are some associated degenerative changes, perhaps favoring an old injury.  But an acute T3 compression fracture is difficult to fully exclude, especially in this osteopenic patient.  No osseous retropulsion.    Small, diffusely hypoattenuating thyroid gland.    Biapical irregular reticulonodular pleural-pulmonary opacities most likely represent chronic fibronodular changes.    There is some minimal biapical interlobular pulmonary septal thickening; although this carries a broad differential  diagnosis, interstitial pulmonary edema is a common etiology.                                        CT Head Without Contrast (Final result)  Result time 10/02/23 02:50:53      Final result by Diego Palma MD (10/02/23 02:50:53)                   Impression:      HEAD CT:    Right frontal extra cranial soft tissue swelling and hematoma, with a gas-containing wound.    No underlying radiopaque foreign body, depressed calvarial fracture or acute intracranial abnormality.    MAXILLOFACIAL CT:    Right preseptal periorbital soft tissue swelling.    No acute fracture deformity.    CERVICAL SPINE CT:    T3 compression fracture, age indeterminate.    Diffuse osteopenia.    Multilevel degenerative changes, mainly caused neural foraminal stenosis.    No acute fracture deformity or acute traumatic malalignment in the cervical spine.    Small, hypoattenuating thyroid gland.  Correlate clinically for possible diminished thyroid function.    Biapical pulmonary opacities most likely represent chronic fibronodular changes but there are no corresponding studies with which to effectively compare.  Correlate clinically.  Consider outpatient chest CT in 3-6 months to further characterize this and help further exclude neoplasm.    This report was flagged in Epic as abnormal.      Electronically signed by: Diego Palma  Date:    10/02/2023  Time:    02:50               Narrative:    EXAMINATION:  CT HEAD WITHOUT CONTRAST; CT MAXILLOFACIAL WITHOUT CONTRAST; CT CERVICAL SPINE WITHOUT CONTRAST    CLINICAL HISTORY:  Head trauma, moderate-severe;; Facial trauma, blunt;; Neck trauma, impaired ROM (Age 16-64y);    TECHNIQUE:  Low dose axial images were obtained through the head.  Coronal and sagittal reformations were also performed. Contrast was not administered.    Low dose axial images were obtained through the maxillofacial bone.  Coronal and sagittal reformations were also performed. Contrast was not administered.    Low dose axial  images were obtained through the cervical spine.  Coronal and sagittal reformations were also performed. Contrast was not administered.    COMPARISON:  Noncontrast head CT 01/09/2022.    Thoracic spine radiographs 08/31/2004 (including a swimmer's view which includes portions of the cervical spine).    FINDINGS:  Artifacts related to beam hardening and/or motion degrade portions of the scans.  Allowing for the artifacts, the findings are as follows...    Head CT:    Right frontal extra cranial soft tissue swelling and hematoma, with a gas-containing wound.  The extracranial soft tissue swelling extends caudally into the right preseptal periorbital soft tissues.    No depressed calvarial fracture, acute intracranial hemorrhage, hydrocephalus, pathologic extra-axial fluid collection, discrete intracranial mass or mass effect, midline shift, brain herniation, brain edema, or discrete acute large vascular territory brain infarct.    Partial empty sella.    There remains partial opacification of the right mastoid air cells, perhaps minimally increased from 01/09/2022.    Maxillofacial CT:    Right preseptal periorbital soft tissue swelling.    Within the limitations of CT, there is no scan evidence of acute injury of the ocular globes or postseptal intraorbital soft tissues.    No acute mandibular, maxillofacial, or orbital fracture deformity.    No dislocation of either TMJ.    There remains some scattered multifocal mucosal disease in paranasal sinuses, and partially opacified right mastoid air cells.  These findings are perhaps minimally increased from 01/09/2022.    Cervical spine CT:    Diffuse osteopenia.    Straightened lower cervical lordosis.    Pre-existing multilevel degenerative changes, with minimal spinal canal stenosis but varying levels of neural foraminal stenosis.    No acute cervical vertebral fracture deformity.    No acute traumatic vertebral malalignment in the cervical spine.  There is mild  pre-existing C5-C6 anterolisthesis, similar to 2004, and likely on the basis of the advanced degenerative changes.    There is focal angulation/buckling the superior aspect of the anterior cortex of the T3 vertebral body.  This could represent a anterior compression fracture.  There are some associated degenerative changes, perhaps favoring an old injury.  But an acute T3 compression fracture is difficult to fully exclude, especially in this osteopenic patient.  No osseous retropulsion.    Small, diffusely hypoattenuating thyroid gland.    Biapical irregular reticulonodular pleural-pulmonary opacities most likely represent chronic fibronodular changes.    There is some minimal biapical interlobular pulmonary septal thickening; although this carries a broad differential diagnosis, interstitial pulmonary edema is a common etiology.                                       X-Ray Shoulder Trauma Right (Final result)  Result time 10/02/23 02:18:43      Final result by Diego Palma MD (10/02/23 02:18:43)                   Impression:      Right distal clavicle fracture.      Electronically signed by: Diego Palma  Date:    10/02/2023  Time:    02:18               Narrative:    EXAMINATION:  XR SHOULDER TRAUMA 3 VIEW RIGHT    CLINICAL HISTORY:  Unspecified fall, initial encounter    TECHNIQUE:  Three or four views of the right shoulder were performed.    COMPARISON:  Right shoulder series 05/16/2005    FINDINGS:  Mildly displaced fracture of the right distal clavicle., possibly minimally comminuted.    Surrounding soft tissue swelling.    No glenohumeral dislocation.    The acromioclavicular and coracoclavicular distances appear grossly normal, on these non-stressed images.    Small nodular calcification projecting in the region the distal aspect of the rotator cuff tendons, possibly related to chronic calcific tendinitis.                                       X-Ray Chest AP Portable (Final result)  Result time 10/02/23  02:17:21      Final result by Adolfo Howell MD (10/02/23 02:17:21)                   Impression:      No acute abnormality.      Electronically signed by: Adolfo Howell  Date:    10/02/2023  Time:    02:17               Narrative:    EXAMINATION:  XR CHEST AP PORTABLE    CLINICAL HISTORY:  Unspecified fall, initial encounter    TECHNIQUE:  Single frontal view of the chest was performed.    COMPARISON:  01/17/2022    FINDINGS:  The lungs are clear, with normal appearance of pulmonary vasculature and no pleural effusion or pneumothorax.    The cardiac silhouette is normal in size. The hilar and mediastinal contours are unremarkable.    Bones are intact.                                       Medications   HYDROmorphone injection 1 mg (1 mg Intravenous Given 10/1/23 2339)   sodium chloride 0.9% bolus 1,000 mL 1,000 mL (0 mLs Intravenous Stopped 10/2/23 0225)   HYDROmorphone injection 0.5 mg (0.5 mg Intravenous Given 10/2/23 0051)   HYDROmorphone injection 0.5 mg (0.5 mg Intravenous Given 10/2/23 0225)   droPERidol injection 1.25 mg (1.25 mg Intravenous Given 10/2/23 0230)   Tdap (BOOSTRIX) vaccine injection 0.5 mL (0.5 mLs Intramuscular Given 10/2/23 0415)   LIDOcaine (PF) 10 mg/ml (1%) injection 50 mg (50 mg Infiltration Given by Provider 10/2/23 0430)   HYDROmorphone injection 1 mg (1 mg Intravenous Given 10/2/23 0411)     Medical Decision Making  Initial assessment  63-year-old female presenting for evaluation after a fall. Patient is able to vocalise, breathing spontaneously, hemodynamically stable, oriented, moving all 4 limbs spontaneously.  Examination consistent with laceration to right temporal region and tenderness to palpation of right clavicle with decreased range of motion at shoulder.      Differential diagnosis  Mechanical fall complicated by:  - intracranial bleed  - skull fracture  - vertebral fracture  - shoulder dislocation  - clavicular fracture  - traumatic brain injury  Nonmechanical fall  and considered ACS/CVA//electrolyte abnormality/polypharmacy/ withdrawal from opioids      ED management  Patient was stable on presentation however given concern for nonmechanical fall decided to do broad workup.  CBC consistent with leukocytosis likely secondary to stress response.  CMP benign CT head without concerning features.  CT max face shows only swelling however no underlying fractures noted.  CT C-spine consistent with old appearing fractures and pulmonary nodules.  Shoulder x-ray consistent with distal clavicular fracture with minimal displacement.  Given closed fracture I decided to place patient in sling, provide pain control, and referred to Orthopedics as outpatient.  Patient's laceration was repaired and patient tolerated procedure well.  Patient was able to ambulate.  Patient was discharged home with plan to follow up at [facility] for clavicular fracture.  Patient remained stable in emergency department.      Amount and/or Complexity of Data Reviewed  Labs: ordered. Decision-making details documented in ED Course.  Radiology: ordered. Decision-making details documented in ED Course.    Risk  Prescription drug management.               ED Course as of 10/02/23 0751   [place] Oct 01, 2023   2303 BP(!): 160/100 [PM]   2303 Temp: 98.4 °F (36.9 °C) [PM]   2304 Pulse: 101 [PM]   2304 SpO2: 99 % [PM]   Mon Oct 02, 2023   0008 WBC(!): 15.97 [PM]   0008 Hemoglobin: 13.3 [PM]   0008 Gran # (ANC)(!): 12.5 [PM]   0320 Bacteria, UA: Rare [PM]   0320 Urinalysis Microscopic [PM]   0320 WBC, UA: 3 [PM]   0320 Urinalysis Microscopic  Low suspicion for UTI or hematuria [PM]   0321 CT Head Without Contrast(!)  As per my independent interpretation signs concerning for hematoma over right orbital region without associated fracture [PM]   0321 CT Maxillofacial Without Contrast(!)  As per my independent interpretation no signs concerning for fractures [PM]   0322 CT Cervical Spine Without Contrast(!)  As per my  independent interpretation no signs concerning for fractures or dislocations of cervical spine.  T3 vertebral fracture noted but old appearing.  Pulmonary nodules noted. [PM]   0322 X-Ray Chest AP Portable  As per my interpretation, the chest x-ray is grossly normal with no acute findings concerning for new infiltrates, new edema, new effusions, changes in cardiac silhouette.    [PM]   0322 X-Ray Shoulder Trauma Right  As per my independent interpretation signs concerning for right comminuted fracture of distal clavicle [PM]      ED Course User Index  [PM] Kamala Wright MD                      Clinical Impression:   Final diagnoses:  [W19.XXXA] Fall  [S42.031A] Closed displaced fracture of acromial end of right clavicle, initial encounter (Primary)  [S01.81XA] Facial laceration, initial encounter        ED Disposition Condition    Discharge Stable          ED Prescriptions       Medication Sig Dispense Start Date End Date Auth. Provider    oxyCODONE (ROXICODONE) 5 MG immediate release tablet Take 1 tablet (5 mg total) by mouth every 4 (four) hours as needed for Pain. 10 tablet 10/2/2023 -- Angel Plaza MD          Follow-up Information       Follow up With Specialties Details Why Contact Info    Chino Figueroa MD Endocrinology Schedule an appointment as soon as possible for a visit in 2 days  [address]  [address]  [place] [id number] [phone]      [facility] - Emergency Dept Emergency Medicine  As needed, If symptoms worsen [address] [address] [phone]             Kamala Wright MD  Resident  10/02/23 0750

## 2023-10-02 NOTE — ED NOTES
I-STAT Chem-8+ Results:   Value Reference Range   Sodium 135 136-145 mmol/L   Potassium  4.4 3.5-5.1 mmol/L   Chloride 102  mmol/L   Ionized Calcium 1.06 1.06-1.42 mmol/L   CO2 (measured) 21 23-29 mmol/L   Glucose 72  mg/dL   BUN 5 6-30 mg/dL   Creatinine 0.4 0.5-1.4 mg/dL   Hematocrit 40 36-54%

## 2023-10-03 ENCOUNTER — OFFICE VISIT (OUTPATIENT)
Dept: ORTHOPEDICS | Facility: CLINIC | Age: 63
End: 2023-10-03
Payer: COMMERCIAL

## 2023-10-03 DIAGNOSIS — S42.031A CLOSED DISPLACED FRACTURE OF ACROMIAL END OF RIGHT CLAVICLE, INITIAL ENCOUNTER: ICD-10-CM

## 2023-10-03 DIAGNOSIS — S42.031A: Primary | ICD-10-CM

## 2023-10-03 PROCEDURE — 1159F PR MEDICATION LIST DOCUMENTED IN MEDICAL RECORD: ICD-10-PCS | Mod: CPTII,S$GLB,, | Performed by: PHYSICIAN ASSISTANT

## 2023-10-03 PROCEDURE — 99204 PR OFFICE/OUTPT VISIT, NEW, LEVL IV, 45-59 MIN: ICD-10-PCS | Mod: S$GLB,,, | Performed by: PHYSICIAN ASSISTANT

## 2023-10-03 PROCEDURE — 1159F MED LIST DOCD IN RCRD: CPT | Mod: CPTII,S$GLB,, | Performed by: PHYSICIAN ASSISTANT

## 2023-10-03 PROCEDURE — 99204 OFFICE O/P NEW MOD 45 MIN: CPT | Mod: S$GLB,,, | Performed by: PHYSICIAN ASSISTANT

## 2023-10-03 PROCEDURE — 99999 PR PBB SHADOW E&M-EST. PATIENT-LVL III: CPT | Mod: PBBFAC,,, | Performed by: PHYSICIAN ASSISTANT

## 2023-10-03 PROCEDURE — 99999 PR PBB SHADOW E&M-EST. PATIENT-LVL III: ICD-10-PCS | Mod: PBBFAC,,, | Performed by: PHYSICIAN ASSISTANT

## 2023-10-03 RX ORDER — METHOCARBAMOL 500 MG/1
500 TABLET, FILM COATED ORAL 3 TIMES DAILY
Qty: 42 TABLET | Refills: 0 | Status: SHIPPED | OUTPATIENT
Start: 2023-10-03 | End: 2023-10-11 | Stop reason: SDUPTHER

## 2023-10-03 RX ORDER — ACETAMINOPHEN 500 MG
1000 TABLET ORAL EVERY 8 HOURS
Qty: 180 TABLET | Refills: 0 | Status: SHIPPED | OUTPATIENT
Start: 2023-10-03 | End: 2023-11-02

## 2023-10-03 RX ORDER — OXYCODONE HYDROCHLORIDE 5 MG/1
5 TABLET ORAL EVERY 4 HOURS PRN
Qty: 84 TABLET | Refills: 0 | Status: SHIPPED | OUTPATIENT
Start: 2023-10-03 | End: 2023-10-11

## 2023-10-03 NOTE — PROGRESS NOTES
Subjective:     Patient ID: Eden Kuhn is a 63 y.o. female.    Chief Complaint: No chief complaint on file.    HPI  Patient is a 63 year old female who presented to the ED on 10/01/23 s/p fall down 5 steps and landing on her right shoulder. Ed reports that this was secondary to narcotic withdrawal.   RADS: Mildly displaced fracture of the right distal clavicle  Patient treated in a sling. She stated that she is doing no so great. She reports that her pain is not controlled. She takes Norco 10 at baseline and has not been able to get filled because she cannot find pharmacy which has it. She took tyelnol arthritis and oxycodone 5mg without any relief. She stated that she has tried to use the sling but it is uncomfortable.     Review of Systems   Constitutional: Negative for chills and fever.   Cardiovascular:  Negative for chest pain.   Respiratory:  Negative for cough and shortness of breath.    Skin:  Negative for color change, dry skin, itching, nail changes, poor wound healing and rash.   Musculoskeletal:  Positive for falls.   Neurological:  Negative for dizziness.   Psychiatric/Behavioral:  Negative for altered mental status. The patient is not nervous/anxious.    All other systems reviewed and are negative.      Objective:       General    Constitutional: She is oriented to person, place, and time. She appears well-developed and well-nourished. No distress.   HENT:   Head: Atraumatic.   Eyes: Conjunctivae are normal.   Cardiovascular:  Normal rate.            Pulmonary/Chest: Effort normal.   Neurological: She is alert and oriented to person, place, and time.   Psychiatric: She has a normal mood and affect. Her behavior is normal.         Right Shoulder Exam     Inspection/Observation   Bruising: present    Tenderness   The patient is tender to palpation of the clavicle.    Range of Motion   Active abduction:  abnormal   Passive abduction:  abnormal   Forward Flexion:  abnormal   External Rotation 0  degrees:  abnormal   External Rotation 90 degrees: abnormal  Internal rotation 0 degrees:  abnormal   Internal rotation 90 degrees:  abnormal       Physical Exam  Constitutional:       General: She is not in acute distress.     Appearance: She is well-developed and well-nourished. She is not diaphoretic.   HENT:      Head: Atraumatic.   Eyes:      Conjunctiva/sclera: Conjunctivae normal.   Cardiovascular:      Rate and Rhythm: Normal rate.   Pulmonary:      Effort: Pulmonary effort is normal.   Neurological:      Mental Status: She is alert and oriented to person, place, and time.   Psychiatric:         Mood and Affect: Mood and affect normal.         Behavior: Behavior normal.         RADS: reviewed by myself and .   Mildly displaced fracture of the right distal clavicle., possibly minimally comminuted  Assessment:     Encounter Diagnosis   Name Primary?    Traumatic closed displaced fracture of acromial end of clavicle, right, initial encounter Yes       Plan:      Per  at this time plan is for non-operative management  Will continue sling and NWB. Ok to remove for hygine and range of motion of finger wrist and elbow.   NWB  Pain medication: multimodal  Return to clinic in 1 week at that time x-ray of her clavicle is needed, consider referral for PT.

## 2023-10-10 NOTE — PROGRESS NOTES
Subjective:     Patient ID: Eden Kuhn is a 63 y.o. female.    Chief Complaint: No chief complaint on file.    HPI  Patient is a 63 year old female who presented to the ED on 10/01/23 s/p fall down 5 steps and landing on her right shoulder. Ed reports that this was secondary to narcotic withdrawal.   RADS: Mildly displaced fracture of the right distal clavicle  Patient treated in a sling. She stated that she is doing no so great. She reports that her pain is not controlled. She takes Norco 10 at baseline and has not been able to get filled because she cannot find pharmacy which has it. She took tyelnol arthritis and oxycodone 5mg without any relief. She stated that she has tried to use the sling but it is uncomfortable.     10/11/23: Over all better, but continued pain. She has used the sling as needed. She has been NWB. She is taking pain medication. No numbness or tingling.     Review of Systems   Constitutional: Negative for chills and fever.   Cardiovascular:  Negative for chest pain.   Respiratory:  Negative for cough and shortness of breath.    Skin:  Negative for color change, dry skin, itching, nail changes, poor wound healing and rash.   Musculoskeletal:  Positive for falls.   Neurological:  Negative for dizziness.   Psychiatric/Behavioral:  Negative for altered mental status. The patient is not nervous/anxious.    All other systems reviewed and are negative.      Objective:       General    Constitutional: She is oriented to person, place, and time. She appears well-developed and well-nourished. No distress.   HENT:   Head: Atraumatic.   Eyes: Conjunctivae are normal.   Cardiovascular:  Normal rate.            Pulmonary/Chest: Effort normal.   Neurological: She is alert and oriented to person, place, and time.   Psychiatric: She has a normal mood and affect. Her behavior is normal.         Right Shoulder Exam     Inspection/Observation   Bruising: present    Tenderness   The patient is tender to  palpation of the clavicle.    Range of Motion   Active abduction:  abnormal   Passive abduction:  abnormal   Forward Flexion:  abnormal   External Rotation 0 degrees:  abnormal   External Rotation 90 degrees: abnormal  Internal rotation 0 degrees:  abnormal   Internal rotation 90 degrees:  abnormal       Physical Exam  Constitutional:       General: She is not in acute distress.     Appearance: She is well-developed and well-nourished. She is not diaphoretic.   HENT:      Head: Atraumatic.   Eyes:      Conjunctiva/sclera: Conjunctivae normal.   Cardiovascular:      Rate and Rhythm: Normal rate.   Pulmonary:      Effort: Pulmonary effort is normal.   Neurological:      Mental Status: She is alert and oriented to person, place, and time.   Psychiatric:         Mood and Affect: Mood and affect normal.         Behavior: Behavior normal.       RADS: reviewed by myself and .   Mildly displaced fracture of the right distal clavicle., possibly minimally comminuted  Assessment:     Encounter Diagnosis   Name Primary?    Closed displaced fracture of acromial end of right clavicle with routine healing, subsequent encounter Yes       Plan:      Per  at this time plan is for non-operative management  Will wean sling and NWB. Ok to remove for hygine and range of motion of finger wrist and elbow.   NWB  Order placed for PT  Pain medication: multimodal  Return to clinic in 4 week at that time x-ray of her clavicle is needed, consider referral for PT.

## 2023-10-11 ENCOUNTER — OFFICE VISIT (OUTPATIENT)
Dept: ORTHOPEDICS | Facility: CLINIC | Age: 63
End: 2023-10-11
Payer: COMMERCIAL

## 2023-10-11 ENCOUNTER — HOSPITAL ENCOUNTER (OUTPATIENT)
Dept: RADIOLOGY | Facility: HOSPITAL | Age: 63
Discharge: HOME | End: 2023-10-11
Attending: PHYSICIAN ASSISTANT
Payer: COMMERCIAL

## 2023-10-11 VITALS — HEIGHT: 65 IN | WEIGHT: 110 LBS | BODY MASS INDEX: 18.33 KG/M2

## 2023-10-11 DIAGNOSIS — S42.031D CLOSED DISPLACED FRACTURE OF ACROMIAL END OF RIGHT CLAVICLE WITH ROUTINE HEALING, SUBSEQUENT ENCOUNTER: ICD-10-CM

## 2023-10-11 DIAGNOSIS — S42.031A: ICD-10-CM

## 2023-10-11 DIAGNOSIS — S42.031D CLOSED DISPLACED FRACTURE OF ACROMIAL END OF RIGHT CLAVICLE WITH ROUTINE HEALING, SUBSEQUENT ENCOUNTER: Primary | ICD-10-CM

## 2023-10-11 PROCEDURE — 73000 X-RAY EXAM OF COLLAR BONE: CPT | Mod: TC,RT

## 2023-10-11 PROCEDURE — 99213 OFFICE O/P EST LOW 20 MIN: CPT | Mod: S$GLB,,, | Performed by: PHYSICIAN ASSISTANT

## 2023-10-11 PROCEDURE — 99999 PR PBB SHADOW E&M-EST. PATIENT-LVL III: CPT | Mod: PBBFAC,,, | Performed by: PHYSICIAN ASSISTANT

## 2023-10-11 PROCEDURE — 99213 PR OFFICE/OUTPT VISIT, EST, LEVL III, 20-29 MIN: ICD-10-PCS | Mod: S$GLB,,, | Performed by: PHYSICIAN ASSISTANT

## 2023-10-11 PROCEDURE — 3008F BODY MASS INDEX DOCD: CPT | Mod: CPTII,S$GLB,, | Performed by: PHYSICIAN ASSISTANT

## 2023-10-11 PROCEDURE — 73000 XR CLAVICLE RIGHT: ICD-10-PCS | Mod: 26,RT,, | Performed by: RADIOLOGY

## 2023-10-11 PROCEDURE — 1159F PR MEDICATION LIST DOCUMENTED IN MEDICAL RECORD: ICD-10-PCS | Mod: CPTII,S$GLB,, | Performed by: PHYSICIAN ASSISTANT

## 2023-10-11 PROCEDURE — 73000 X-RAY EXAM OF COLLAR BONE: CPT | Mod: 26,RT,, | Performed by: RADIOLOGY

## 2023-10-11 PROCEDURE — 1159F MED LIST DOCD IN RCRD: CPT | Mod: CPTII,S$GLB,, | Performed by: PHYSICIAN ASSISTANT

## 2023-10-11 PROCEDURE — 99999 PR PBB SHADOW E&M-EST. PATIENT-LVL III: ICD-10-PCS | Mod: PBBFAC,,, | Performed by: PHYSICIAN ASSISTANT

## 2023-10-11 PROCEDURE — 3008F PR BODY MASS INDEX (BMI) DOCUMENTED: ICD-10-PCS | Mod: CPTII,S$GLB,, | Performed by: PHYSICIAN ASSISTANT

## 2023-10-11 RX ORDER — METHOCARBAMOL 500 MG/1
500 TABLET, FILM COATED ORAL 3 TIMES DAILY
Qty: 42 TABLET | Refills: 0 | Status: SHIPPED | OUTPATIENT
Start: 2023-10-11 | End: 2023-10-25

## 2023-10-11 RX ORDER — HYDROCODONE BITARTRATE AND ACETAMINOPHEN 10; 325 MG/1; MG/1
1 TABLET ORAL EVERY 4 HOURS PRN
Qty: 42 TABLET | Refills: 0 | Status: SHIPPED | OUTPATIENT
Start: 2023-10-11 | End: 2023-11-09 | Stop reason: SDUPTHER

## 2023-11-02 ENCOUNTER — PATIENT MESSAGE (OUTPATIENT)
Dept: ORTHOPEDICS | Facility: CLINIC | Age: 63
End: 2023-11-02
Payer: COMMERCIAL

## 2023-11-03 DIAGNOSIS — S42.031D CLOSED DISPLACED FRACTURE OF ACROMIAL END OF RIGHT CLAVICLE WITH ROUTINE HEALING, SUBSEQUENT ENCOUNTER: ICD-10-CM

## 2023-11-03 DIAGNOSIS — S42.031D CLOSED DISPLACED FRACTURE OF ACROMIAL END OF RIGHT CLAVICLE WITH ROUTINE HEALING, SUBSEQUENT ENCOUNTER: Primary | ICD-10-CM

## 2023-11-03 RX ORDER — OXYCODONE HYDROCHLORIDE 5 MG/1
5 TABLET ORAL EVERY 4 HOURS PRN
Qty: 42 TABLET | Refills: 0 | Status: SHIPPED | OUTPATIENT
Start: 2023-11-03 | End: 2023-11-10

## 2023-11-03 RX ORDER — OXYCODONE HYDROCHLORIDE 5 MG/1
5 TABLET ORAL EVERY 4 HOURS PRN
Qty: 42 TABLET | Refills: 0 | Status: SHIPPED | OUTPATIENT
Start: 2023-11-03 | End: 2023-11-03 | Stop reason: SDUPTHER

## 2023-11-09 ENCOUNTER — OFFICE VISIT (OUTPATIENT)
Dept: ORTHOPEDICS | Facility: CLINIC | Age: 63
End: 2023-11-09
Payer: COMMERCIAL

## 2023-11-09 ENCOUNTER — HOSPITAL ENCOUNTER (OUTPATIENT)
Dept: RADIOLOGY | Facility: HOSPITAL | Age: 63
Discharge: HOME | End: 2023-11-09
Attending: PHYSICIAN ASSISTANT
Payer: COMMERCIAL

## 2023-11-09 VITALS — BODY MASS INDEX: 18.33 KG/M2 | WEIGHT: 110 LBS | HEIGHT: 65 IN

## 2023-11-09 DIAGNOSIS — S42.031A: ICD-10-CM

## 2023-11-09 DIAGNOSIS — S42.031D CLOSED DISPLACED FRACTURE OF ACROMIAL END OF RIGHT CLAVICLE WITH ROUTINE HEALING, SUBSEQUENT ENCOUNTER: Primary | ICD-10-CM

## 2023-11-09 DIAGNOSIS — S42.031D CLOSED DISPLACED FRACTURE OF ACROMIAL END OF RIGHT CLAVICLE WITH ROUTINE HEALING, SUBSEQUENT ENCOUNTER: ICD-10-CM

## 2023-11-09 PROCEDURE — 3008F PR BODY MASS INDEX (BMI) DOCUMENTED: ICD-10-PCS | Mod: CPTII,S$GLB,, | Performed by: PHYSICIAN ASSISTANT

## 2023-11-09 PROCEDURE — 1159F PR MEDICATION LIST DOCUMENTED IN MEDICAL RECORD: ICD-10-PCS | Mod: CPTII,S$GLB,, | Performed by: PHYSICIAN ASSISTANT

## 2023-11-09 PROCEDURE — 1159F MED LIST DOCD IN RCRD: CPT | Mod: CPTII,S$GLB,, | Performed by: PHYSICIAN ASSISTANT

## 2023-11-09 PROCEDURE — 73000 X-RAY EXAM OF COLLAR BONE: CPT | Mod: TC,RT

## 2023-11-09 PROCEDURE — 99213 OFFICE O/P EST LOW 20 MIN: CPT | Mod: S$GLB,,, | Performed by: PHYSICIAN ASSISTANT

## 2023-11-09 PROCEDURE — 73000 XR CLAVICLE RIGHT: ICD-10-PCS | Mod: 26,RT,, | Performed by: RADIOLOGY

## 2023-11-09 PROCEDURE — 99213 PR OFFICE/OUTPT VISIT, EST, LEVL III, 20-29 MIN: ICD-10-PCS | Mod: S$GLB,,, | Performed by: PHYSICIAN ASSISTANT

## 2023-11-09 PROCEDURE — 3008F BODY MASS INDEX DOCD: CPT | Mod: CPTII,S$GLB,, | Performed by: PHYSICIAN ASSISTANT

## 2023-11-09 PROCEDURE — 99999 PR PBB SHADOW E&M-EST. PATIENT-LVL IV: CPT | Mod: PBBFAC,,, | Performed by: PHYSICIAN ASSISTANT

## 2023-11-09 PROCEDURE — 73000 X-RAY EXAM OF COLLAR BONE: CPT | Mod: 26,RT,, | Performed by: RADIOLOGY

## 2023-11-09 PROCEDURE — 99999 PR PBB SHADOW E&M-EST. PATIENT-LVL IV: ICD-10-PCS | Mod: PBBFAC,,, | Performed by: PHYSICIAN ASSISTANT

## 2023-11-09 RX ORDER — HYDROCODONE BITARTRATE AND ACETAMINOPHEN 10; 325 MG/1; MG/1
1 TABLET ORAL EVERY 4 HOURS PRN
Qty: 42 TABLET | Refills: 0 | Status: SHIPPED | OUTPATIENT
Start: 2023-11-09 | End: 2023-11-15 | Stop reason: SDUPTHER

## 2023-11-15 DIAGNOSIS — S42.031A: ICD-10-CM

## 2023-11-15 RX ORDER — HYDROCODONE BITARTRATE AND ACETAMINOPHEN 10; 325 MG/1; MG/1
1 TABLET ORAL EVERY 4 HOURS PRN
Qty: 42 TABLET | Refills: 0 | Status: CANCELLED | OUTPATIENT
Start: 2023-11-15 | End: 2023-11-22

## 2023-11-15 RX ORDER — HYDROCODONE BITARTRATE AND ACETAMINOPHEN 10; 325 MG/1; MG/1
1 TABLET ORAL EVERY 4 HOURS PRN
Qty: 42 TABLET | Refills: 0 | Status: SHIPPED | OUTPATIENT
Start: 2023-11-15 | End: 2023-11-22

## 2023-11-20 ENCOUNTER — CLINICAL SUPPORT (OUTPATIENT)
Dept: REHABILITATION | Facility: HOSPITAL | Age: 63
End: 2023-11-20
Payer: COMMERCIAL

## 2023-11-20 DIAGNOSIS — S42.031A: ICD-10-CM

## 2023-11-20 DIAGNOSIS — M25.511 ACUTE PAIN OF RIGHT SHOULDER: Primary | ICD-10-CM

## 2023-11-20 DIAGNOSIS — M25.611 DECREASED RIGHT SHOULDER RANGE OF MOTION: ICD-10-CM

## 2023-11-20 PROCEDURE — 97110 THERAPEUTIC EXERCISES: CPT | Mod: PO

## 2023-11-20 PROCEDURE — 97165 OT EVAL LOW COMPLEX 30 MIN: CPT | Mod: PO

## 2023-11-20 NOTE — PLAN OF CARE
[facility] Occupational Therapy  Initial Evaluation     Date: 11/20/2023  Patient: Eden Kuhn  Chart Number: [id number]    Therapy Diagnosis:   1. Traumatic closed displaced fracture of acromial end of clavicle, right, initial encounter  Ambulatory referral/consult to Physical/Occupational Therapy        Medical Diagnosis: S42.031D (ICD-10-CM) - Closed displaced fracture of acromial end of right clavicle with routine healing, subsequent encounter S42.031A (ICD-10-CM) - Traumatic closed displaced fracture of acromial end of clavicle, right, initial encounter    Referring Physician: Jenny Grajeda PA-C  Physician Orders: Eval and treat  Date of Return to MD: TBTHANIA    Date of Injury: 10/1/23  Date of Surgery: NA    Evaluation Date: 11/20/2023  Authorization Period: 11/20/23 - 12/31/23  Plan of Care Expiration: 2/12/23  Visit #/ Visits Authorized: 1 of 1  FOTO Completion: Initial eval (11/20/2023)  FOTO #2:  FOTO #3:    Time In: 10:30 am  Time Out: 11:15 am  Total Appointment Time (timed & untimed codes): 45 min    Precautions: Standard, NWB    Subjective     History of Current Condition: Eden Kuhn is a 63 y.o. year old Right hand dominant female who fell down 5 steps on 10/1/23 and sustained a Right distal clavicle fracture treated conservatively. Eden Kuhn is referred to Occupational Therapy for evaluation and treatment. Patient presents today with .    Falls: onset on injury    Involved Side: Right  Dominant Side: Right    Date of Onset: 10/1/23  Imaging: xray on 11/9/23  FINDINGS:  Again identified is a recent fracture involving the distal aspect of the right clavicle, with position/alignment of the major fragments demonstrating no significant detrimental interval change since 10/11/2023.  No additional areas suspicious for recent fracture or other significant abnormality identified.  No detrimental change since 10/11/2023 is observed.  Previous Therapy:  "none    Pain:  Functional Pain Scale Rating 0-10:   Current: 7/10  At Best: 7/10  At Worst: 7/10    Location: Right anterior shoulder  Description: "lighting bolt"  Aggravating Factors: reaching behind back, overhead and cross body  Easing Factors: rest, sling    Functional Limitations/Social History:  Prior Level of Function: Independent with all ADLs/IADLs    Current Level of Function:   ADLs: remains independent as of recently, but increased time to complete and difficulty washing back and Left side of body, using primarily LUE  IADLs: assistance from  for cooking/cleaning/laundry  Leisure: spend time with dogs, grandchildren, enjoys riding bike, walking, training dogs, swimming  Driving: No    Occupation:  Retired    Patient's Goals for Therapy: "to get the strength back in my arms"    Past Medical History/Physical Systems Review:   Eden Kuhn  has a past medical history of Anxiety, Arthritis, Insomnia, and Thyroid disease.    Eden Kuhn  has a past surgical history that includes bladder repair; Hysterectomy; Anal fistulotomy (01/21/2002); Cystocele repair (06/06/2011); Eye surgery (02/23/2012); and Esophagogastroduodenoscopy (N/A, 8/3/2018).    Eden has a current medication list which includes the following prescription(s): albuterol, b complex vitamins, diphenoxylate-atropine 2.5-0.025 mg, ergocalciferol, hydrocodone-acetaminophen, levothyroxine, liothyronine, pravastatin, trazodone, and valacyclovir.    Review of patient's allergies indicates:   Allergen Reactions    Ciprocinonide Anaphylaxis          Objective     Mental status: alert, oriented x3    Observation/Appearance:   No bruising, atrophy, skin color changes or edema noted    Sensation: Patient reports tingling at times on lateral shoulder      SHOULDER RANGE OF MOTION:   Measured in degrees of active motion with goniometer   Right  11/20/23 Left  11/20/23   Shoulder Flexion 100 155   Shoulder Abduction 95 145   Shoulder ER " at side 60 85   Shoulder ER at 90* NT NT   Shoulder IR T12 T3       Manual Muscle Test:  Not tested today due to pain, limited motion, precautions        Intake Outcome Measure for FOTO Initial Evaluation Survey    Therapist reviewed FOTO scores for Eden Kuhn on 11/20/2023.   FOTO documents entered into [employer] - see Media section.    Intake Score: 46%         Treatment     Treatment Time In: 11:00 am  Treatment Time Out: 11:15 am  Total Treatment time separate from Evaluation time: 15 min      Eden performed therapeutic exercises for 15 min minutes including:  - Scap retraction (20 reps, 5 sec hold)  - Supine dowel or towel slides: flexion, abduction, ER (2 x 10)  - IR towel stretch (5 reps, 20 sec hold)  - Instructed to perform all exercises pain free, recommended heat prior to HEP and as needed for pain management and to facilitate soft tissue extensibility      Patient Education and Home Exercises     Patient/Family Education Provided:   - Role of OT, goals for OT, scheduling/cancellations - pt verbalized understanding. Discussed insurance limitations with patient.    Written Home Exercises Provided: yes.  Exercises were reviewed and Eden was able to demonstrate them prior to the end of the session.  Eden demonstrated good  understanding of the education provided. See EMR under Patient Instructions for exercises provided during therapy sessions.     Pt was advised to perform these exercises free of pain, and to stop performing them if pain occurs.      Assessment     Eden Kuhn is a 63 y.o. female referred to outpatient occupational therapy and presents with a medical diagnosis of Closed displaced fracture of acromial end of right clavicle.     Assessment of Occupational Performance   Performance Deficits    Physical:  Joint Mobility  Muscle Power/Strength  Muscle Endurance  Gross Motor Coordination  Postural Control  Pain    Cognitive:  Patient reports cognitive deficits since injury  and is awaiting appt with neurology    Psychosocial:    Habits  Routines  Rituals     Following medical record review it is determined that pt will benefit from occupational therapy services in order to maximize pain free and/or functional use of right shoulder. The following goals were discussed with the patient and patient is in agreement with them as to be addressed in the treatment plan. The patient's rehab potential is Good.     Anticipated barriers to occupational therapy: mechanism of injury    Plan of care discussed with patient: Yes  Patient's spiritual, cultural and educational needs considered and patient is agreeable to the plan of care and goals as stated below:     Medical Necessity is demonstrated by the following  Occupational Profile/History  Co-morbidities and personal factors that may impact the plan of care [x] LOW: Brief chart review  [] MODERATE: Expanded chart review   [] HIGH: Extensive chart review    Moderate / High Support Documentation:      Examination  Performance deficits relating to physical, cognitive or psychosocial skills that result in activity limitations and/or participation restrictions  [] LOW: addressing 1-3 Performance deficits  [x] MODERATE: 3-5 Performance deficits  [] HIGH: 5+ Performance deficits (please support below)    Moderate / High Support Documentation:      Treatment Options [] LOW: Limited options  [x] MODERATE: Several options  [] HIGH: Multiple options      Decision Making/ Complexity Score: low       The following goals were discussed with the patient and patient is in agreement with them as to be addressed in the treatment plan.     Long Term Goals (to be met by discharge):  Date Goal Met:     1.) Eden YAO JOVONLuana will demonstrate significantly improved functional performance from re-assessment as measured by a FOTO Intake score of more than 66.    Goal Status:   In progress    2.) Eden YAO JOVONLuana will return to near to prior level of function for ADLs  and household management reporting independence or modified independence.    Goal Status:   In progress    3. Eden Kuhn will report pain 2 out of 10 at worst to increase functional use of affected hand for work and leisure tasks.    Goal Status:   In progress     Short Term Goals (to be met by 12/31/23):  Date Goal Met:     Eden Kuhn will be independent with home exercise program with written instructions.    Goal Status:   In progress    Eden Kuhn will demonstrate 130 degrees of shoulder flex and abduction to improve functional performance in ADLs/work/leisure tasks.    Goal Status:   In progress    Eden Kuhn will demonstrate within 20 lbs of  strength compared to unaffected hand to improve functional grasp for ADLs/work/leisure tasks.    Goal Status:   In progress    Eden Kuhn will demonstrate the ability to complete ADL/IADL tasks with 4/10 pain.    Goal Status:   In progress    Eden Kuhn will be able to resume significantly greater occupational roles independently or modified as demonstrated by a FOTO Intake score of more than 56.    Goal Status:   In progress       Plan     Pt to be treated by Occupational Therapy 3 times per week for 12 weeks during the certification period from 11/20/2023 to 2/12/23 to achieve the established goals.     Treatment to include: Manual therapy/joint mobilizations, Modalities for pain management, US 3 mhz, Therapeutic exercises/activities., Iontophoresis with 2.0 cc Dexamethasone, Strengthening, Edema Control, Electrical Modalities, Joint Protection, and Energy Conservation, as well as any other treatments deemed necessary based on the patient's needs or progress.       Daria Luna OTR/L

## 2023-11-27 ENCOUNTER — CLINICAL SUPPORT (OUTPATIENT)
Dept: REHABILITATION | Facility: HOSPITAL | Age: 63
End: 2023-11-27
Payer: COMMERCIAL

## 2023-11-27 DIAGNOSIS — M25.511 ACUTE PAIN OF RIGHT SHOULDER: Primary | ICD-10-CM

## 2023-11-27 DIAGNOSIS — M25.611 DECREASED RIGHT SHOULDER RANGE OF MOTION: ICD-10-CM

## 2023-11-27 PROCEDURE — 97140 MANUAL THERAPY 1/> REGIONS: CPT | Mod: PO

## 2023-11-27 PROCEDURE — 97110 THERAPEUTIC EXERCISES: CPT | Mod: PO

## 2023-11-27 NOTE — PROGRESS NOTES
"  Occupational Therapy Daily Treatment Note     Date: 11/27/2023  Name: Eden Kuhn  Clinic Number: [id number]    Therapy Diagnosis:   1. Acute pain of right shoulder        2. Decreased right shoulder range of motion          Medical Diagnosis: S42.031D (ICD-10-CM) - Closed displaced fracture of acromial end of right clavicle with routine healing, subsequent encounter S42.031A (ICD-10-CM) - Traumatic closed displaced fracture of acromial end of clavicle, right, initial encounter     Referring Physician: Jenny Grajeda PA-C  Physician Orders: Eval and treat  Date of Return to MD: TBTHANIA     Date of Injury: 10/1/23  Date of Surgery: NA     Evaluation Date: 11/20/2023  Authorization Period: 11/20/23 - 12/31/23  Plan of Care Expiration: 2/12/23  Visit #/ Visits Authorized: 2 of 20  FOTO Completion: Initial eval (11/20/2023)  FOTO #2:  FOTO #3:     Time In: 12:57 pm  Time Out: 1:45 pm  Total Appointment Time (timed & untimed codes): 48 min     Precautions: Standard, NWB      Subjective     History of Current Condition: Eden Kuhn is a 63 y.o. year old Right hand dominant female who fell down 5 steps on 10/1/23 and sustained a Right distal clavicle fracture treated conservatively. Eden Kuhn is referred to Occupational Therapy for evaluation and treatment. Patient presents today with .    Pt reports: "Every day in the afternoon after doing the exercises I get this stabbing pain and numbness in the side of my shoulder (pointing to middle deltoid and lateral upper arm)"  Eden Kuhn was compliant with home exercise program given last session.   Response to previous treatment: tolerated well - notes increase pain by afternoon lasting about an hour  Functional change: none    Pain: 5-6/10  Location: Right shoulder    Objective     Mental status: alert, oriented x3     Observation/Appearance:   No bruising, atrophy, skin color changes or edema noted     Sensation: Patient reports tingling at " times on lateral shoulder        SHOULDER RANGE OF MOTION:   Measured in degrees of active motion with goniometer    Right  11/20/23 Left  11/20/23   Shoulder Flexion 100 155   Shoulder Abduction 95 145   Shoulder ER at side 60 85   Shoulder ER at 90* NT NT   Shoulder IR T12 T3         Manual Muscle Test:  Not tested today due to pain, limited motion, precautions           Intake Outcome Measure for FOTO Initial Evaluation Survey     Therapist reviewed FOTO scores for Eden Kuhn on 11/20/2023.   FOTO documents entered into [employer] - see Media section.     Intake Score: 46%           Treatment     Supervised modalities after being cleared for contraindications: Hot pack applied to the Right shoulder and UT for 8 minutes for increased blood flow and circulation, increased tissue extensibility, and pain management.      Eden received the following manual therapy techniques for 12 minutes:   - UT and pec minor release  - PROM shoulder ER, flexion    Eden performed therapeutic exercises for 28 min minutes including:  - Scap retraction (20 reps, 5 sec hold)  - Supine dowel AAROM flexion (2 x 10)  - Supine GH stabilization with ball, ABCs (1 set) - reports increasing pain  - Side lying abduction (2 x 10) - updated HEP to replace dowel   - Yellow theraband ER/IR isometric (2 x 10)    Performing in HEP:  - IR towel stretch (5 reps, 20 sec hold)    Home Exercises and Education Provided     Education provided:   - Progress towards goals     Written Home Exercises Provided: Patient instructed to cont prior HEP.  Exercises were reviewed and Eden was able to demonstrate them prior to the end of the session.  Eden demonstrated good  understanding of the HEP provided.   .   See EMR under Patient Instructions for exercises provided prior visit.        Assessment     Eden tolerated treatment tasks with mild reports of pain about anterior and lateral shoulder. She may benefit from KT application next session  for UT and deltoid inhibition. Plan to progress as tolerated.    Eden is progressing well towards her goals and there are no updates to goals at this time. Pt prognosis is Good. Pt will continue to benefit from skilled outpatient occupational therapy to address the deficits listed in the problem list on initial evaluation provide pt/family education and to maximize pt's level of independence in the home and community environment.     Pt's spiritual, cultural and educational needs considered and pt agreeable to plan of care and goals.    The following goals were discussed with the patient and patient is in agreement with them as to be addressed in the treatment plan.      Long Term Goals (to be met by discharge):  Date Goal Met:       1.) Eden Kuhn will demonstrate significantly improved functional performance from re-assessment as measured by a FOTO Intake score of more than 66.     Goal Status:   In progress     2.) Eden Kuhn will return to near to prior level of function for ADLs and household management reporting independence or modified independence.     Goal Status:   In progress     3. Eden Kuhn will report pain 2 out of 10 at worst to increase functional use of affected hand for work and leisure tasks.     Goal Status:   In progress      Short Term Goals (to be met by 12/31/23):  Date Goal Met:       Eden Kuhn will be independent with home exercise program with written instructions.     Goal Status:   In progress     Eden Kuhn will demonstrate 130 degrees of shoulder flex and abduction to improve functional performance in ADLs/work/leisure tasks.     Goal Status:   In progress     Eden Kuhn will demonstrate within 20 lbs of  strength compared to unaffected hand to improve functional grasp for ADLs/work/leisure tasks.     Goal Status:   In progress     Eden Kuhn will demonstrate the ability to complete ADL/IADL tasks with 4/10 pain.      Goal Status:   In progress     Eden Kuhn will be able to resume significantly greater occupational roles independently or modified as demonstrated by a FOTO Intake score of more than 56.     Goal Status:   In progress        Plan   Continue skilled occupational therapy with individualized plan of care focusing on increasing functional independence and participation in meaningful daily activities.    Updates/Grading for next session: progress as tolerated      Daria Luna, OT

## 2023-11-29 ENCOUNTER — CLINICAL SUPPORT (OUTPATIENT)
Dept: REHABILITATION | Facility: HOSPITAL | Age: 63
End: 2023-11-29
Payer: COMMERCIAL

## 2023-11-29 DIAGNOSIS — M25.611 DECREASED RIGHT SHOULDER RANGE OF MOTION: ICD-10-CM

## 2023-11-29 DIAGNOSIS — M25.511 ACUTE PAIN OF RIGHT SHOULDER: Primary | ICD-10-CM

## 2023-11-29 PROCEDURE — 97140 MANUAL THERAPY 1/> REGIONS: CPT | Mod: PO

## 2023-11-29 PROCEDURE — 97110 THERAPEUTIC EXERCISES: CPT | Mod: PO

## 2023-11-29 NOTE — PROGRESS NOTES
"  Occupational Therapy Daily Treatment Note     Date: 11/29/2023  Name: Eden Kuhn  Clinic Number: [id number]    Therapy Diagnosis:   1. Acute pain of right shoulder        2. Decreased right shoulder range of motion          Medical Diagnosis: S42.031D (ICD-10-CM) - Closed displaced fracture of acromial end of right clavicle with routine healing, subsequent encounter S42.031A (ICD-10-CM) - Traumatic closed displaced fracture of acromial end of clavicle, right, initial encounter     Referring Physician: Jenny Grajeda PA-C  Physician Orders: Eval and treat  Date of Return to MD: TBTHANIA     Date of Injury: 10/1/23  Date of Surgery: NA     Evaluation Date: 11/20/2023  Authorization Period: 11/20/23 - 12/30/23  Plan of Care Expiration: 2/12/23  Visit #/ Visits Authorized: 3 of 20  FOTO Completion: Initial eval (11/20/2023)  FOTO #2:  FOTO #3:     Time In: 12:45 pm  Time Out: 1:45 pm  Total Appointment Time (timed & untimed codes): 60 min     Precautions: Standard, NWB      Subjective     History of Current Condition: Eden Kuhn is a 63 y.o. year old Right hand dominant female who fell down 5 steps on 10/1/23 and sustained a Right distal clavicle fracture treated conservatively. Eden Kuhn is referred to Occupational Therapy for evaluation and treatment. Patient presents today with .    Pt reports: "It was really hurting for about an hour or two after last session"  Eden Kuhn was compliant with home exercise program given last session.   Response to previous treatment: tolerated well - notes increase pain by afternoon lasting about an hour  Functional change: none    Pain: 5-6/10  Location: Right shoulder    Objective     Mental status: alert, oriented x3     Observation/Appearance:   No bruising, atrophy, skin color changes or edema noted     Sensation: Patient reports tingling at times on lateral shoulder        SHOULDER RANGE OF MOTION:   Measured in degrees of active motion with " goniometer    Right  11/20/23 Left  11/20/23   Shoulder Flexion 100 155   Shoulder Abduction 95 145   Shoulder ER at side 60 85   Shoulder ER at 90* NT NT   Shoulder IR T12 T3         Manual Muscle Test:  Not tested today due to pain, limited motion, precautions           Intake Outcome Measure for FOTO Initial Evaluation Survey     Therapist reviewed FOTO scores for Eden Kuhn on 11/20/2023.   FOTO documents entered into [employer] - see Media section.     Intake Score: 46%           Treatment     Supervised modalities after being cleared for contraindications: Hot pack applied to the Right shoulder and UT for 7 minutes for increased blood flow and circulation, increased tissue extensibility, and pain management.      Eden received the following manual therapy techniques for 15 minutes:   - UT and pec minor release  - PROM shoulder ER, flexion      Eden performed therapeutic exercises for 38 min minutes including:  - Scap retraction (20 reps, 5 sec hold)  - Supine dowel AAROM flexion (2 x 10)  - Side lying abduction (2 x 10)  - Isometrics: flex/ext, abd, ER/IR (10 reps, 5 sec hold) - fair tolerance therefore recommended 5 reps, 5 sec hold in HEP  - Updated HEP with isometrics     Applied KT I band along UT for inhibition, I band about MT for facilitation, and Y band about deltoid for inhibition - instructed to remove with soap and water if irritation occurs    Performing in HEP:  - IR towel stretch (5 reps, 20 sec hold)    Home Exercises and Education Provided     Education provided:   - Progress towards goals     Written Home Exercises Provided: Patient instructed to cont prior HEP.  Exercises were reviewed and Eden was able to demonstrate them prior to the end of the session.  Eden demonstrated good  understanding of the HEP provided.   .   See EMR under Patient Instructions for exercises provided prior visit.        Assessment       Eden tolerated treatment tasks with moderate reports of  pain about anterior and lateral shoulder. She is very tender to palpation with wincing and withdrawal about anterior deltoid, and palpable thickened, dense tissue. Applied KT and will assess tolerance next session. Plan to progress as tolerated.    Eden is progressing well towards her goals and there are no updates to goals at this time. Pt prognosis is Good. Pt will continue to benefit from skilled outpatient occupational therapy to address the deficits listed in the problem list on initial evaluation provide pt/family education and to maximize pt's level of independence in the home and community environment.     Pt's spiritual, cultural and educational needs considered and pt agreeable to plan of care and goals.    The following goals were discussed with the patient and patient is in agreement with them as to be addressed in the treatment plan.      Long Term Goals (to be met by discharge):  Date Goal Met:       1.) Eden Kuhn will demonstrate significantly improved functional performance from re-assessment as measured by a FOTO Intake score of more than 66.     Goal Status:   In progress     2.) Eden Kuhn will return to near to prior level of function for ADLs and household management reporting independence or modified independence.     Goal Status:   In progress     3. Eden Kuhn will report pain 2 out of 10 at worst to increase functional use of affected hand for work and leisure tasks.     Goal Status:   In progress      Short Term Goals (to be met by 12/31/23):  Date Goal Met:       Eden Kuhn will be independent with home exercise program with written instructions.     Goal Status:   In progress     Eden Kuhn will demonstrate 130 degrees of shoulder flex and abduction to improve functional performance in ADLs/work/leisure tasks.     Goal Status:   In progress     Eden Kuhn will demonstrate within 20 lbs of  strength compared to unaffected hand to  improve functional grasp for ADLs/work/leisure tasks.     Goal Status:   In progress     Eden Kuhn will demonstrate the ability to complete ADL/IADL tasks with 4/10 pain.     Goal Status:   In progress     Eden Kuhn will be able to resume significantly greater occupational roles independently or modified as demonstrated by a FOTO Intake score of more than 56.     Goal Status:   In progress        Plan   Continue skilled occupational therapy with individualized plan of care focusing on increasing functional independence and participation in meaningful daily activities.    Updates/Grading for next session: progress as tolerated      Daria Luna, OT

## 2023-11-29 NOTE — PATIENT INSTRUCTIONS
[facility], OCCUPATIONAL THERAPY  HOME EXERCISE PROGRAM     Complete the following exercises for 10 repetitions, 2x/day:       Shoulder Flexion  Using wall for resistance, press fist into towel using light pressure.   Hold 5 seconds. Repeat 10 times. Do 2 sessions per day.         Shoulder Extension  Using wall for resistance, press back of arm into the wall/rolled up towel using   light pressure.   Hold 5 seconds. Repeat 10 times. Do 2 sessions per day.       Shoulder Abduction  Using wall for resistance, press arm into wall/rolled up towel using   light pressure, as if trying to lift elbow away from body  Hold 5 seconds. Repeat 10 times. Do 2 sessions per day.       Shoulder Internal Rotation  Using door frame for resistance, press palm of hand into wall/rolled up towel   using light pressure. Keep elbow in at side.   Hold 5 seconds. Repeat 10 times. Do 2 sessions per day.       Shoulder External Rotation  Using wall to provide resistance, and keeping arm at side,   press back of hand into ball using light pressure.   Keep elbow at side - as if trying to rotate arm out  Hold 5 seconds. Repeat 10 times. Do 2 sessions per day.    Therapist: WADE Barth/L     Copyright © [facility]. All rights reserved.

## 2023-12-04 ENCOUNTER — CLINICAL SUPPORT (OUTPATIENT)
Dept: REHABILITATION | Facility: HOSPITAL | Age: 63
End: 2023-12-04
Payer: COMMERCIAL

## 2023-12-04 DIAGNOSIS — M25.611 DECREASED RIGHT SHOULDER RANGE OF MOTION: ICD-10-CM

## 2023-12-04 DIAGNOSIS — M25.511 ACUTE PAIN OF RIGHT SHOULDER: Primary | ICD-10-CM

## 2023-12-04 PROCEDURE — 97110 THERAPEUTIC EXERCISES: CPT | Mod: PO

## 2023-12-04 PROCEDURE — 97140 MANUAL THERAPY 1/> REGIONS: CPT | Mod: PO

## 2023-12-04 NOTE — PROGRESS NOTES
"  Occupational Therapy Daily Treatment Note     Date: 12/4/2023  Name: Eden Kuhn  Clinic Number: [id number]    Therapy Diagnosis:   1. Acute pain of right shoulder        2. Decreased right shoulder range of motion            Medical Diagnosis: S42.031D (ICD-10-CM) - Closed displaced fracture of acromial end of right clavicle with routine healing, subsequent encounter S42.031A (ICD-10-CM) - Traumatic closed displaced fracture of acromial end of clavicle, right, initial encounter     Referring Physician: Jenny Grajeda PA-C  Physician Orders: Eval and treat  Date of Return to MD: TBTHANIA     Date of Injury: 10/1/23  Date of Surgery: NA     Evaluation Date: 11/20/2023  Authorization Period: 11/20/23 - 12/30/23  Plan of Care Expiration: 2/12/23  Visit #/ Visits Authorized: 4 of 20  FOTO Completion: Initial eval (11/20/2023)  FOTO #2:  FOTO #3:     Time In: 12:56 pm  Time Out: 1:43 pm  Total Appointment Time (timed & untimed codes): 47 min     Precautions: Standard, NWB      Subjective     History of Current Condition: Eden Kuhn is a 63 y.o. year old Right hand dominant female who fell down 5 steps on 10/1/23 and sustained a Right distal clavicle fracture treated conservatively. Eden Kuhn is referred to Occupational Therapy for evaluation and treatment. Patient presents today with .    Pt reports: "I feel like my motion has been improving but it's really hurting more and I haven't been able to sleep much"  Eden Kuhn was compliant with home exercise program given last session.   Response to previous treatment: tolerated well - notes increase pain by afternoon lasting about an hour  Functional change: none    Pain: 5-6/10  Location: Right shoulder    Objective     Mental status: alert, oriented x3     Observation/Appearance:   No bruising, atrophy, skin color changes or edema noted     Sensation: Patient reports tingling at times on lateral shoulder        SHOULDER RANGE OF MOTION: "   Measured in degrees of active motion with goniometer    Right  11/20/23 Left  11/20/23   Shoulder Flexion 100 155   Shoulder Abduction 95 145   Shoulder ER at side 60 85   Shoulder ER at 90* NT NT   Shoulder IR T12 T3         Manual Muscle Test:  Not tested today due to pain, limited motion, precautions           Intake Outcome Measure for FOTO Initial Evaluation Survey     Therapist reviewed FOTO scores for Eden Kuhn on 11/20/2023.   FOTO documents entered into [employer] - see Media section.     Intake Score: 46%           Treatment     Supervised modalities after being cleared for contraindications: Hot pack applied to the Right shoulder and UT for 8 minutes for increased blood flow and circulation, increased tissue extensibility, and pain management.      Eden received the following manual therapy techniques for 24 minutes:   - Scap mobs in all planes followed by active scap retract with therapist applying light resistance  - Posterior GH approximation with isometrics ER/IR at 90*, sh add, horz abd/add (10 ea)    Eden performed therapeutic exercises for 15 min minutes including:  - Supine dowel AAROM flexion (2 x 10)  - Yellow theraband scap retract/ext (2 x 10)  - Applied KT I band in thumb webspace with 75% tension      KT I band along UT for inhibition, I band about MT for facilitation, and Y band about deltoid for inhibition - pt declined KT application today, would like to wait until Wednesday (next session)    Performing in HEP:  - IR towel stretch (5 reps, 20 sec hold)  - Isometrics: flex/ext, abd, ER/IR (10 reps, 5 sec hold)     Home Exercises and Education Provided     Education provided:   - Progress towards goals     Written Home Exercises Provided: Patient instructed to cont prior HEP.  Exercises were reviewed and Eden was able to demonstrate them prior to the end of the session.  Eden demonstrated good  understanding of the HEP provided.   .   See EMR under Patient Instructions  for exercises provided prior visit.        Assessment     Eden tolerated treatment tasks with moderate reports of pain about anterior and lateral shoulder and trigger point in rhomboids. She had fair tolerance for isometrics, fatiguing after a few reps with increase in pain. Instructed to continue to perform HEP but pain free and with less intensity. Plan to progress as tolerated.    Eden is progressing well towards her goals and there are no updates to goals at this time. Pt prognosis is Good. Pt will continue to benefit from skilled outpatient occupational therapy to address the deficits listed in the problem list on initial evaluation provide pt/family education and to maximize pt's level of independence in the home and community environment.     Pt's spiritual, cultural and educational needs considered and pt agreeable to plan of care and goals.    The following goals were discussed with the patient and patient is in agreement with them as to be addressed in the treatment plan.      Long Term Goals (to be met by discharge):  Date Goal Met:       1.) Eden Kuhn will demonstrate significantly improved functional performance from re-assessment as measured by a FOTO Intake score of more than 66.     Goal Status:   In progress     2.) Eden Kuhn will return to near to prior level of function for ADLs and household management reporting independence or modified independence.     Goal Status:   In progress     3. Eden Kuhn will report pain 2 out of 10 at worst to increase functional use of affected hand for work and leisure tasks.     Goal Status:   In progress      Short Term Goals (to be met by 12/31/23):  Date Goal Met:       Eden Kuhn will be independent with home exercise program with written instructions.     Goal Status:   In progress     Eden Kuhn will demonstrate 130 degrees of shoulder flex and abduction to improve functional performance in ADLs/work/leisure  tasks.     Goal Status:   In progress     Eden Kuhn will demonstrate within 20 lbs of  strength compared to unaffected hand to improve functional grasp for ADLs/work/leisure tasks.     Goal Status:   In progress     Eden Kuhn will demonstrate the ability to complete ADL/IADL tasks with 4/10 pain.     Goal Status:   In progress     Eden Kuhn will be able to resume significantly greater occupational roles independently or modified as demonstrated by a FOTO Intake score of more than 56.     Goal Status:   In progress        Plan   Continue skilled occupational therapy with individualized plan of care focusing on increasing functional independence and participation in meaningful daily activities.    Updates/Grading for next session: progress as tolerated      Daria Luna OT

## 2023-12-06 ENCOUNTER — TELEPHONE (OUTPATIENT)
Dept: NEUROLOGY | Facility: CLINIC | Age: 63
End: 2023-12-06
Payer: COMMERCIAL

## 2023-12-06 ENCOUNTER — CLINICAL SUPPORT (OUTPATIENT)
Dept: REHABILITATION | Facility: HOSPITAL | Age: 63
End: 2023-12-06
Payer: COMMERCIAL

## 2023-12-06 DIAGNOSIS — M25.611 DECREASED RIGHT SHOULDER RANGE OF MOTION: ICD-10-CM

## 2023-12-06 DIAGNOSIS — M25.511 ACUTE PAIN OF RIGHT SHOULDER: Primary | ICD-10-CM

## 2023-12-06 PROCEDURE — 97140 MANUAL THERAPY 1/> REGIONS: CPT | Mod: PO

## 2023-12-06 PROCEDURE — 97110 THERAPEUTIC EXERCISES: CPT | Mod: PO

## 2023-12-06 NOTE — TELEPHONE ENCOUNTER
Called Pt to reschedule her Jan 8 appt since it was scheduled incorrectly. Provider asked me to ask Pt if Pt is having concussion symptoms from her fall as unclear from the notes. Appt has been canceled. I was unable to speak with her but left a vm.

## 2023-12-06 NOTE — PROGRESS NOTES
"  Occupational Therapy Daily Treatment Note     Date: 12/6/2023  Name: Eden Kuhn  Clinic Number: [id number]    Therapy Diagnosis:   1. Acute pain of right shoulder        2. Decreased right shoulder range of motion            Medical Diagnosis: S42.031D (ICD-10-CM) - Closed displaced fracture of acromial end of right clavicle with routine healing, subsequent encounter S42.031A (ICD-10-CM) - Traumatic closed displaced fracture of acromial end of clavicle, right, initial encounter     Referring Physician: Jenny Grajeda PA-C  Physician Orders: Eval and treat  Date of Return to MD: TBTHANIA     Date of Injury: 10/1/23  Date of Surgery: NA     Evaluation Date: 11/20/2023  Authorization Period: 11/20/23 - 12/30/23  Plan of Care Expiration: 2/12/23  Visit #/ Visits Authorized: 5 of 20  FOTO Completion: Initial eval (11/20/2023)  FOTO #2:  FOTO #3:     Time In: 12:40 pm  Time Out: 1:32 pm  Total Appointment Time (timed & untimed codes): 52 min     Precautions: Standard, NWB      Subjective     History of Current Condition: Eden Kunh is a 63 y.o. year old Right hand dominant female who fell down 5 steps on 10/1/23 and sustained a Right distal clavicle fracture treated conservatively. Eden Kuhn is referred to Occupational Therapy for evaluation and treatment. Patient presents today with .    Pt reports: "It didn't hurt as bad after last time. That KT really helped. It's really hard for me to reach across my body to my opposite shoulder, can we work on that?"  Eden Kuhn was compliant with home exercise program given last session.   Response to previous treatment: tolerated well - notes increase pain by afternoon lasting about an hour  Functional change: none    Pain: 4-5/10  Location: Right shoulder    Objective     Mental status: alert, oriented x3     Observation/Appearance:   No bruising, atrophy, skin color changes or edema noted     Sensation: Patient reports tingling at times on " lateral shoulder        SHOULDER RANGE OF MOTION:   Measured in degrees of active motion with goniometer    Right  11/20/23 Right  12/6/23 Left  11/20/23   Shoulder Flexion 100 130 155   Shoulder Abduction 95 105 145   Shoulder ER at side 60 85 85   Shoulder ER at 90* NT 75 90   Shoulder IR T12 T3 T3         Manual Muscle Test:   Right  12/6/23 Left  12/6/23        Shoulder Flexion  Deltoid/Bicep 3/5* 5/5   Shoulder ABDuction  Supraspinatus/Deltoid 3/5* 5/5   Shoulder ER @ side  Infraspinatus 4/5 5/5   Shoulder ER @ 90  Teres minor NT    Shoulder IR  Subscapularis 4/5 5/5   Elbow Flexion 5/5 5/5   Elbow Extension 4/5* 5/5     *with pain        Intake Outcome Measure for FOTO Initial Evaluation Survey     Therapist reviewed FOTO scores for Eden Kuhn on 11/20/2023.   FOTO documents entered into [employer] - see Media section.     Intake Score: 46%           Treatment     Supervised modalities after being cleared for contraindications: Hot pack applied to the Right shoulder for 8 minutes for increased blood flow and circulation, increased tissue extensibility, and pain management.      Eden received the following manual therapy techniques for 14 minutes:   - Posterior glides  - Posterior GH approximation with isometrics scap retract, horz abd/add      Eden performed therapeutic exercises for 30 min minutes including:  - Reassessment of ROM, assessment of MMT  - Prone scap retract with row (2 x 10)  - Prone IR (2 x 10)  - Red theraband scap retract/ext (2 x 10)  - Crossbody stretch to opposite hip - instructed to perform in HEP in place of towel stretch, beginning at hip level and working up towards chest level as able    Applied KT web strip (earline cross, KONRAD taping) about lateral shoulder, I band in thumb webspace with 75% tension, I band along UT for inhibition, I band about MT for facilitation, and I band about posterior deltoid for inhibition      Performing in HEP:  - IR towel stretch (5 reps, 20 sec  hold)  - Isometrics: flex/ext, abd, ER/IR (10 reps, 5 sec hold)     Home Exercises and Education Provided     Education provided:   - Progress towards goals     Written Home Exercises Provided: yes. Prone IR, and red theraband scap retract  Exercises were reviewed and Eden was able to demonstrate them prior to the end of the session.  Eden demonstrated good  understanding of the HEP provided.   .   See EMR under Patient Instructions for exercises provided prior visit.        Assessment     Eden tolerated treatment tasks with moderate reports of pain about anterior and lateral shoulder. Noting improved pain following KT but still aggravated about lateral anterior prox hum with cross body. Much improved ROM in all planes since initial eval. Updated goals after assessment of MMT today. Plan to progress as tolerated.    Eden is progressing well towards her goals and there are no updates to goals at this time. Pt prognosis is Good. Pt will continue to benefit from skilled outpatient occupational therapy to address the deficits listed in the problem list on initial evaluation provide pt/family education and to maximize pt's level of independence in the home and community environment.     Pt's spiritual, cultural and educational needs considered and pt agreeable to plan of care and goals.    The following goals were discussed with the patient and patient is in agreement with them as to be addressed in the treatment plan.      Long Term Goals (to be met by discharge):  Date Goal Met:       1.) Eden Kuhn will demonstrate significantly improved functional performance from re-assessment as measured by a FOTO Intake score of more than 66.     Goal Status:   In progress     2.) Eden Kuhn will return to near to prior level of function for ADLs and household management reporting independence or modified independence.     Goal Status:   In progress     3. Eden Kuhn will report pain 2 out  of 10 at worst to increase functional use of affected hand for work and leisure tasks.     Goal Status:   In progress      UPDATED Short Term Goals (to be met by 12/31/23):  Date Goal Met:       Eden Kuhn will be independent with home exercise program with written instructions.     Goal Status:   In progress     Eden Kuhn will demonstrate 130 degrees of shoulder flex and abduction to improve functional performance in ADLs/work/leisure tasks.    12/6/23 Goal Status:  Met     Eden Kuhn will demonstrate 4/5 MMT with no pain for shoulder abd and flexion to improve use of RUE for ADLs/work/leisure tasks.     Goal Status:   In progress     Eden Kuhn will demonstrate the ability to complete ADL/IADL tasks with 4/10 pain.     Goal Status:   In progress     Eden Kuhn will be able to resume significantly greater occupational roles independently or modified as demonstrated by a FOTO Intake score of more than 56.     Goal Status:   In progress        Plan   Continue skilled occupational therapy with individualized plan of care focusing on increasing functional independence and participation in meaningful daily activities.    Updates/Grading for next session: progress as tolerated      Daria Luna, OT

## 2023-12-08 ENCOUNTER — PATIENT MESSAGE (OUTPATIENT)
Dept: ORTHOPEDICS | Facility: CLINIC | Age: 63
End: 2023-12-08
Payer: COMMERCIAL

## 2023-12-08 DIAGNOSIS — S42.031A: Primary | ICD-10-CM

## 2023-12-08 RX ORDER — HYDROCODONE BITARTRATE AND ACETAMINOPHEN 10; 325 MG/1; MG/1
1 TABLET ORAL EVERY 6 HOURS PRN
Qty: 21 TABLET | Refills: 0 | Status: SHIPPED | OUTPATIENT
Start: 2023-12-08 | End: 2023-12-15

## 2023-12-13 ENCOUNTER — CLINICAL SUPPORT (OUTPATIENT)
Dept: REHABILITATION | Facility: HOSPITAL | Age: 63
End: 2023-12-13
Payer: COMMERCIAL

## 2023-12-13 DIAGNOSIS — M25.511 ACUTE PAIN OF RIGHT SHOULDER: Primary | ICD-10-CM

## 2023-12-13 DIAGNOSIS — M25.611 DECREASED RIGHT SHOULDER RANGE OF MOTION: ICD-10-CM

## 2023-12-13 PROCEDURE — 97110 THERAPEUTIC EXERCISES: CPT | Mod: PO

## 2023-12-13 PROCEDURE — 97140 MANUAL THERAPY 1/> REGIONS: CPT | Mod: PO

## 2023-12-13 NOTE — PROGRESS NOTES
"  Occupational Therapy Daily Treatment Note     Date: 12/13/2023  Name: Eden Kuhn  Clinic Number: [id number]    Therapy Diagnosis:   1. Acute pain of right shoulder        2. Decreased right shoulder range of motion            Medical Diagnosis: S42.031D (ICD-10-CM) - Closed displaced fracture of acromial end of right clavicle with routine healing, subsequent encounter S42.031A (ICD-10-CM) - Traumatic closed displaced fracture of acromial end of clavicle, right, initial encounter     Referring Physician: Jenny Grajeda PA-C  Physician Orders: Eval and treat  Date of Return to MD: TBTHANIA     Date of Injury: 10/1/23  Date of Surgery: NA     Evaluation Date: 11/20/2023  Authorization Period: 11/20/23 - 12/30/23  Plan of Care Expiration: 2/12/23  Visit #/ Visits Authorized: 6 of 20  FOTO Completion: Initial eval (11/20/2023)  FOTO #2:  FOTO #3:     Time In: 12:45 pm  Time Out: 1:45 pm  Total Appointment Time (timed & untimed codes): 60 min     Precautions: Standard, NWB      Subjective     History of Current Condition: Eden Kuhn is a 63 y.o. year old Right hand dominant female who fell down 5 steps on 10/1/23 and sustained a Right distal clavicle fracture treated conservatively. Eden Kuhn is referred to Occupational Therapy for evaluation and treatment. Patient presents today with .    Pt reports: "I had to miss my last appt because of a sinus infection"  Eden Kuhn was compliant with home exercise program given last session.   Response to previous treatment: tolerated well - notes increase pain by afternoon lasting about an hour  Functional change: increase ROM about RUE    Pain: 4-5/10  Location: Right shoulder    Objective     Mental status: alert, oriented x3     Observation/Appearance:   No bruising, atrophy, skin color changes or edema noted     Sensation: Patient reports tingling at times on lateral shoulder        SHOULDER RANGE OF MOTION:   Measured in degrees of active " motion with goniometer    Right  11/20/23 Right  12/6/23 Left  11/20/23   Shoulder Flexion 100 130 155   Shoulder Abduction 95 105 145   Shoulder ER at side 60 85 85   Shoulder ER at 90* NT 75 90   Shoulder IR T12 T3 T3         Manual Muscle Test:   Right  12/6/23 Left  12/6/23        Shoulder Flexion  Deltoid/Bicep 3/5* 5/5   Shoulder ABDuction  Supraspinatus/Deltoid 3/5* 5/5   Shoulder ER @ side  Infraspinatus 4/5 5/5   Shoulder ER @ 90  Teres minor NT    Shoulder IR  Subscapularis 4/5 5/5   Elbow Flexion 5/5 5/5   Elbow Extension 4/5* 5/5     *with pain        Intake Outcome Measure for FOTO Initial Evaluation Survey     Therapist reviewed FOTO scores for Eden Kuhn on 11/20/2023.   FOTO documents entered into [employer] - see Media section.     Intake Score: 46%           Treatment     Supervised modalities after being cleared for contraindications: Hot pack applied to the Right shoulder for 7 minutes for increased blood flow and circulation, increased tissue extensibility, and pain management.      Eden received the following manual therapy techniques for 10 minutes:   - Posterior glides  - STM about pec major and deltoid      Eden performed therapeutic exercises for 43 min minutes including:  - Posterior GH approximation with yellow theraband isometric in all directions (4 x 1 min)  - Supine 3# dowel shoulder flexion (2 x 10)  - Prone scap retract with row (3 x 10) - verbal and tactile cueing  - Prone ER/IR at 90* (3 x 10) - verbal and tactile cueing  - Review of anatomy and biomechanics of shoulder      Applied KT I band about 1st web space - did not apply: web strip (earline cross, KONRAD taping) about lateral shoulder, I band along UT for inhibition, I band about MT for facilitation, and I band about posterior deltoid for inhibition      Performing in HEP:  - Crossbody stretch to opposite hip - beginning at hip level and working up towards chest level as able  - Isometrics: flex/ext, abd, ER/IR (10  reps, 5 sec hold)   - Red theraband scap retract    Home Exercises and Education Provided     Education provided:   - Progress towards goals     Written Home Exercises Provided: yes. Prone ER/IR, and red theraband scap retract  Exercises were reviewed and Eden was able to demonstrate them prior to the end of the session.  Eden demonstrated good  understanding of the HEP provided.   .   See EMR under Patient Instructions for exercises provided prior visit.        Assessment     Eden tolerated treatment tasks with moderate reports of pain about anterior and lateral shoulder, but thickened tissue and tenderness about this area is much improved since initial eval. She notes relief with KT application but wants to wait until next week for re-application. She shows good understanding of HEP. Plan to progress as tolerated.    Eden is progressing well towards her goals and there are no updates to goals at this time. Pt prognosis is Good. Pt will continue to benefit from skilled outpatient occupational therapy to address the deficits listed in the problem list on initial evaluation provide pt/family education and to maximize pt's level of independence in the home and community environment.     Pt's spiritual, cultural and educational needs considered and pt agreeable to plan of care and goals.    The following goals were discussed with the patient and patient is in agreement with them as to be addressed in the treatment plan.      Long Term Goals (to be met by discharge):  Date Goal Met:       1.) Eden Kuhn will demonstrate significantly improved functional performance from re-assessment as measured by a FOTO Intake score of more than 66.     Goal Status:   In progress     2.) Eden Kuhn will return to near to prior level of function for ADLs and household management reporting independence or modified independence.     Goal Status:   In progress     3. Eden Kuhn will report pain 2  out of 10 at worst to increase functional use of affected hand for work and leisure tasks.     Goal Status:   In progress      UPDATED Short Term Goals (to be met by 12/31/23):  Date Goal Met:       Eden Kuhn will be independent with home exercise program with written instructions.     Goal Status:   In progress     Eden Kuhn will demonstrate 130 degrees of shoulder flex and abduction to improve functional performance in ADLs/work/leisure tasks.    12/6/23 Goal Status:  Met     Eden Kuhn will demonstrate 4/5 MMT with no pain for shoulder abd and flexion to improve use of RUE for ADLs/work/leisure tasks.     Goal Status:   In progress     Eden Kuhn will demonstrate the ability to complete ADL/IADL tasks with 4/10 pain.     Goal Status:   In progress     Eden Kuhn will be able to resume significantly greater occupational roles independently or modified as demonstrated by a FOTO Intake score of more than 56.     Goal Status:   In progress        Plan   Continue skilled occupational therapy with individualized plan of care focusing on increasing functional independence and participation in meaningful daily activities.    Updates/Grading for next session: progress as tolerated      Daria Luna, OT

## 2023-12-20 ENCOUNTER — CLINICAL SUPPORT (OUTPATIENT)
Dept: REHABILITATION | Facility: HOSPITAL | Age: 63
End: 2023-12-20
Payer: COMMERCIAL

## 2023-12-20 DIAGNOSIS — M25.611 DECREASED RIGHT SHOULDER RANGE OF MOTION: ICD-10-CM

## 2023-12-20 DIAGNOSIS — M25.511 ACUTE PAIN OF RIGHT SHOULDER: Primary | ICD-10-CM

## 2023-12-20 PROCEDURE — 97110 THERAPEUTIC EXERCISES: CPT | Mod: PO

## 2023-12-20 PROCEDURE — 97140 MANUAL THERAPY 1/> REGIONS: CPT | Mod: PO

## 2023-12-20 NOTE — PROGRESS NOTES
"  Occupational Therapy Daily Treatment Note     Date: 12/20/2023  Name: Eden Kuhn  Clinic Number: [id number]    Therapy Diagnosis:   1. Acute pain of right shoulder        2. Decreased right shoulder range of motion            Medical Diagnosis: S42.031D (ICD-10-CM) - Closed displaced fracture of acromial end of right clavicle with routine healing, subsequent encounter S42.031A (ICD-10-CM) - Traumatic closed displaced fracture of acromial end of clavicle, right, initial encounter     Referring Physician: Jenny Grajeda PA-C  Physician Orders: Eval and treat  Date of Return to MD: TBTHANIA     Date of Injury: 10/1/23  Date of Surgery: NA     Evaluation Date: 11/20/2023  Authorization Period: 11/20/23 - 12/30/23  Plan of Care Expiration: 2/12/23  Visit #/ Visits Authorized: 8 of 20  FOTO Completion: Initial eval (11/20/2023)  FOTO #2: 12/20/23  FOTO #3:     Time In: 12:45 pm  Time Out: 1:40 pm  Total Appointment Time (timed & untimed codes): 55 min     Precautions: Standard      Subjective     History of Current Condition: Eden Kuhn is a 63 y.o. year old Right hand dominant female who fell down 5 steps on 10/1/23 and sustained a Right distal clavicle fracture treated conservatively. Eden Kuhn is referred to Occupational Therapy for evaluation and treatment. Patient presents today with .    Pt reports: "I did a lot on Sunday and it was really hurting" reporting she had to cancel last appt due to increase in pain levels from moving boxes over the weekend  Eden Kuhn was compliant with home exercise program given last session.   Response to previous treatment: tolerated well  Functional change: increase ROM about RUE and ability to reach overhead    Pain: 4-5/10  Location: Right shoulder    Objective     Mental status: alert, oriented x3     Observation/Appearance:   No bruising, atrophy, skin color changes or edema noted     Sensation: Patient reports tingling at times on lateral " shoulder        SHOULDER RANGE OF MOTION:   Measured in degrees of active motion with goniometer    Right  11/20/23 Right  12/6/23 Left  11/20/23   Shoulder Flexion 100 130 155   Shoulder Abduction 95 105 145   Shoulder ER at side 60 85 85   Shoulder ER at 90* NT 75 90   Shoulder IR T12 T3 T3         Manual Muscle Test:   Right  12/6/23 Left  12/6/23        Shoulder Flexion  Deltoid/Bicep 3/5* 5/5   Shoulder ABDuction  Supraspinatus/Deltoid 3/5* 5/5   Shoulder ER @ side  Infraspinatus 4/5 5/5   Shoulder ER @ 90  Teres minor NT    Shoulder IR  Subscapularis 4/5 5/5   Elbow Flexion 5/5 5/5   Elbow Extension 4/5* 5/5     *with pain        Intake Outcome Measure for FOTO Initial Evaluation Survey     Therapist reviewed FOTO scores for Eden YAO GAYATRIJeff on 11/20/2023.   FOTO documents entered into [employer] - see Media section.     Intake Score: 46%  12/20/23: 50%           Treatment     Supervised modalities after being cleared for contraindications: Hot pack applied to the Right shoulder for 10 minutes for increased blood flow and circulation, increased tissue extensibility, and pain management.      Eden received the following manual therapy techniques for 10 minutes:   - Posterior glides  - STM about pec major and deltoid      Eden performed therapeutic exercises for 35 min minutes including:  - Reassessment of FOTO  - Applied KT web strip about posterior shoulder and middle deltoid  - Updated and reviewed HEP to include: Y wall slides lower trap, prone IR at 90*, red theraband scap retract, ER/IR and horizontal abduction  - Lower trap on elevated plynth      Home Exercises and Education Provided     Education provided:   - Progress towards goals     Written Home Exercises Provided: yes. Prone IR, and red theraband scap retract, ER/IR and horizontal abduction  Exercises were reviewed and Eden was able to demonstrate them prior to the end of the session.  Eden demonstrated good  understanding of the HEP  provided.   .   See EMR under Patient Instructions for exercises provided prior visit.        Assessment     Eden reports improvement in ROM but continued difficulty with moderate to heavy resistive tasks due to pain and limited strength. She has been complaint with HEP and shows excellent understanding of body mechanics/anatomy and progression. She prefers at this time to continue independently with HEP until follow up with MD despite current limitations. She knows to call with any questions or concerns in the meanwhile.    Eden is progressing well towards her goals and there are no updates to goals at this time. Pt prognosis is Good. Pt will continue to benefit from skilled outpatient occupational therapy to address the deficits listed in the problem list on initial evaluation provide pt/family education and to maximize pt's level of independence in the home and community environment.     Pt's spiritual, cultural and educational needs considered and pt agreeable to plan of care and goals.    The following goals were discussed with the patient and patient is in agreement with them as to be addressed in the treatment plan.      Long Term Goals (to be met by discharge):  Date Goal Met:       1.) Eden Kuhn will demonstrate significantly improved functional performance from re-assessment as measured by a FOTO Intake score of more than 66.     Goal Status:   In progress     2.) Eden Kuhn will return to near to prior level of function for ADLs and household management reporting independence or modified independence.     Goal Status:   In progress     3. Eden Kuhn will report pain 2 out of 10 at worst to increase functional use of affected hand for work and leisure tasks.     Goal Status:   In progress      UPDATED Short Term Goals (to be met by 12/31/23):  Date Goal Met:       Eden Kuhn will be independent with home exercise program with written instructions.    12/20/23 Goal  Status:  Met     Eden Kuhn will demonstrate 130 degrees of shoulder flex and abduction to improve functional performance in ADLs/work/leisure tasks.    12/6/23 Goal Status:  Met     Eden Kuhn will demonstrate 4/5 MMT with no pain for shoulder abd and flexion to improve use of RUE for ADLs/work/leisure tasks.     Goal Status:   In progress     Eden Kuhn will demonstrate the ability to complete ADL/IADL tasks with 4/10 pain.     Goal Status:   In progress     Eden Kuhn will be able to resume significantly greater occupational roles independently or modified as demonstrated by a FOTO Intake score of more than 56.     Goal Status:   In progress        Plan   Continue skilled occupational therapy with individualized plan of care focusing on increasing functional independence and participation in meaningful daily activities.    Updates/Grading for next session: progress as tolerated      Daria Luna OT

## 2023-12-26 ENCOUNTER — TELEPHONE (OUTPATIENT)
Dept: ORTHOPEDICS | Facility: CLINIC | Age: 63
End: 2023-12-26
Payer: COMMERCIAL

## 2023-12-26 NOTE — TELEPHONE ENCOUNTER
----- Message from Cruz Flynn sent at 12/26/2023 10:36 AM CST -----  Regarding: Robina cancelled appt  Contact: @ [phone]  Pt is calling to speak with someone in the office to r/s appt that was mami for today. I offered the pt an appt on 1/2/2024 @ 7:30am but pt stated that was too early in the morning. Pt is asking for an appt for this Friday or the first week of January in the late morning. Please call to discuss further. Thanks.

## 2023-12-26 NOTE — PROGRESS NOTES
Subjective:     Patient ID: Eden Kuhn is a 63 y.o. female.    Chief Complaint: Follow-up (Right clavicle)    HPI  Patient is a 63 year old female who presented to the ED on 10/01/23 s/p fall down 5 steps and landing on her right shoulder. Ed reports that this was secondary to narcotic withdrawal.   RADS: Mildly displaced fracture of the right distal clavicle  Patient treated in a sling. She stated that she is doing no so great. She reports that her pain is not controlled. She takes Norco 10 at baseline and has not been able to get filled because she cannot find pharmacy which has it. She took tyelnol arthritis and oxycodone 5mg without any relief. She stated that she has tried to use the sling but it is uncomfortable.     10/11/23: Over all better, but continued pain. She has used the sling as needed. She has been NWB. She is taking pain medication. No numbness or tingling.     11/0923: Over all doing better. She is attending PT which is helping.  No numbness or tingling.     Review of Systems   Constitutional: Negative for chills and fever.   Cardiovascular:  Negative for chest pain.   Respiratory:  Negative for cough and shortness of breath.    Skin:  Negative for color change, dry skin, itching, nail changes, poor wound healing and rash.   Musculoskeletal:  Positive for falls.   Neurological:  Negative for dizziness.   Psychiatric/Behavioral:  Negative for altered mental status. The patient is not nervous/anxious.    All other systems reviewed and are negative.      Objective:       General    Constitutional: She is oriented to person, place, and time. She appears well-developed and well-nourished. No distress.   HENT:   Head: Atraumatic.   Eyes: Conjunctivae are normal.   Cardiovascular:  Normal rate.            Pulmonary/Chest: Effort normal.   Neurological: She is alert and oriented to person, place, and time.   Psychiatric: She has a normal mood and affect. Her behavior is normal.         Right  Shoulder Exam     Inspection/Observation   Bruising: present    Tenderness   The patient is tender to palpation of the clavicle.    Range of Motion   Active abduction:  abnormal   Passive abduction:  abnormal   Forward Flexion:  abnormal   External Rotation 0 degrees:  abnormal   External Rotation 90 degrees: abnormal  Internal rotation 0 degrees:  abnormal   Internal rotation 90 degrees:  abnormal       Physical Exam  Constitutional:       General: She is not in acute distress.     Appearance: She is well-developed and well-nourished. She is not diaphoretic.   HENT:      Head: Atraumatic.   Eyes:      Conjunctiva/sclera: Conjunctivae normal.   Cardiovascular:      Rate and Rhythm: Normal rate.   Pulmonary:      Effort: Pulmonary effort is normal.   Neurological:      Mental Status: She is alert and oriented to person, place, and time.   Psychiatric:         Mood and Affect: Mood and affect normal.         Behavior: Behavior normal.         RADS: reviewed by myself .   Mildly displaced fracture of the right distal clavicle., possibly minimally comminuted  Assessment:     Encounter Diagnoses   Name Primary?    Closed displaced fracture of acromial end of right clavicle with routine healing, subsequent encounter Yes    Traumatic closed displaced fracture of acromial end of clavicle, right, initial encounter        Plan:      Per  at this time plan is for non-operative management  Will wean sling and NWB. Ok to remove for hygine and range of motion of finger wrist and elbow.   NWB  Order placed for PT  Pain medication: multimodal  Return to clinic in 6 week at that time x-ray of her clavicle is needed,

## 2024-01-02 NOTE — PROGRESS NOTES
Subjective:     Patient ID: Eden Kuhn is a 63 y.o. female.    Chief Complaint: No chief complaint on file.    HPI  Patient is a 63 year old female who presented to the ED on 10/01/23 s/p fall down 5 steps and landing on her right shoulder. Ed reports that this was secondary to narcotic withdrawal.   RADS: Mildly displaced fracture of the right distal clavicle  Patient treated in a sling. She stated that she is doing no so great. She reports that her pain is not controlled. She takes Norco 10 at baseline and has not been able to get filled because she cannot find pharmacy which has it. She took tyelnol arthritis and oxycodone 5mg without any relief. She stated that she has tried to use the sling but it is uncomfortable.     10/11/23: Over all better, but continued pain. She has used the sling as needed. She has been NWB. She is taking pain medication. No numbness or tingling.     11/0923: Over all doing better. She is attending PT which is helping.  No numbness or tingling.     01/04/23: Able to do more, but has continued pain. She is describing a shooting pain down her arm at times from her neck area and other times from her elbow. She has a soreness in her anterior chest. She is taking pain medication more than her baseline which is 2 Norco 10 per day at times she is in need of 3. She has d/c PT and is working on her own.   Patient is concerned that she still has not been able to be see by neurology. She reports she had a head injury when she fell. She was to follow up with them but has had trouble with the appointments. She stated that she has noticed some memory issues as well as increased in headaches. She also reports some shakiness in her right arm and issues with fine motor movements.  She is eager to follow up with neurology and is concerned that they keep moving her appointments.     Review of Systems   Constitutional: Negative for chills and fever.   Cardiovascular:  Negative for chest pain.    Respiratory:  Negative for cough and shortness of breath.    Skin:  Negative for color change, dry skin, itching, nail changes, poor wound healing and rash.   Musculoskeletal:  Positive for falls.   Neurological:  Negative for dizziness.   Psychiatric/Behavioral:  Negative for altered mental status. The patient is not nervous/anxious.    All other systems reviewed and are negative.      Objective:       General    Constitutional: She is oriented to person, place, and time. She appears well-developed and well-nourished. No distress.   HENT:   Head: Atraumatic.   Eyes: Conjunctivae are normal.   Cardiovascular:  Normal rate.            Pulmonary/Chest: Effort normal.   Neurological: She is alert and oriented to person, place, and time.   Psychiatric: She has a normal mood and affect. Her behavior is normal.             Right Hand/Wrist Exam     Range of Motion   The patient has normal right hand/wrist ROM.    Other     Neuorologic Exam    Ulnar Distribution: abnormal      Right Elbow Exam     Range of Motion   The patient has normal right elbow ROM.    Tests   Tinel's sign (cubital tunnel): positive      Right Shoulder Exam     Range of Motion   Active abduction:  normal   Forward Flexion:  120 abnormal   External Rotation 0 degrees:  abnormal   External Rotation 90 degrees: abnormal  Internal rotation 0 degrees:  abnormal   Internal rotation 90 degrees:  abnormal     Tests & Signs   Drop arm: negative  Benavidez test: negative  Impingement: negative    Vascular Exam     Right Pulses      Radial:                    2+        Physical Exam  Constitutional:       General: She is not in acute distress.     Appearance: She is well-developed and well-nourished. She is not diaphoretic.   HENT:      Head: Atraumatic.   Eyes:      Conjunctiva/sclera: Conjunctivae normal.   Cardiovascular:      Rate and Rhythm: Normal rate.      Pulses:           Radial pulses are 2+ on the right side.   Pulmonary:      Effort: Pulmonary  effort is normal.   Musculoskeletal:      Right hand: Decreased sensation of the ulnar distribution.   Neurological:      Mental Status: She is alert and oriented to person, place, and time.   Psychiatric:         Mood and Affect: Mood and affect normal.         Behavior: Behavior normal.         RADS: reviewed by myself .   Healing fracture of the distal and of the right clavicle identified in a satisfactory and unchanged position as compared to the previous study   Assessment:     Encounter Diagnosis   Name Primary?    Traumatic closed displaced fracture of acromial end of clavicle, right, initial encounter Yes       Plan:      Per  at this time plan is for non-operative management  ROMAT, ok to slowly begin weightbearing.   Order placed for PT  Pain medication: multimodal  Ordered EMG.   Has appointment with neurology asked for me to reach out to see if can see sooner.   Return to clinic in 8 week at that time x-ray of her clavicle is needed,

## 2024-01-03 ENCOUNTER — OFFICE VISIT (OUTPATIENT)
Dept: ORTHOPEDICS | Facility: CLINIC | Age: 64
End: 2024-01-03
Payer: COMMERCIAL

## 2024-01-03 ENCOUNTER — HOSPITAL ENCOUNTER (OUTPATIENT)
Dept: RADIOLOGY | Facility: HOSPITAL | Age: 64
Discharge: HOME | End: 2024-01-03
Attending: PHYSICIAN ASSISTANT
Payer: COMMERCIAL

## 2024-01-03 VITALS — BODY MASS INDEX: 18.33 KG/M2 | WEIGHT: 110 LBS | HEIGHT: 65 IN

## 2024-01-03 DIAGNOSIS — S42.031A: ICD-10-CM

## 2024-01-03 DIAGNOSIS — S42.031A: Primary | ICD-10-CM

## 2024-01-03 PROCEDURE — 73000 X-RAY EXAM OF COLLAR BONE: CPT | Mod: 26,RT,, | Performed by: RADIOLOGY

## 2024-01-03 PROCEDURE — 99213 OFFICE O/P EST LOW 20 MIN: CPT | Mod: S$GLB,,, | Performed by: PHYSICIAN ASSISTANT

## 2024-01-03 PROCEDURE — 99999 PR PBB SHADOW E&M-EST. PATIENT-LVL III: CPT | Mod: PBBFAC,,, | Performed by: PHYSICIAN ASSISTANT

## 2024-01-03 PROCEDURE — 73000 X-RAY EXAM OF COLLAR BONE: CPT | Mod: TC,RT

## 2024-01-03 PROCEDURE — 3008F BODY MASS INDEX DOCD: CPT | Mod: CPTII,S$GLB,, | Performed by: PHYSICIAN ASSISTANT

## 2024-01-03 RX ORDER — GABAPENTIN 100 MG/1
100 CAPSULE ORAL 3 TIMES DAILY
Qty: 90 CAPSULE | Refills: 11 | Status: SHIPPED | OUTPATIENT
Start: 2024-01-03 | End: 2024-02-22

## 2024-01-04 ENCOUNTER — TELEPHONE (OUTPATIENT)
Dept: NEUROLOGY | Facility: CLINIC | Age: 64
End: 2024-01-04
Payer: COMMERCIAL

## 2024-01-04 NOTE — TELEPHONE ENCOUNTER
Spoke to Pt about scheduling an appt for her concussion. Pt is scheduled for January 30. Appt has been added to the wait list. Pt was advised to arrive 30 min early and informed about the 15 min chel period.

## 2024-01-04 NOTE — TELEPHONE ENCOUNTER
Spoke to Pt and offered a sooner date/time of Tuesday, January 9 at 9 am. However, Pt stated it would be too early and could not guarantee being on time. Pt stated she will keep her appt as is for January 30, but if she feels worse before Tuesday then she will call and schedule the sooner appt if necessary.     ----- Message from Austin Christian MD sent at 1/4/2024 12:54 PM CST -----  Dany Alvarado,    We will work to get her in sooner. However, there were likely delays in getting her scheduled as the initial referral to neurology was for a clavicle fracture, which we do not treat, and there was no mention in the referring clinic note as to why the patient was being referred to neurology, which makes it difficult for us to triage the patient to the appropriate neurology provider. With your updated description of her symptoms, it makes it easier for us to get her scheduled in the appropriate clinic.    Thank you,  Austin  ----- Message -----  From: Jenny Grajdea PA-C  Sent: 1/4/2024  10:30 AM CST  To: Lashay Boyer MA; Austin Christian MD    But that moved it further. She needs to see some one after hitting her head in a fall. She needs a post ED follow up.  She has issues since the fall such as headache and memory issues. She also has a shooting pain the travels down her arm and neck pain. She is having ulnar neuropathy symptoms also. I am not sure who she needs to see, but has been trying to get with neurology for months and is getting frustrated that her appointment keeps getting moved.   Can someone please reach out and talk to her to find out that the providers she is scheduled with is correct for what she needs.   I only follow the fracture and am trying to get her post ED follow up at the patients request.    Jenny    ----- Message -----  From: Lashay Boyer MA  Sent: 1/4/2024  10:06 AM CST  To: Jenny Grajeda PA-C    Good morning,     Pt is scheduled to see Dr. Christian on Jan 30. Her appt has been added to  the wait list for a sooner appt.     Thank you,  Lashay   ----- Message -----  From: Jenny Grajeda PA-C  Sent: 1/3/2024   1:13 PM CST  To: Lashay Boyer MA    The attached patient was scheduled in neurology for post concussion. She is concerned because her appointment was canceled via the portal, according to her. She is having progressive increased in short term memory loss and headaches since her trauma. She wanted my to reach out to see if she can be seen sooner. Can someone in your office please reach out to her.   Thanks,   Jenny

## 2024-01-04 NOTE — TELEPHONE ENCOUNTER
----- Message from Jenny Grajeda PA-C sent at 1/3/2024  1:11 PM CST -----  The attached patient was scheduled in neurology for post concussion. She is concerned because her appointment was canceled via the portal, according to her. She is having progressive increased in short term memory loss and headaches since her trauma. She wanted my to reach out to see if she can be seen sooner. Can someone in your office please reach out to her.   Thanks,   Jenny

## 2024-01-29 ENCOUNTER — TELEPHONE (OUTPATIENT)
Dept: NEUROLOGY | Facility: CLINIC | Age: 64
End: 2024-01-29
Payer: COMMERCIAL

## 2024-01-29 NOTE — TELEPHONE ENCOUNTER
Spoke with Pt and confirmed tomorrow's appt. Pt was advised to arrive 30 min early and informed about the 15 min chel period.

## 2024-01-30 ENCOUNTER — OFFICE VISIT (OUTPATIENT)
Dept: NEUROLOGY | Facility: CLINIC | Age: 64
End: 2024-01-30
Payer: COMMERCIAL

## 2024-01-30 VITALS — DIASTOLIC BLOOD PRESSURE: 76 MMHG | SYSTOLIC BLOOD PRESSURE: 133 MMHG | HEART RATE: 113 BPM

## 2024-01-30 DIAGNOSIS — R41.89 COGNITIVE CHANGE: ICD-10-CM

## 2024-01-30 DIAGNOSIS — S06.0X9S CONCUSSION WITH LOSS OF CONSCIOUSNESS, SEQUELA: Primary | ICD-10-CM

## 2024-01-30 DIAGNOSIS — M54.2 CERVICALGIA OF OCCIPITO-ATLANTO-AXIAL REGION: ICD-10-CM

## 2024-01-30 DIAGNOSIS — M54.81 OCCIPITAL NEURITIS: ICD-10-CM

## 2024-01-30 DIAGNOSIS — S13.4XXA WHIPLASH INJURY TO NECK, INITIAL ENCOUNTER: ICD-10-CM

## 2024-01-30 DIAGNOSIS — G44.86 CERVICOGENIC HEADACHE: ICD-10-CM

## 2024-01-30 DIAGNOSIS — G47.9 FATIGUE DUE TO SLEEP PATTERN DISTURBANCE: ICD-10-CM

## 2024-01-30 DIAGNOSIS — R53.83 FATIGUE DUE TO SLEEP PATTERN DISTURBANCE: ICD-10-CM

## 2024-01-30 DIAGNOSIS — R26.89 IMBALANCE: ICD-10-CM

## 2024-01-30 PROBLEM — S06.0X9A CONCUSSION WITH LOSS OF CONSCIOUSNESS: Status: ACTIVE | Noted: 2024-01-30

## 2024-01-30 PROCEDURE — 99205 OFFICE O/P NEW HI 60 MIN: CPT | Mod: S$GLB,,, | Performed by: PSYCHIATRY & NEUROLOGY

## 2024-01-30 PROCEDURE — 99999 PR PBB SHADOW E&M-EST. PATIENT-LVL V: CPT | Mod: PBBFAC,,, | Performed by: PSYCHIATRY & NEUROLOGY

## 2024-01-30 PROCEDURE — 3078F DIAST BP <80 MM HG: CPT | Mod: CPTII,S$GLB,, | Performed by: PSYCHIATRY & NEUROLOGY

## 2024-01-30 PROCEDURE — 1160F RVW MEDS BY RX/DR IN RCRD: CPT | Mod: CPTII,S$GLB,, | Performed by: PSYCHIATRY & NEUROLOGY

## 2024-01-30 PROCEDURE — 1159F MED LIST DOCD IN RCRD: CPT | Mod: CPTII,S$GLB,, | Performed by: PSYCHIATRY & NEUROLOGY

## 2024-01-30 PROCEDURE — 3075F SYST BP GE 130 - 139MM HG: CPT | Mod: CPTII,S$GLB,, | Performed by: PSYCHIATRY & NEUROLOGY

## 2024-01-30 RX ORDER — ALPRAZOLAM 0.5 MG/1
0.5 TABLET ORAL EVERY 6 HOURS PRN
COMMUNITY
Start: 2024-01-22

## 2024-01-30 RX ORDER — METHYLPREDNISOLONE 4 MG/1
TABLET ORAL
Qty: 21 TABLET | Refills: 0 | Status: SHIPPED | OUTPATIENT
Start: 2024-01-30 | End: 2024-02-22

## 2024-01-30 RX ORDER — TIZANIDINE 4 MG/1
4 TABLET ORAL EVERY 8 HOURS PRN
COMMUNITY
Start: 2024-01-22

## 2024-01-30 RX ORDER — HYDROCODONE BITARTRATE AND ACETAMINOPHEN 10; 325 MG/1; MG/1
1 TABLET ORAL EVERY 8 HOURS PRN
COMMUNITY
Start: 2024-01-16

## 2024-01-30 NOTE — PROGRESS NOTES
Chief Complaint: Head Injury    Subjective:     History of Present Illness    Referring Provider: Self Referral  Date of Injury: 10/1/23  Accompanied by:     01/30/2024: Eden Kuhn is a 63 y.o. female with h/o anxiety who presents for concussion evaluation. On 10/1/23, she does not remember anything hours before the fall and does not know how long she was on the ground after the fall, per  he found her on concrete steps unconscious for unknown period of time with right sided forehead wound that required stitches and found to have clavicle fracture that she is seeing ortho for, she did not realize what injuries she had and was stating she felt fine, bruised right hip and right knee. Currently, headaches are right temple or right parietal region, twice a week, throbbing, dull, 30 mins. She has right sided neck pain. She has associated right forehead numbness, tingling in RUE from anterior shoulder to anterior upper arm to lateral forearm that stops above the wrist. She has baseline photophobia that did not worsen with above injury. She has generalized weakness that she states could be from aging and indicates issues with RUE and RLE that she did PT for after the above injury. She has lightheadedness when she does not eat that did not worsen with above injury. She denies phonophobia, numbness, N/V, spinning, imbalance. She states her vision has changed from age and did not worsen with above injury. She has decreased appetite since the above injury and will use THC/CBD to help stimulate appetite. She denies changes in taste, smell, hearing. She denies tinnitus. She has cognitive fogginess that is new since above injury, concentration difficulties that is new since above injury, and short term memory difficulties that is new since above injury. She states she is sleeping poorly and gets 4 hours of sleep at night and wakes up tired. Per , she falls asleep when she sits down that is new since  above injury and she agrees. Per , she snores but has not worsened since above injury and no apnea. Headache improves lying down and vasal vagal maneuvers do not significantly worsen headaches. She denies headaches waking her up and will wake up with headache. Mood is interested in moving on and is impatient to get better. She has depression and anxiety from the above injury. She denies emotional lability. She has tried Norco, oxycodone, Tylenol, ibuprofen. She takes gabapentin for above tingling that she cannot take during the day as it makes her loopy per description. She has not done PT for head and neck. 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 She denies other prior head and neck injuries. Prior to current injury, headaches would only be if did not eat and with associated photophobia, phonophobia and no associated weakness, numbness, tingling, dizziness, N/V, change in vision, aura and would stop ADLs and no menstrual correlate.    Per ED note dated 10/1/23, she fell down 5 steps and landed on right shoulder and  found her unconscious with laceration to right face, has headache and right shoulder pain.    Current Outpatient Medications on File Prior to Visit   Medication Sig Dispense Refill    ALPRAZolam (XANAX) 0.5 MG tablet Take 0.5 mg by mouth every 6 (six) hours as needed.      b complex vitamins capsule Take 1 capsule by mouth once daily.      diphenoxylate-atropine 2.5-0.025 mg (LOMOTIL) 2.5-0.025 mg per tablet Take 1 tablet by mouth every 6 (six) hours as needed for Diarrhea.  0    ergocalciferol (ERGOCALCIFEROL) 50,000 unit Cap Take 50,000 Units by mouth twice a week.  5    gabapentin (NEURONTIN) 100 MG capsule Take 1 capsule (100 mg total) by mouth 3 (three) times daily. 90 capsule 11    HYDROcodone-acetaminophen (NORCO)  mg per tablet Take 1 tablet by mouth every 8 (eight) hours as needed.      levothyroxine  (SYNTHROID) 125 MCG tablet Take 125 mcg by mouth once daily.  1    pravastatin (PRAVACHOL) 80 MG tablet Take 80 mg by mouth once daily.      tiZANidine (ZANAFLEX) 4 MG tablet Take 4 mg by mouth every 8 (eight) hours as needed.      traZODone (DESYREL) 100 MG tablet Take 100 mg by mouth nightly as needed for Insomnia.      valACYclovir (VALTREX) 500 MG tablet Take 500 mg by mouth once daily.  9    albuterol (PROVENTIL/VENTOLIN HFA) 90 mcg/actuation inhaler Inhale 2 puffs into the lungs every 6 (six) hours as needed for Wheezing or Shortness of Breath.  6    liothyronine (CYTOMEL) 5 MCG Tab TK 1 T PO QAM  1     No current facility-administered medications on file prior to visit.       Review of patient's allergies indicates:   Allergen Reactions    Ciprocinonide Anaphylaxis       Family History   Problem Relation Age of Onset    Diabetes Mother     Thyroid disease Mother     Migraines Mother     Stroke Maternal Grandmother     Breast cancer Neg Hx     Ovarian cancer Neg Hx     Amblyopia Neg Hx     Blindness Neg Hx     Cancer Neg Hx     Cataracts Neg Hx     Glaucoma Neg Hx     Hypertension Neg Hx     Macular degeneration Neg Hx     Retinal detachment Neg Hx     Strabismus Neg Hx        Social History     Tobacco Use    Smoking status: Every Day     Current packs/day: 0.50     Average packs/day: 0.5 packs/day for 13.0 years (6.5 ttl pk-yrs)     Types: Cigarettes    Smokeless tobacco: Never   Substance Use Topics    Alcohol use: Yes     Alcohol/week: 2.0 - 3.0 standard drinks of alcohol     Types: 2 - 3 Cans of beer per week    Drug use: Yes     Types: Marijuana       Review of Systems  Constitutional: No fevers, no chills, no change in weight  Eye/Vision: See HPI  Ear/Nose/Mouth/Throat: See HPI; no cough, no runny nose, no sore throat  Respiratory: No shortness of breath, no problems breathing  Cardiovascular: No chest pain  Gastrointestinal: See HPI, diarrhea, constipation  Genitourinary: No dysuria  Musculoskeletal:  See HPI  Integumentary: No skin changes  Neurologic: See HPI  Psychiatric: depression, anxiety, denies SI and HI.  Additional System Information: (Decreased concentration.)    Objective:     Vitals:    01/30/24 1258   BP: 133/76   Pulse: (!) 113       General: Alert and awake, Well nourished, Well groomed, No acute distress, photophobia with 60 Hz hypersensitivity.  Eyes: Pupils are equal, round and reactive to light; Extraocular movements are intact; Normal conjunctiva; no nystagmus; Visual fields are intact bilaterally in all cardinal directions; Head thrust negative bilaterally. VOR cancellation WNL  HENT: Normocephalic, Rinne test positive bilaterally, Oral mucosa is moist, No pharyngeal erythema.  Neck: Supple  No Stiffness  Patient has occipital point tenderness over the right greater and lesser occipital nerve without induction of headaches with jump sign and twitch response and no referred pain: 2+   No high, medial cervical pain with lateral movement of C1 over C2 and with isometric neck flexion and extension  Fluid patient turnaround with concurrent neck movement in direction of torso movement.  Right paraspinal cervical muscle spasm present  Cardiovascular: Normal rate, Regular rhythm, No murmur, No edema; no carotid bruits noted.  Musculoskeletal: No swelling.  Spine/torso exam: Spine/ torso exam is within normal limits   Integumentary: Warm, Dry, Intact, No pallor, No rash.    Neurologic Exam  Mental Status: orientated to time, person, and place; good recent and remote memory; attention WNL and concentration impaired; naming intact; adequate fund of knowledge. No aphasia or dysarthria. Repetition intact. Follows complex commands    Cranial Nerves: as above, V1-V3 temperature sensation WNL bilaterally, face symmetric, symmetrical palatal rise, SCM 5/5 bilaterally, tongue protrusion midline and movements WNL  no saccadic intrusions of volitional ocular smooth pursuits  no saccadic dysmetria  pain with  "sustained upgaze and convergence  visual motion sensitivity/dizziness produced with rapid eye movements or neck movements  right-lateralizing Catherine tuning fork exam  no convergence insufficiency with no diplopia developed > 5 " accommodation    Muscle Tone/Motor Function: Normal bulk and tone throughout. No drift. Normal rapidly alternating movements. No tremors. No abnormal movements                                                                                                          Right                   Left                                  Deltoid at least 4/5 but pain limited    5/5 left side                                  Biceps          5/5                      5/5                                  Triceps         5/5                      5/5                                  Iliopsoas       5/5                     5/5                                  Quadriceps   5/5                     5/5                                  Hamstring     5/5                     5/5                                  Dorsiflexion   5/5                     5/5    Sensory: Vibration sensation WNL bilateral fingers and left toes and reduce right toes, Temperature sensation WNL x4, Negative Romberg, unsteady on tandem stance    Reflexes: Symmetrical DTR's, Biceps 2+, Brachioradialis 2+, Patellar 2+, No Wartenberg or Lhermitte    Coordination: No truncal ataxia. Finger to nose WNL bilaterally    Gait: Gait WNL, Heel to toe walking WNL    Labs:  Admission on 10/01/2023, Discharged on 10/02/2023   Component Date Value Ref Range Status    HIV 1/2 Ag/Ab 10/01/2023 Non-reactive  Non-reactive Final    Hepatitis C Ab 10/01/2023 Non-reactive  Non-reactive Final    WBC 10/01/2023 15.97 (H)  3.90 - 12.70 K/uL Final    RBC 10/01/2023 3.78 (L)  4.00 - 5.40 M/uL Final    Hemoglobin 10/01/2023 13.3  12.0 - 16.0 g/dL Final    Hematocrit 10/01/2023 38.7  37.0 - 48.5 % Final    MCV 10/01/2023 102 (H)  82 - 98 fL Final    MCH 10/01/2023 35.2 (H)  " 27.0 - 31.0 pg Final    MCHC 10/01/2023 34.4  32.0 - 36.0 g/dL Final    RDW 10/01/2023 13.4  11.5 - 14.5 % Final    Platelets 10/01/2023 373  150 - 450 K/uL Final    MPV 10/01/2023 9.5  9.2 - 12.9 fL Final    Immature Granulocytes 10/01/2023 0.4  0.0 - 0.5 % Final    Gran # (ANC) 10/01/2023 12.5 (H)  1.8 - 7.7 K/uL Final    Immature Grans (Abs) 10/01/2023 0.07 (H)  0.00 - 0.04 K/uL Final    Comment: Mild elevation in immature granulocytes is non specific and   can be seen in a variety of conditions including stress response,   acute inflammation, trauma and pregnancy. Correlation with other   laboratory and clinical findings is essential.      Lymph # 10/01/2023 2.1  1.0 - 4.8 K/uL Final    Mono # 10/01/2023 1.2 (H)  0.3 - 1.0 K/uL Final    Eos # 10/01/2023 0.1  0.0 - 0.5 K/uL Final    Baso # 10/01/2023 0.08  0.00 - 0.20 K/uL Final    nRBC 10/01/2023 0  0 /100 WBC Final    Gran % 10/01/2023 78.2 (H)  38.0 - 73.0 % Final    Lymph % 10/01/2023 12.8 (L)  18.0 - 48.0 % Final    Mono % 10/01/2023 7.3  4.0 - 15.0 % Final    Eosinophil % 10/01/2023 0.8  0.0 - 8.0 % Final    Basophil % 10/01/2023 0.5  0.0 - 1.9 % Final    Differential Method 10/01/2023 Automated   Final    BNP 10/01/2023 18  0 - 99 pg/mL Final    Values of less than 100 pg/ml are consistent with non-CHF populations.    Troponin I 10/01/2023 <0.006  0.000 - 0.026 ng/mL Final    Comment: The reference interval for Troponin I represents the 99th percentile   cutoff   for our facility and is consistent with 3rd generation assay   performance.      Specimen UA 10/02/2023 Urine, Clean Catch   Final    Color, UA 10/02/2023 Yellow  Yellow, Straw, Ingrid Final    Appearance, UA 10/02/2023 Hazy (A)  Clear Final    pH, UA 10/02/2023 6.0  5.0 - 8.0 Final    Specific Gravity, UA 10/02/2023 1.005  1.005 - 1.030 Final    Protein, UA 10/02/2023 Negative  Negative Final    Comment: Recommend a 24 hour urine protein or a urine   protein/creatinine ratio if globulin induced  proteinuria is  clinically suspected.      Glucose, UA 10/02/2023 Negative  Negative Final    Ketones, UA 10/02/2023 Negative  Negative Final    Bilirubin (UA) 10/02/2023 Negative  Negative Final    Occult Blood UA 10/02/2023 2+ (A)  Negative Final    Nitrite, UA 10/02/2023 Negative  Negative Final    Leukocytes, UA 10/02/2023 2+ (A)  Negative Final    TSH 10/01/2023 2.424  0.400 - 4.000 uIU/mL Final    RBC, UA 10/02/2023 2  0 - 4 /hpf Final    WBC, UA 10/02/2023 3  0 - 5 /hpf Final    Bacteria 10/02/2023 Rare  None-Occ /hpf Final    Squam Epithel, UA 10/02/2023 2  /hpf Final    Microscopic Comment 10/02/2023 SEE COMMENT   Final    Comment: Other formed elements not mentioned in the report are not   present in the microscopic examination.       POC Glucose 10/02/2023 69 (L)  70 - 110 mg/dL Final    POC BUN 10/02/2023 5 (L)  6 - 30 mg/dL Final    POC Creatinine 10/02/2023 0.5  0.5 - 1.4 mg/dL Final    POC Sodium 10/02/2023 132 (L)  136 - 145 mmol/L Final    POC Potassium 10/02/2023 7.3 (HH)  3.5 - 5.1 mmol/L Final    POC Chloride 10/02/2023 103  95 - 110 mmol/L Final    POC TCO2 (MEASURED) 10/02/2023 20 (L)  23 - 29 mmol/L Final    POC Ionized Calcium 10/02/2023 0.95 (L)  1.06 - 1.42 mmol/L Final    POC Hematocrit 10/02/2023 40  36 - 54 %PCV Final    Sample 10/02/2023 ETHAN   Final    POC Glucose 10/02/2023 72  70 - 110 mg/dL Final    POC BUN 10/02/2023 5 (L)  6 - 30 mg/dL Final    POC Creatinine 10/02/2023 0.4 (L)  0.5 - 1.4 mg/dL Final    POC Sodium 10/02/2023 135 (L)  136 - 145 mmol/L Final    POC Potassium 10/02/2023 4.4  3.5 - 5.1 mmol/L Final    POC Chloride 10/02/2023 102  95 - 110 mmol/L Final    POC TCO2 (MEASURED) 10/02/2023 21 (L)  23 - 29 mmol/L Final    POC Ionized Calcium 10/02/2023 1.06  1.06 - 1.42 mmol/L Final    POC Hematocrit 10/02/2023 40  36 - 54 %PCV Final    Sample 10/02/2023 ETHAN   Final    Sodium 10/02/2023 136  136 - 145 mmol/L Final    Potassium 10/02/2023 4.4  3.5 - 5.1 mmol/L Final     Chloride 10/02/2023 104  95 - 110 mmol/L Final    CO2 10/02/2023 18 (L)  23 - 29 mmol/L Final    Glucose 10/02/2023 61 (L)  70 - 110 mg/dL Final    BUN 10/02/2023 5 (L)  8 - 23 mg/dL Final    Creatinine 10/02/2023 0.6  0.5 - 1.4 mg/dL Final    Calcium 10/02/2023 9.1  8.7 - 10.5 mg/dL Final    Total Protein 10/02/2023 7.5  6.0 - 8.4 g/dL Final    Albumin 10/02/2023 4.2  3.5 - 5.2 g/dL Final    Total Bilirubin 10/02/2023 0.7  0.1 - 1.0 mg/dL Final    Comment: For infants and newborns, interpretation of results should be based  on gestational age, weight and in agreement with clinical  observations.    Premature Infant recommended reference ranges:  Up to 24 hours.............<8.0 mg/dL  Up to 48 hours............<12.0 mg/dL  3-5 days..................<15.0 mg/dL  6-29 days.................<15.0 mg/dL      Alkaline Phosphatase 10/02/2023 35 (L)  55 - 135 U/L Final    AST 10/02/2023 34  10 - 40 U/L Final    *Result may be interfered by visible hemolysis    ALT 10/02/2023 25  10 - 44 U/L Final    eGFR 10/02/2023 >60.0  >60 mL/min/1.73 m^2 Final    Anion Gap 10/02/2023 14  8 - 16 mmol/L Final      I personally reviewed all current labs noted in today's chart.    Imaging:  I personally reviewed all diagnostic images and reports and agree with findings in the radiographical report as noted below unless otherwise specified.    CT Head/Face/C-Spine 10/2/23:  FINDINGS:  Artifacts related to beam hardening and/or motion degrade portions of the scans.  Allowing for the artifacts, the findings are as follows...     Head CT:     Right frontal extra cranial soft tissue swelling and hematoma, with a gas-containing wound.  The extracranial soft tissue swelling extends caudally into the right preseptal periorbital soft tissues.     No depressed calvarial fracture, acute intracranial hemorrhage, hydrocephalus, pathologic extra-axial fluid collection, discrete intracranial mass or mass effect, midline shift, brain herniation, brain  edema, or discrete acute large vascular territory brain infarct.     Partial empty sella.     There remains partial opacification of the right mastoid air cells, perhaps minimally increased from 01/09/2022.     Maxillofacial CT:     Right preseptal periorbital soft tissue swelling.     Within the limitations of CT, there is no scan evidence of acute injury of the ocular globes or postseptal intraorbital soft tissues.     No acute mandibular, maxillofacial, or orbital fracture deformity.     No dislocation of either TMJ.     There remains some scattered multifocal mucosal disease in paranasal sinuses, and partially opacified right mastoid air cells.  These findings are perhaps minimally increased from 01/09/2022.     Cervical spine CT:     Diffuse osteopenia.     Straightened lower cervical lordosis.     Pre-existing multilevel degenerative changes, with minimal spinal canal stenosis but varying levels of neural foraminal stenosis.     No acute cervical vertebral fracture deformity.     No acute traumatic vertebral malalignment in the cervical spine.  There is mild pre-existing C5-C6 anterolisthesis, similar to 2004, and likely on the basis of the advanced degenerative changes.     There is focal angulation/buckling the superior aspect of the anterior cortex of the T3 vertebral body.  This could represent a anterior compression fracture.  There are some associated degenerative changes, perhaps favoring an old injury.  But an acute T3 compression fracture is difficult to fully exclude, especially in this osteopenic patient.  No osseous retropulsion.     Small, diffusely hypoattenuating thyroid gland.     Biapical irregular reticulonodular pleural-pulmonary opacities most likely represent chronic fibronodular changes.     There is some minimal biapical interlobular pulmonary septal thickening; although this carries a broad differential diagnosis, interstitial pulmonary edema is a common etiology.     Impression:      HEAD CT:     Right frontal extra cranial soft tissue swelling and hematoma, with a gas-containing wound.     No underlying radiopaque foreign body, depressed calvarial fracture or acute intracranial abnormality.     MAXILLOFACIAL CT:     Right preseptal periorbital soft tissue swelling.     No acute fracture deformity.     CERVICAL SPINE CT:     T3 compression fracture, age indeterminate.     Diffuse osteopenia.     Multilevel degenerative changes, mainly caused neural foraminal stenosis.     No acute fracture deformity or acute traumatic malalignment in the cervical spine.     Small, hypoattenuating thyroid gland.  Correlate clinically for possible diminished thyroid function.     Biapical pulmonary opacities most likely represent chronic fibronodular changes but there are no corresponding studies with which to effectively compare.  Correlate clinically.  Consider outpatient chest CT in 3-6 months to further characterize this and help further exclude neoplasm.    My read: No acute intracranial abnormalities. Partially empty sella. No acute spinal cord abnormalities. Please see report for CT-Face    Assessment:       ICD-10-CM ICD-9-CM    1. Concussion with loss of consciousness, sequela  S06.0X9S 907.0       2. Cervicalgia of [id number] region  M54.2 723.1       3. Cervicogenic headache  G44.86 784.0       4. Occipital neuritis  M54.81 723.8       5. Fatigue due to sleep pattern disturbance  R53.83 780.79     G47.9 780.50       6. Cognitive change  R41.89 799.59       7. Imbalance  R26.89 781.2       8. Whiplash injury to neck, initial encounter  S13.4XXA 847.0          63 y.o. female with h/o anxiety who presents for concussion evaluation. On exam, she has occipital point tenderness over the right greater and lesser occipital nerve without induction of headaches with jump sign and twitch response and no referred pain, imbalance, vibratory loss right toes, impaired concentration. We discussed that  there is currently no universally accepted definition of concussion. We discussed that concussion is a traumatic brain injury. We discussed that concussion can occur as a result of an impact to the head or to the body. We discussed that our clinic considers concussion definitive if there is transient disruption of brain activity such as with loss of consciousness, amnesia as it relates to the day of the injury, or reports of neurological dysfunction on the day of injury. We discussed typical course, signs & symptoms, diagnostic findings, and treatment for concussion. We discussed that whiplash injury is a neck injury that can occur at a lower impact speed or force than concussion. We discussed that whiplash symptoms can be the same as concussion symptoms. We discussed typical course, signs & symptoms, diagnostic findings, and treatment for whiplash. Patient's exam and symptoms are the result of a kinetic force injury with resultant cranio cervical trauma and occipital neuritis. We discussed at length about the anatomy, symptomology, etiologies, and exam findings of occipital neuritis. We discussed the risks vs benefits of waiting vs acute therapy for occipital neuritis in that there is a risk of waiting for treatment in that permanent nerve damage could result as the nerve is chronically scarred into the muscle but there is no way to predict whether damage has already occurred until we start the treatment plan as described below. We discussed that head and/or neck injury can result in pain as well as cognitive, mood, and sleep disruption. We discussed that pain disturbances can disrupt sleep, cognition, and mood. We discussed that sleep disturbances can disrupt cognition, mood, and worsen pain. We discussed that mood disturbances can disrupt sleep, cognition, and worsen pain. Counseled the patient that steroids suppress the immune response, which means that they are at increased risk for merlene bacterial or  viral infections including COVID19 and if they were to become infected, they may have a more severe disease course. We discussed that any form of steroids including oral or injectable should not be taken within 2 weeks of COVID19 vaccination/booster. The patient has elected to take steroids. The patient's last COVID19 vaccination/booster was more than 2 weeks ago. Will have her start steroid pack in hospital here given previous listed allergy to an old steroid. We discussed with abnormality she feels where she hit head that with it being a gas containing wound that if the bump is cause of discomfort would see dermatology or plastic surgery. We discussed may have also had crushed nerve injury at site of impact and these injuries take up to 2 years to heal and during healing can have paresthesias and there is the chance could have some permanent symptoms at the impact site.     Plan:     Medrol dose pack prescribed. Take as instructed on package insert. Take the 1st pill in the lobby downstairs and then sit in the lobby for 30 minutes and if no side effects, may continue to take the pack as instructed. If have any side effects, please stop the Medrol and contact me.  The patient was instructed to ice the occipital region for no more than 20 minutes at least once a day but may repeat this as many times as they would like.   Discussed ergonomic accommodations for occipital neuritis/neuralgia. Mainly perform all work at eye level to minimize continued neck flexion which will aggravate the nerve.  Patient was encouraged to do daily light cardio exercise but instructed to limit physical activities to walking, walking in water up to the waist only or riding a stationary bike, recumbent preferred. No weight lifting in upper body, no neck massage, no acupuncture of neck, and no dry needling of upper neck. No neck PT unless otherwise stated. If neck PT is recommended, the therapist may do joint manipulation at this time but no  suboccipital soft tissue therapy. Any of your therapists may also do passive neck range of motion activities. No chiropractor work on neck. Lower body strengthening with resistant bands, leg machines, and strapping weights to legs okay. No core body workouts. No running or use of cardio equipment other than stationary bike. No swimming or body surfing. No amusement park rides. No lifting more than 5-10 lbs and bend at the knees, not the waist.  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 To be referred for consultation for occipital nerve release procedure if initially clinically responsive to injections but always with a return of symptoms.  Referral for vestibular PT for balance and eye movements  Referral for ST for cognition    63 minutes were spent on the date of this patient encounter, which includes: preparing to see the patient, reviewing previous history, obtaining new patient history, performing the physical exam, counseling and educating the patient and/or family/caregiver, ordering necessary medications or tests or referrals, documenting in the electronic medical record, coordinating care.    Austin Christian MD  Sports Neurology

## 2024-01-30 NOTE — PATIENT INSTRUCTIONS
Medrol dose pack prescribed. Take as instructed on package insert. Take the 1st pill in the lobby downstairs and then sit in the lobby for 30 minutes and if no side effects, may continue to take the pack as instructed. If have any side effects, please stop the Medrol and contact me.  The patient was instructed to ice the occipital region for no more than 20 minutes at least once a day but may repeat this as many times as they would like.   Discussed ergonomic accommodations for occipital neuritis/neuralgia. Mainly perform all work at eye level to minimize continued neck flexion which will aggravate the nerve.  Patient was encouraged to do daily light cardio exercise but instructed to limit physical activities to walking, walking in water up to the waist only or riding a stationary bike, recumbent preferred. No weight lifting in upper body, no neck massage, no acupuncture of neck, and no dry needling of upper neck. No neck PT unless otherwise stated. If neck PT is recommended, the therapist may do joint manipulation at this time but no suboccipital soft tissue therapy. Any of your therapists may also do passive neck range of motion activities. No chiropractor work on neck. Lower body strengthening with resistant bands, leg machines, and strapping weights to legs okay. No core body workouts. No running or use of cardio equipment other than stationary bike. No swimming or body surfing. No amusement park rides. No lifting more than 5-10 lbs and bend at the knees, not the waist.  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 To be referred for consultation for occipital nerve release procedure if initially clinically responsive to injections but always with a return of symptoms.  Referral for vestibular PT for balance and eye movements  Referral for ST for cognition

## 2024-02-05 ENCOUNTER — PATIENT MESSAGE (OUTPATIENT)
Dept: NEUROLOGY | Facility: CLINIC | Age: 64
End: 2024-02-05
Payer: COMMERCIAL

## 2024-02-19 NOTE — PROGRESS NOTES
"[facility]  Speech Therapy Evaluation - Concussion Clinic    Date: 2/20/2024     Name: Eden Kuhn   MRN: [id number]    Therapy Diagnosis:   Encounter Diagnosis   Name Primary?    Cognitive communication disorder Yes    Physician: Austin Christian MD  Physician Orders: Ambulatory Referral to Speech Therapy   Medical Diagnosis: Concussion with loss of consciousness, sequela [S06.0X9S], Cognitive change [R41.89]     Visit # / Visits Authorized:  1 / 1   Date of Evaluation:  2/20/2024   Insurance Authorization Period: 1/30/2024 - 1/29/2025   Plan of Care Certification:    2/20/2024 to 4/5/24      Time In: 1300    Time Out: 1345   Procedure Min.   Cognitive Communication Evaluation - including administration, scoring and interpretation   90     Precautions: Standard, Fall, and status post concussion   Subjective   Date of Onset: 10/1/23 fall   History of Current Condition:  Eden Kuhn is a 63 y.o. female who presents to [facility] for evaluation and treatment secondary to post-concussion syndrome. Patient was referred to therapy by Dr. Christian. Currently, pt is complaining of difficulty with short term memory and thinking. Some word finding difficulty also reported. Patient reported she used to be able to remember dates, but now she cannot recall recent days. Attention is reportedly "okay."  Endorsed sensitivity to light and sound; headaches frequent. She is also not sleeping well. Patient endorsed changes in mood, "more bummed out" and "uninterested" in daily activities. Patient endorsed fatigue, "a lot." Patient does not feel as though she has returned to baseline cognitive communication functioning. Per chart review, neurology 1/30/24 "She has cognitive fogginess that is new since above injury, concentration difficulties that is new since above injury, and short term memory difficulties that is new since above injury."     Previous history " "of:  Previous concussions: endorsed  Anxiety: endorsed  Depression: endorsed  Learning Disabilities: denied  ADHD: denied  Headaches and/or Migraines: denied    Past Medical History: Eden Kuhn  has a past medical history of Anxiety, Arthritis, Insomnia, Rib fractures, and Thyroid disease.  Eden Kuhn  has a past surgical history that includes bladder repair; Hysterectomy; Anal fistulotomy (01/21/2002); Cystocele repair (06/06/2011); Eye surgery (02/23/2012); and Esophagogastroduodenoscopy (N/A, 8/3/2018).  Medical Hx and Allergies: Eden has a current medication list which includes the following prescription(s): albuterol, alprazolam, b complex vitamins, diphenoxylate-atropine 2.5-0.025 mg, ergocalciferol, hydrocodone-acetaminophen, levothyroxine, liothyronine, meloxicam, pravastatin, tizanidine, trazodone, and valacyclovir.   Review of patient's allergies indicates:   Allergen Reactions    Ciprocinonide Anaphylaxis       Prior Therapy:  no   Social History:   Lives with:  at home; 2 dogs    Family responsibilities: divided household responsibilities, thought organization and lack of "will" to do something   Occupation: retired   Computer usage: not frequently   Driving: not yet      Education Level: college     Prior Level of Function: independent and athletic     Current Level of Function: dependent     Pain:   Location: Headache (base of skull, down neck)   7/10 currently    Nutrition:  No deficits, Oral, Thin liquids (IDDSI 0) and Regular consistencies (IDDSI 7)   Patient's Therapy Goals:  improve function in daily life   Objective   Formal Assessment:    The Repeatable Battery for the Assessment of Neuropsychological Status (RBANS) Version A was administered to measure the patient's attention, language, visuospatial/constructional abilities, and immediate and delayed memory. The results are outlined below:    Domain Subtest Total Score Index Score   Immediate Memory List Learning 19 " swelling of legs   65    Story Memory 11    Visuospatial/  Constructional Figure Copy 20   105    Line Orientation 15    Language Picture Naming 8   79    Semantic Fluency 16    Attention Digit Span 10   88    Coding 33      Delayed Memory List Recall 5     95    List Recognition 19     Story Recall 7     Figure Recall 9        Total Scale   82     Percentile   12     Descriptor   Low average   Interpretation:  Index score: mean of 100, standard deviation of 15. Therefore, 130+ = Very Superior; 120-129 = Superior; 110-119 = High average;  = Average; 80-89 = Low Average; 70-79 = Borderline; 69 and below = Extremely Low. Apparently the most reliable score; factor in education level.    Immediate Memory Score: Recalling information following immediate presentation is assessed through the List Learning and Story Memory subtests. In the List Learning subtest, the patient is given 10 words to remember. This list is presented four times overall. In this subtest, the patient did demonstrate learning over the 4 trials. The patient recalled 3 items on trial one, 4 items on trial two, 5 items on trial three, and 7 items in trial 4. In the Story Memory subtest, the patient recalled 4 details on the first presentation and 7 details on the second presentation. Results of this domain indicate extremely low verbal learning.  Visuospatial score: Perceiving spatial relations and constructing a spatially accurate copy of a drawing is assessed through the Figure Copy and Line Orientation subtests. The Figure Copy subtest asks the patient to copy a complex line drawing. The patient copied 20/20 items. The Line Orientation subtest the presents the patient with 12 line displays and asks the patient to match two given lines at the bottom to the display at the top.  The patient correctly identified 15/20. Results of this domain indicate average processing and using Visuospatial information.  Language score: Naming common items and retrieving learned  material is assessed through the Picture Naming and Semantic Fluency subtests. The Picture Naming subtest asks the patient to name 10 line drawings. The patient was able to accurately name 8/10 items. The Semantic Fluency subtest asks the patient to name as many animals as she can in 60 seconds. The patient was able to name 16 animals. These results indicate borderline fluent use of language.   Attention score: Attending to, holding and manipulating information presented visually and orally in working memory is assessed with use of the Digit Span and Coding subtests. The Digit Span subtest asks the patient to repeat progressively lengthening strings of numbers. The Coding subtest asks the patient to alternate attention between a key the given work and then to decode symbols to numbers. The patients results on these subtest indicate low average basic attention processes and speed of information processing.  Delayed Memory score: Anterograde memory capacity is assessed through the List Recall, List Recognition, Story Recall, and Figure Recall subtests. The List Recall subtest asks the patient to recall items from the list presented at the beginning of the test. The patient was able to recall 5/10 items. The List Recognition subtest has the patient recall whether a word was or was not in the original list. The patient accurately identified whether a word was or was not on the list in 19 of 20 items. On the Story Recall subtest, the patient was able to recall 7 details. Finally, on the Figure Recall, the patient recalled 9 details. Results of this domain indicate average recognition and retrieval of of information from long-term memory stores    Overall, despite the low average scores on this assessment, the patient reports functional cognitive changes from her cognitive baseline. Patient's limitations are interfering with home life and work responsibilities, placing the patient at risk for a decline in quality of life.  Taking all of these concerns into account and using the patient as an element of evidence-based practice, the patient presents with a mild cognitive communication disorder charaterized by deficits in attention/working memory, delayed memory, and information processing. These difficulties are resulting in changes in overall executive functions.     Treatment   Total Treatment Time Separate from Evaluation: not applicable   No treatment performed secondary to time to complete evaluation.     Education provided:   -role of Speech Therapy, goals/plan of care, scheduling/cancellations, insurance limitations with patient  -Additional Education provided:   General concussion education     Patient expressed understanding.     Home Program: not yet established   Assessment     Eden presents to [facility] status post medical diagnosis of Concussion with loss of consciousness, sequela [S06.0X9S], Cognitive change [R41.89].      Interpretation of objective assessment:   Overall, despite the low average scores on this assessment, the patient reports functional cognitive changes from her cognitive baseline. Patient's limitations are interfering with home life and work responsibilities, placing the patient at risk for a decline in quality of life. Taking all of these concerns into account and using the patient as an element of evidence-based practice, the patient presents with a mild cognitive communication disorder charaterized by deficits in attention/working memory, delayed memory, and information processing. These difficulties are resulting in changes in overall executive functions.     Demonstrates impairments including limitations as described in the problem list.     Positive prognostic factors: motivation   Negative prognostic factors: none  Barriers to therapy: No barriers to therapy identified.     Patient's spiritual, cultural, and educational needs considered and patient  agreeable to plan of care and goals.    Patient will benefit from skilled therapy.    Rehab Potential: good    Short Term Goals: (4 weeks) Current Progress:    Patient will complete alternating attention task with no more than 3 cues or 90% accuracy to improve alternating attention.     Progressing/ Not Met 2/20/2024   Established this date    2. Patient will use Goal Plan Action Review strategy to complete moderate to complex reasoning, planning, or organization tasks with 90% accuracy independently to improve functional executive function skills.    Progressing/ Not Met 2/20/2024   Established this date    3. Patient will complete short term recall tasks after a 5 minutes delay with 90% accuracy  independently  with use of memory strategies to improve recall of information and generalization of memory strategies.    Progressing/ Not Met 2/20/2024   Established this date    4. Patient will complete high level problem solving based tasks with 90% accuracy independently.    Progressing/ Not Met 2/20/2024   Established this date    5. Patient will complete mental manipulation tasks with 90% acc to improve working memory.    Progressing/ Not Met 2/20/2024   Established this date    6. Patient will independently implement cognitive/sensory rest periods throughout day after identifying cognitive fatigue including limiting sensory input (sound, light, etc.)     Progressing/ Not Met 2/20/2024   Established this date    7. Patient will identify two solutions to functional daily problems with thought flexibility and minimal assistance for use of strategies.      Progressing/ Not Met 2/20/2024   Established this date    8. Patient will complete a moderate to complex task to improve attention and memory (I.e. sample bill paying activity, recipe, or multiple choice comprehension questions to 1 paragraphs) with 80% accuracy and natural environment noise distractions (TV news background, music, etc.).    Progressing/ Not Met  2/20/2024   Established this date        Long Term Goals: (6 weeks) Current Progress:   1. Patient will improve  attention skills to effectively attend to and communicate in complex daily living tasks in functional living environment.    Established this date     2. Patient will demonstrate use of  goal setting, planning,  initiation, and  self-monitoring during daily living activities to improve safety and awareness in functional living environment.    Established this date     3. Patient will apply problem-solving strategies with no visual support in daily living functional activities at home and in the community.     Established this date     4. Patient will predict objective performance on completion of actual tasks to increase independence with required return to daily living environment.     Established this date     5. Patient will use appropriate memory strategies to schedule and recall weekly activities, express needs and recall names to maintain safety and participate socially in functional living environment.     Established this date       Plan     Recommended Treatment Plan:  Patient will participate in the [facility] program for speech therapy 1 times per week for 4 weeks to address her Cognition deficits, to educate patient and their family, and to participate in a home exercise program.    Other Recommendations:   Continue with Neurology   Referral to counseling     Therapist's Name:   ERI Hardwick, CF-SLP  Speech-Language Pathology Resident   2/20/2024

## 2024-02-20 ENCOUNTER — CLINICAL SUPPORT (OUTPATIENT)
Dept: REHABILITATION | Facility: HOSPITAL | Age: 64
End: 2024-02-20
Attending: PSYCHIATRY & NEUROLOGY
Payer: COMMERCIAL

## 2024-02-20 DIAGNOSIS — R41.841 COGNITIVE COMMUNICATION DISORDER: Primary | ICD-10-CM

## 2024-02-20 PROCEDURE — 96125 COGNITIVE TEST BY HC PRO: CPT

## 2024-02-20 NOTE — PATIENT INSTRUCTIONS
Memory Strategies:   Write: make a list or utilize a calendar   Repeat:  Repeat information out loud or internally   Associate:  Connect desired information with something you already recall. For example, when in the grocery, group items by meal or taste (sweet vs salty) or by category (vegetables, cold items, etc).  Picture: try to mentally picture what you are trying to remember  Mental Rehearsal: think through how you're going to complete a task before completing it.   BERTHA Simeon., WANG Campos, & MADAN Harrison (2012). Cognitive rehabilitation manual: Translating evidence-based recommendations into practice. [place], [place]: [facility].           Attention: Remember that inattention and lack of focus are major culprits to forgetting information so be sure and practice paying attention for adequate learning of information. If you rely on passive attention to remembering something (e.g., yeah, uh-huh approach), youll find you cannot recall it later. I recommend the following to improve attention, which may aid in later recall:   Reduce distractions in the area as much as possible.  Look at the person as they are speaking to you.   Paraphrase as they are speaking  Write down important pieces of information   Ask them to repeat if you zone out.   Have them simplify and reduce information that you need to attend to during conversation.   Have visual cues to remind you if you need to do something later.  Processing Speed:   Using multiple modalities (e.g., listening, writing notes, asking questions, recording) to learn new information is likely to allow additional time for processing, thus improving memory for the material.   Allowing sufficient time to complete tasks will reduce frustration and help to ensure completion.  Executive Functioning:  Dont attempt to multi-task.  Separate tasks so that each can be completed one at a time.  Consider using a calendar/day planner, as that may be effective to help  you plan and stay on track.  Color-coding specific tasks by importance may add additional benefit to your planner.  Break down large projects into smaller tasks and write down the steps to completing the task.  Taking notes while reading can help with recall.  Storing Information: Use the below strategies to help you further enhance how information is stored.  Rehearse - Immediately after seeing/hearing something, try to recall it.  Wait a few minutes, then check again.  Gradually lengthen the intervals between rehearsals.  Repetition of learned material is critical to ensure storage of information to be learned. Self-test at home to ensure learning.  Write down important information to improve your attention and focus and to have something to look back on when you need to recall it.  Make sure the person doesnt rattle off, but presents in a clear, logical, and unhurried manner.   Recalling Information:  Jog your memory - Lose something?  Think back to when you last had it.  What did you do next?  And after that?  Mentally walk yourself through each activity that followed.  Prodding your memory this way may enable you to recall the location of the missing item.  Use a cue - Symbolic reminders (the proverbial string around the finger) are helpful.  So too are memos, timers, calendar notes, etc.--keep them in visible, appropriate places.  Get organized - Have fixed locations for all important papers, key phone numbers, medications, keys, wallet, glasses, tools, etc.  Develop routines - Routines can anchor memories so they do not drift away.    6. Practice good cognitive/brain health hygiene:  Continue engaging in regular exercise, which increases alertness and arousal and can improve attention and focus.    Get a good nights sleep, as this can enhance alertness and cognition.  Eat healthy foods and balanced meals. It is notable that research indicates certain nutrients may aid in brain function, such as B vitamins  "(especially B6, B12, and folic acid), antioxidants (such as vitamins C and E, and beta carotene), and Omega-3 fatty acids. Talk with your physician or nutritionist about whats right for you.   Keep your brain active. Find activities to stay mentally active, such as reading, games (cards, checkers), puzzles (crosswords, Sudoku, jig saw), crafts (models, woodworking), gardening, or participating in activities in the community.  Stay socially engaged. Continue staying active with your family and friends.   Resources: Consider resources for support through the Governors Office of Elderly Affairs (http://goea.[place].gov/), [place] Chapter of the Alzheimers Association (www.alz.org/[facility]isT[facility]/), the Family Caregiver [place] (www.caregiver.org), and the American Psychological Association (http://www.apa.org/pi/about/publications/caregivers/consumers/index.aspxconsumers/index.aspx). See the following website for information about potential clinical trials: https://www.theaftd.org/research-clinical-trials/tuma-mp-afvajfnjonx/clinical-trials/    WRAP Write, Repeat, Associate, Picture - group of strategies for recall   Mental Rehearsal For "thinking through" your plan for the next day   Lists Create a list each night for the next day   Sticky Notes Bright and with specific instructions   Alarms  For things you need to remember regularly (i.e. Meals)   Mental Review How did I do with my strategies today?   Goal  Plan  Action  Review Goal - what is my goal?  Plan - how will I do it?  Action - try to complete goal  Review- how did it go?        Word Retrieval Strategies:   Visualization: try to see the thing in your head. Concentrate on the details of the picture and sometimes the word will come  Association:  Think of things that go with the word. For example, bread goes with butter, so if you are trying to think of the word "butter", you may think of the word "bread".  Gesture: use the hand motion you would use with the " "thing you are thinking of. For example, if you are thinking of the word "wash", you might make a motion as if you are washing your hands.   Description:  Describe the thing to the other person you are talking to. Even if the word does not come to you, the other person may be able to guess what you are trying to say.   First Letter or Sound:  Try to think of the first letter of the word. Sometimes you can think of the first letter even if you cannot think of the word. The letter may "lead" you to the word. You might even try going down the alphabet to find the first letter.     Goal Plan Action Review Strategy for Planning  Goal: what is your goal? What are you trying to accomplish?  Plan: what are the specific steps to get there?  Action: try to follow your plan  Review: How did it work?     BERTHA Simeon., WANG Campos, & MADAN Harrison (2012). Cognitive rehabilitation manual: Translating evidence-based recommendations into practice. [place], [place]: [facility]).       Listening Strategies:  Repetition:  Tell the speaker "please repeat that" if you need to hear it again.   Processing Rate: tell the speaker "slow down" or "wait a minute" to allow you more processing time.  Explanation: tell the speaker "please explain that" If you do not understand the idea of the message.   Spelling:  Tell the speaker "please spell that for me" if you are writing the information down.   Back-channeling: tell the speaker "let me see if I understand you correctly" or "let's see if I got the sense of that" then summarize what he or she has told you.                                               "

## 2024-02-22 ENCOUNTER — OFFICE VISIT (OUTPATIENT)
Dept: NEUROLOGY | Facility: CLINIC | Age: 64
End: 2024-02-22
Payer: COMMERCIAL

## 2024-02-22 VITALS — DIASTOLIC BLOOD PRESSURE: 72 MMHG | SYSTOLIC BLOOD PRESSURE: 108 MMHG | HEART RATE: 119 BPM

## 2024-02-22 DIAGNOSIS — G44.86 CERVICOGENIC HEADACHE: ICD-10-CM

## 2024-02-22 DIAGNOSIS — M54.2 CERVICALGIA OF OCCIPITO-ATLANTO-AXIAL REGION: ICD-10-CM

## 2024-02-22 DIAGNOSIS — S06.0X9S CONCUSSION WITH LOSS OF CONSCIOUSNESS, SEQUELA: Primary | ICD-10-CM

## 2024-02-22 DIAGNOSIS — R41.89 COGNITIVE CHANGE: ICD-10-CM

## 2024-02-22 DIAGNOSIS — R53.83 FATIGUE DUE TO SLEEP PATTERN DISTURBANCE: ICD-10-CM

## 2024-02-22 DIAGNOSIS — R26.89 IMBALANCE: ICD-10-CM

## 2024-02-22 DIAGNOSIS — M54.81 OCCIPITAL NEURITIS: ICD-10-CM

## 2024-02-22 DIAGNOSIS — G47.9 FATIGUE DUE TO SLEEP PATTERN DISTURBANCE: ICD-10-CM

## 2024-02-22 DIAGNOSIS — S13.4XXD WHIPLASH INJURY TO NECK, SUBSEQUENT ENCOUNTER: ICD-10-CM

## 2024-02-22 PROCEDURE — 99999 PR PBB SHADOW E&M-EST. PATIENT-LVL IV: CPT | Mod: PBBFAC,,, | Performed by: PSYCHIATRY & NEUROLOGY

## 2024-02-22 PROCEDURE — 3078F DIAST BP <80 MM HG: CPT | Mod: CPTII,S$GLB,, | Performed by: PSYCHIATRY & NEUROLOGY

## 2024-02-22 PROCEDURE — 3074F SYST BP LT 130 MM HG: CPT | Mod: CPTII,S$GLB,, | Performed by: PSYCHIATRY & NEUROLOGY

## 2024-02-22 PROCEDURE — 1160F RVW MEDS BY RX/DR IN RCRD: CPT | Mod: CPTII,S$GLB,, | Performed by: PSYCHIATRY & NEUROLOGY

## 2024-02-22 PROCEDURE — 99214 OFFICE O/P EST MOD 30 MIN: CPT | Mod: S$GLB,,, | Performed by: PSYCHIATRY & NEUROLOGY

## 2024-02-22 PROCEDURE — 1159F MED LIST DOCD IN RCRD: CPT | Mod: CPTII,S$GLB,, | Performed by: PSYCHIATRY & NEUROLOGY

## 2024-02-22 RX ORDER — MELOXICAM 7.5 MG/1
7.5 TABLET ORAL 2 TIMES DAILY
Qty: 30 TABLET | Refills: 3 | Status: SHIPPED | OUTPATIENT
Start: 2024-02-22 | End: 2024-04-11

## 2024-02-22 NOTE — PATIENT INSTRUCTIONS
Start meloxicam 7.5 mg twice a day. Take meloxicam with food.  The patient was instructed to ice the occipital region for no more than 20 minutes at least once a day but may repeat this as many times as they would like.   Discussed ergonomic accommodations for occipital neuritis/neuralgia. Mainly perform all work at eye level to minimize continued neck flexion which will aggravate the nerve.  Patient was encouraged to do daily light cardio exercise but instructed to limit physical activities to walking, walking in water up to the waist only or riding a stationary bike, recumbent preferred. No weight lifting in upper body, no neck massage, no acupuncture of neck, and no dry needling of upper neck. No neck PT unless otherwise stated. If neck PT is recommended, the therapist may do joint manipulation at this time but no suboccipital soft tissue therapy. Any of your therapists may also do passive neck range of motion activities. No chiropractor work on neck. Lower body strengthening with resistant bands, leg machines, and strapping weights to legs okay. No core body workouts. No running or use of cardio equipment other than stationary bike. No swimming or body surfing. No amusement park rides. No lifting more than 5-10 lbs and bend at the knees, not the waist.  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 To be referred for consultation for occipital nerve release procedure if initially clinically responsive to injections but always with a return of symptoms.  Referral for vestibular PT for balance and eye movements placed previously  Referral for ST for cognition placed previously
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
## 2024-02-23 PROBLEM — R41.841 COGNITIVE COMMUNICATION DISORDER: Status: ACTIVE | Noted: 2024-02-23

## 2024-02-23 NOTE — PLAN OF CARE
"[facility]  Speech Therapy Evaluation - Concussion Clinic    Date: 2/20/2024     Name: Eden Kuhn   MRN: [id number]    Therapy Diagnosis:   Encounter Diagnosis   Name Primary?    Cognitive communication disorder Yes    Physician: Austin Christian MD  Physician Orders: Ambulatory Referral to Speech Therapy   Medical Diagnosis: Concussion with loss of consciousness, sequela [S06.0X9S], Cognitive change [R41.89]     Visit # / Visits Authorized:  1 / 1   Date of Evaluation:  2/20/2024   Insurance Authorization Period: 1/30/2024 - 1/29/2025   Plan of Care Certification:    2/20/2024 to 4/5/24      Time In: 1300    Time Out: 1345   Procedure Min.   Cognitive Communication Evaluation - including administration, scoring and interpretation   90     Precautions: Standard, Fall, and status post concussion   Subjective   Date of Onset: 10/1/23 fall   History of Current Condition:  Eden Kuhn is a 63 y.o. female who presents to [facility] Out[facility] Speech Therapy for evaluation and treatment secondary to post-concussion syndrome. Patient was referred to therapy by Dr. Christian. Currently, pt is complaining of difficulty with short term memory and thinking. Some word finding difficulty also reported. Patient reported she used to be able to remember dates, but now she cannot recall recent days. Attention is reportedly "okay."  Endorsed sensitivity to light and sound; headaches frequent. She is also not sleeping well. Patient endorsed changes in mood, "more bummed out" and "uninterested" in daily activities. Patient endorsed fatigue, "a lot." Patient does not feel as though she has returned to baseline cognitive communication functioning. Per chart review, neurology 1/30/24 "She has cognitive fogginess that is new since above injury, concentration difficulties that is new since above injury, and short term memory difficulties that is new since above injury."     Previous history " "of:  Previous concussions: endorsed  Anxiety: endorsed  Depression: endorsed  Learning Disabilities: denied  ADHD: denied  Headaches and/or Migraines: denied    Past Medical History: Eden Khun  has a past medical history of Anxiety, Arthritis, Insomnia, Rib fractures, and Thyroid disease.  Eden Kuhn  has a past surgical history that includes bladder repair; Hysterectomy; Anal fistulotomy (01/21/2002); Cystocele repair (06/06/2011); Eye surgery (02/23/2012); and Esophagogastroduodenoscopy (N/A, 8/3/2018).  Medical Hx and Allergies: Eden has a current medication list which includes the following prescription(s): albuterol, alprazolam, b complex vitamins, diphenoxylate-atropine 2.5-0.025 mg, ergocalciferol, hydrocodone-acetaminophen, levothyroxine, liothyronine, meloxicam, pravastatin, tizanidine, trazodone, and valacyclovir.   Review of patient's allergies indicates:   Allergen Reactions    Ciprocinonide Anaphylaxis       Prior Therapy:  no   Social History:   Lives with:  at home; 2 dogs    Family responsibilities: divided household responsibilities, thought organization and lack of "will" to do something   Occupation: retired   Computer usage: not frequently   Driving: not yet      Education Level: college     Prior Level of Function: independent and athletic     Current Level of Function: dependent     Pain:   Location: Headache (base of skull, down neck)   7/10 currently    Nutrition:  No deficits, Oral, Thin liquids (IDDSI 0) and Regular consistencies (IDDSI 7)   Patient's Therapy Goals:  improve function in daily life   Objective   Formal Assessment:    The Repeatable Battery for the Assessment of Neuropsychological Status (RBANS) Version A was administered to measure the patient's attention, language, visuospatial/constructional abilities, and immediate and delayed memory. The results are outlined below:    Domain Subtest Total Score Index Score   Immediate Memory List Learning 19 "   65    Story Memory 11    Visuospatial/  Constructional Figure Copy 20   105    Line Orientation 15    Language Picture Naming 8   79    Semantic Fluency 16    Attention Digit Span 10   88    Coding 33      Delayed Memory List Recall 5     95    List Recognition 19     Story Recall 7     Figure Recall 9        Total Scale   82     Percentile   12     Descriptor   Low average   Interpretation:  Index score: mean of 100, standard deviation of 15. Therefore, 130+ = Very Superior; 120-129 = Superior; 110-119 = High average;  = Average; 80-89 = Low Average; 70-79 = Borderline; 69 and below = Extremely Low. Apparently the most reliable score; factor in education level.    Immediate Memory Score: Recalling information following immediate presentation is assessed through the List Learning and Story Memory subtests. In the List Learning subtest, the patient is given 10 words to remember. This list is presented four times overall. In this subtest, the patient did demonstrate learning over the 4 trials. The patient recalled 3 items on trial one, 4 items on trial two, 5 items on trial three, and 7 items in trial 4. In the Story Memory subtest, the patient recalled 4 details on the first presentation and 7 details on the second presentation. Results of this domain indicate extremely low verbal learning.  Visuospatial score: Perceiving spatial relations and constructing a spatially accurate copy of a drawing is assessed through the Figure Copy and Line Orientation subtests. The Figure Copy subtest asks the patient to copy a complex line drawing. The patient copied 20/20 items. The Line Orientation subtest the presents the patient with 12 line displays and asks the patient to match two given lines at the bottom to the display at the top.  The patient correctly identified 15/20. Results of this domain indicate average processing and using Visuospatial information.  Language score: Naming common items and retrieving learned  material is assessed through the Picture Naming and Semantic Fluency subtests. The Picture Naming subtest asks the patient to name 10 line drawings. The patient was able to accurately name 8/10 items. The Semantic Fluency subtest asks the patient to name as many animals as she can in 60 seconds. The patient was able to name 16 animals. These results indicate borderline fluent use of language.   Attention score: Attending to, holding and manipulating information presented visually and orally in working memory is assessed with use of the Digit Span and Coding subtests. The Digit Span subtest asks the patient to repeat progressively lengthening strings of numbers. The Coding subtest asks the patient to alternate attention between a key the given work and then to decode symbols to numbers. The patients results on these subtest indicate low average basic attention processes and speed of information processing.  Delayed Memory score: Anterograde memory capacity is assessed through the List Recall, List Recognition, Story Recall, and Figure Recall subtests. The List Recall subtest asks the patient to recall items from the list presented at the beginning of the test. The patient was able to recall 5/10 items. The List Recognition subtest has the patient recall whether a word was or was not in the original list. The patient accurately identified whether a word was or was not on the list in 19 of 20 items. On the Story Recall subtest, the patient was able to recall 7 details. Finally, on the Figure Recall, the patient recalled 9 details. Results of this domain indicate average recognition and retrieval of of information from long-term memory stores    Overall, despite the low average scores on this assessment, the patient reports functional cognitive changes from her cognitive baseline. Patient's limitations are interfering with home life and work responsibilities, placing the patient at risk for a decline in quality of life.  Taking all of these concerns into account and using the patient as an element of evidence-based practice, the patient presents with a mild cognitive communication disorder charaterized by deficits in attention/working memory, delayed memory, and information processing. These difficulties are resulting in changes in overall executive functions.     Treatment   Total Treatment Time Separate from Evaluation: not applicable   No treatment performed secondary to time to complete evaluation.     Education provided:   -role of Speech Therapy, goals/plan of care, scheduling/cancellations, insurance limitations with patient  -Additional Education provided:   General concussion education     Patient expressed understanding.     Home Program: not yet established   Assessment     Eden presents to [facility] status post medical diagnosis of Concussion with loss of consciousness, sequela [S06.0X9S], Cognitive change [R41.89].      Interpretation of objective assessment:   Overall, despite the low average scores on this assessment, the patient reports functional cognitive changes from her cognitive baseline. Patient's limitations are interfering with home life and work responsibilities, placing the patient at risk for a decline in quality of life. Taking all of these concerns into account and using the patient as an element of evidence-based practice, the patient presents with a mild cognitive communication disorder charaterized by deficits in attention/working memory, delayed memory, and information processing. These difficulties are resulting in changes in overall executive functions.     Demonstrates impairments including limitations as described in the problem list.     Positive prognostic factors: motivation   Negative prognostic factors: none  Barriers to therapy: No barriers to therapy identified.     Patient's spiritual, cultural, and educational needs considered and patient  agreeable to plan of care and goals.    Patient will benefit from skilled therapy.    Rehab Potential: good    Short Term Goals: (4 weeks) Current Progress:    Patient will complete alternating attention task with no more than 3 cues or 90% accuracy to improve alternating attention.     Progressing/ Not Met 2/20/2024   Established this date    2. Patient will use Goal Plan Action Review strategy to complete moderate to complex reasoning, planning, or organization tasks with 90% accuracy independently to improve functional executive function skills.    Progressing/ Not Met 2/20/2024   Established this date    3. Patient will complete short term recall tasks after a 5 minutes delay with 90% accuracy  independently  with use of memory strategies to improve recall of information and generalization of memory strategies.    Progressing/ Not Met 2/20/2024   Established this date    4. Patient will complete high level problem solving based tasks with 90% accuracy independently.    Progressing/ Not Met 2/20/2024   Established this date    5. Patient will complete mental manipulation tasks with 90% acc to improve working memory.    Progressing/ Not Met 2/20/2024   Established this date    6. Patient will independently implement cognitive/sensory rest periods throughout day after identifying cognitive fatigue including limiting sensory input (sound, light, etc.)     Progressing/ Not Met 2/20/2024   Established this date    7. Patient will identify two solutions to functional daily problems with thought flexibility and minimal assistance for use of strategies.      Progressing/ Not Met 2/20/2024   Established this date    8. Patient will complete a moderate to complex task to improve attention and memory (I.e. sample bill paying activity, recipe, or multiple choice comprehension questions to 1 paragraphs) with 80% accuracy and natural environment noise distractions (TV news background, music, etc.).    Progressing/ Not Met  2/20/2024   Established this date        Long Term Goals: (6 weeks) Current Progress:   1. Patient will improve  attention skills to effectively attend to and communicate in complex daily living tasks in functional living environment.    Established this date     2. Patient will demonstrate use of  goal setting, planning,  initiation, and  self-monitoring during daily living activities to improve safety and awareness in functional living environment.    Established this date     3. Patient will apply problem-solving strategies with no visual support in daily living functional activities at home and in the community.     Established this date     4. Patient will predict objective performance on completion of actual tasks to increase independence with required return to daily living environment.     Established this date     5. Patient will use appropriate memory strategies to schedule and recall weekly activities, express needs and recall names to maintain safety and participate socially in functional living environment.     Established this date       Plan     Recommended Treatment Plan:  Patient will participate in the [facility] rehabilitation program for speech therapy 1 times per week for 4 weeks to address her Cognition deficits, to educate patient and their family, and to participate in a home exercise program.    Other Recommendations:   Continue with Neurology   Referral to counseling     Therapist's Name:   ERI Hardwick, CF-SLP  Speech-Language Pathology Resident   2/20/2024

## 2024-02-28 ENCOUNTER — HOSPITAL ENCOUNTER (OUTPATIENT)
Dept: RADIOLOGY | Facility: HOSPITAL | Age: 64
Discharge: HOME | End: 2024-02-28
Attending: PHYSICIAN ASSISTANT
Payer: COMMERCIAL

## 2024-02-28 ENCOUNTER — OFFICE VISIT (OUTPATIENT)
Dept: ORTHOPEDICS | Facility: CLINIC | Age: 64
End: 2024-02-28
Payer: COMMERCIAL

## 2024-02-28 DIAGNOSIS — S42.031A: Primary | ICD-10-CM

## 2024-02-28 DIAGNOSIS — S42.031A: ICD-10-CM

## 2024-02-28 PROCEDURE — 99999 PR PBB SHADOW E&M-EST. PATIENT-LVL III: CPT | Mod: PBBFAC,,, | Performed by: PHYSICIAN ASSISTANT

## 2024-02-28 PROCEDURE — 73000 X-RAY EXAM OF COLLAR BONE: CPT | Mod: TC,RT

## 2024-02-28 PROCEDURE — 73000 X-RAY EXAM OF COLLAR BONE: CPT | Mod: 26,RT,, | Performed by: RADIOLOGY

## 2024-02-28 PROCEDURE — 99213 OFFICE O/P EST LOW 20 MIN: CPT | Mod: S$GLB,,, | Performed by: PHYSICIAN ASSISTANT

## 2024-02-28 NOTE — PROGRESS NOTES
Subjective:     Patient ID: Eden Kuhn is a 63 y.o. female.    Chief Complaint: Pain and Injury of the Right Shoulder    HPI  Patient is a 63 year old female who presented to the ED on 10/01/23 s/p fall down 5 steps and landing on her right shoulder. Ed reports that this was secondary to narcotic withdrawal.   RADS: Mildly displaced fracture of the right distal clavicle  Patient treated in a sling. She stated that she is doing no so great. She reports that her pain is not controlled. She takes Norco 10 at baseline and has not been able to get filled because she cannot find pharmacy which has it. She took tyelnol arthritis and oxycodone 5mg without any relief. She stated that she has tried to use the sling but it is uncomfortable.     10/11/23: Over all better, but continued pain. She has used the sling as needed. She has been NWB. She is taking pain medication. No numbness or tingling.     11/0923: Over all doing better. She is attending PT which is helping.  No numbness or tingling.     01/04/23: Able to do more, but has continued pain. She is describing a shooting pain down her arm at times from her neck area and other times from her elbow. She has a soreness in her anterior chest. She is taking pain medication more than her baseline which is 2 Norco 10 per day at times she is in need of 3. She has d/c PT and is working on her own.   Patient is concerned that she still has not been able to be see by neurology. She reports she had a head injury when she fell. She was to follow up with them but has had trouble with the appointments. She stated that she has noticed some memory issues as well as increased in headaches. She also reports some shakiness in her right arm and issues with fine motor movements.  She is eager to follow up with neurology and is concerned that they keep moving her appointments.     02/28/24: Overall doing well with the clavicle. She reports it feels better and she is able to do more  activity.     Review of Systems   Constitutional: Negative for chills and fever.   Cardiovascular:  Negative for chest pain.   Respiratory:  Negative for cough and shortness of breath.    Skin:  Negative for color change, dry skin, itching, nail changes, poor wound healing and rash.   Musculoskeletal:  Positive for falls.   Neurological:  Negative for dizziness.   Psychiatric/Behavioral:  Negative for altered mental status. The patient is not nervous/anxious.    All other systems reviewed and are negative.      Objective:       General    Constitutional: She is oriented to person, place, and time. She appears well-developed and well-nourished. No distress.   HENT:   Head: Atraumatic.   Eyes: Conjunctivae are normal.   Cardiovascular:  Normal rate.            Pulmonary/Chest: Effort normal.   Neurological: She is alert and oriented to person, place, and time.   Psychiatric: She has a normal mood and affect. Her behavior is normal.             Right Hand/Wrist Exam     Range of Motion   The patient has normal right hand/wrist ROM.    Other     Neuorologic Exam    Ulnar Distribution: abnormal      Right Elbow Exam     Range of Motion   The patient has normal right elbow ROM.    Tests   Tinel's sign (cubital tunnel): positive      Right Shoulder Exam     Range of Motion   Active abduction:  normal   Forward Flexion:  120 abnormal   External Rotation 0 degrees:  abnormal   External Rotation 90 degrees: abnormal  Internal rotation 0 degrees:  abnormal   Internal rotation 90 degrees:  abnormal     Tests & Signs   Drop arm: negative  Benavidez test: negative  Impingement: negative    Vascular Exam     Right Pulses      Radial:                    2+        Physical Exam  Constitutional:       General: She is not in acute distress.     Appearance: She is well-developed and well-nourished. She is not diaphoretic.   HENT:      Head: Atraumatic.   Eyes:      Conjunctiva/sclera: Conjunctivae normal.   Cardiovascular:      Rate and  Rhythm: Normal rate.      Pulses:           Radial pulses are 2+ on the right side.   Pulmonary:      Effort: Pulmonary effort is normal.   Musculoskeletal:      Right hand: Decreased sensation of the ulnar distribution.   Neurological:      Mental Status: She is alert and oriented to person, place, and time.   Psychiatric:         Mood and Affect: Mood and affect normal.         Behavior: Behavior normal.         RADS: reviewed by myself .   Healing fracture of the distal clavicle remain in unchanged and satisfactory position as compared to the previous study    Assessment:     Encounter Diagnosis   Name Primary?    Traumatic closed displaced fracture of acromial end of clavicle, right, initial encounter Yes         Plan:      Per  at this time plan is for non-operative management  ROMAT, ok to slowly begin weightbearing.   Order placed for PT  Pain medication: multimodal     Has appointment with neurology asked for me to reach out to see if can see sooner.   Return to clinic in 8 week if any issues at that time x-ray of her clavicle is needed,
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
## 2024-03-01 ENCOUNTER — CLINICAL SUPPORT (OUTPATIENT)
Dept: REHABILITATION | Facility: HOSPITAL | Age: 64
End: 2024-03-01
Payer: COMMERCIAL

## 2024-03-01 ENCOUNTER — CLINICAL SUPPORT (OUTPATIENT)
Dept: REHABILITATION | Facility: HOSPITAL | Age: 64
End: 2024-03-01
Attending: PSYCHIATRY & NEUROLOGY
Payer: COMMERCIAL

## 2024-03-01 DIAGNOSIS — R41.841 COGNITIVE COMMUNICATION DISORDER: Primary | ICD-10-CM

## 2024-03-01 DIAGNOSIS — H55.81 SACCADIC EYE MOVEMENT DEFICIENCY: ICD-10-CM

## 2024-03-01 DIAGNOSIS — R26.89 BALANCE DISTURBANCE DUE TO OLD HEAD TRAUMA: Primary | ICD-10-CM

## 2024-03-01 DIAGNOSIS — S09.90XS BALANCE DISTURBANCE DUE TO OLD HEAD TRAUMA: Primary | ICD-10-CM

## 2024-03-01 DIAGNOSIS — H51.11 CONVERGENCE INSUFFICIENCY: ICD-10-CM

## 2024-03-01 DIAGNOSIS — H81.8X9 DEFICIT OF VESTIBULO-OCULAR REFLEX: ICD-10-CM

## 2024-03-01 PROCEDURE — 97130 THER IVNTJ EA ADDL 15 MIN: CPT | Mod: PO | Performed by: SPEECH-LANGUAGE PATHOLOGIST

## 2024-03-01 PROCEDURE — 97129 THER IVNTJ 1ST 15 MIN: CPT | Mod: PO | Performed by: SPEECH-LANGUAGE PATHOLOGIST

## 2024-03-01 PROCEDURE — 97161 PT EVAL LOW COMPLEX 20 MIN: CPT | Mod: PO

## 2024-03-01 NOTE — PATIENT INSTRUCTIONS
Memory Strategies:   Write: make a list or utilize a calendar   Repeat:  Repeat information out loud or internally   Associate:  Connect desired information with something you already recall. For example, when in the grocery, group items by meal or taste (sweet vs salty) or by category (vegetables, cold items, etc).  Picture: try to mentally picture what you are trying to remember  Mental Rehearsal: think through how you're going to complete a task before completing it.   BERTHA Simeon., WNAG Campos, & MADAN Harrison (2012). Cognitive rehabilitation manual: Translating evidence-based recommendations into practice. [place], [place]: [facility].           Attention: Remember that inattention and lack of focus are major culprits to forgetting information so be sure and practice paying attention for adequate learning of information. If you rely on passive attention to remembering something (e.g., yeah, uh-huh approach), youll find you cannot recall it later. I recommend the following to improve attention, which may aid in later recall:   Reduce distractions in the area as much as possible.  Look at the person as they are speaking to you.   Paraphrase as they are speaking  Write down important pieces of information   Ask them to repeat if you zone out.   Have them simplify and reduce information that you need to attend to during conversation.   Have visual cues to remind you if you need to do something later.  Processing Speed:   Using multiple modalities (e.g., listening, writing notes, asking questions, recording) to learn new information is likely to allow additional time for processing, thus improving memory for the material.   Allowing sufficient time to complete tasks will reduce frustration and help to ensure completion.  Executive Functioning:  Dont attempt to multi-task.  Separate tasks so that each can be completed one at a time.  Consider using a calendar/day planner, as that may be effective to help  you plan and stay on track.  Color-coding specific tasks by importance may add additional benefit to your planner.  Break down large projects into smaller tasks and write down the steps to completing the task.  Taking notes while reading can help with recall.  Storing Information: Use the below strategies to help you further enhance how information is stored.  Rehearse - Immediately after seeing/hearing something, try to recall it.  Wait a few minutes, then check again.  Gradually lengthen the intervals between rehearsals.  Repetition of learned material is critical to ensure storage of information to be learned. Self-test at home to ensure learning.  Write down important information to improve your attention and focus and to have something to look back on when you need to recall it.  Make sure the person doesnt rattle off, but presents in a clear, logical, and unhurried manner.   Recalling Information:  Jog your memory - Lose something?  Think back to when you last had it.  What did you do next?  And after that?  Mentally walk yourself through each activity that followed.  Prodding your memory this way may enable you to recall the location of the missing item.  Use a cue - Symbolic reminders (the proverbial string around the finger) are helpful.  So too are memos, timers, calendar notes, etc.--keep them in visible, appropriate places.  Get organized - Have fixed locations for all important papers, key phone numbers, medications, keys, wallet, glasses, tools, etc.  Develop routines - Routines can anchor memories so they do not drift away.    6. Practice good cognitive/brain health hygiene:  Continue engaging in regular exercise, which increases alertness and arousal and can improve attention and focus.    Get a good nights sleep, as this can enhance alertness and cognition.  Eat healthy foods and balanced meals. It is notable that research indicates certain nutrients may aid in brain function, such as B vitamins  "(especially B6, B12, and folic acid), antioxidants (such as vitamins C and E, and beta carotene), and Omega-3 fatty acids. Talk with your physician or nutritionist about whats right for you.   Keep your brain active. Find activities to stay mentally active, such as reading, games (cards, checkers), puzzles (crosswords, Sudoku, jig saw), crafts (models, woodworking), gardening, or participating in activities in the community.  Stay socially engaged. Continue staying active with your family and friends.   Resources: Consider resources for support through the Governors Office of Elderly Affairs (http://goea.[place].gov/), [place] Chapter of the Alzheimers Association (www.alz.org/[facility]/), the Family Caregiver [place] (www.caregiver.org), and the American Psychological Association (http://www.apa.org/pi/about/publications/caregivers/consumers/index.aspxconsumers/index.aspx). See the following website for information about potential clinical trials: https://www.theaftd.org/research-clinical-trials/taed-ji-gqhteykuryx/clinical-trials/    WRAP Write, Repeat, Associate, Picture - group of strategies for recall   Mental Rehearsal For "thinking through" your plan for the next day   Lists Create a list each night for the next day   Sticky Notes Bright and with specific instructions   Alarms  For things you need to remember regularly (i.e. Meals)   Mental Review How did I do with my strategies today?   Goal  Plan  Action  Review Goal - what is my goal?  Plan - how will I do it?  Action - try to complete goal  Review- how did it go?        Word Retrieval Strategies:   Visualization: try to see the thing in your head. Concentrate on the details of the picture and sometimes the word will come  Association:  Think of things that go with the word. For example, bread goes with butter, so if you are trying to think of the word "butter", you may think of the word "bread".  Gesture: use the hand motion you would use with the " "thing you are thinking of. For example, if you are thinking of the word "wash", you might make a motion as if you are washing your hands.   Description:  Describe the thing to the other person you are talking to. Even if the word does not come to you, the other person may be able to guess what you are trying to say.   First Letter or Sound:  Try to think of the first letter of the word. Sometimes you can think of the first letter even if you cannot think of the word. The letter may "lead" you to the word. You might even try going down the alphabet to find the first letter.     Goal Plan Action Review Strategy for Planning  Goal: what is your goal? What are you trying to accomplish?  Plan: what are the specific steps to get there?  Action: try to follow your plan  Review: How did it work?     BERTHA Simeon., WANG Campos, & MADAN Harrison (2012). Cognitive rehabilitation manual: Translating evidence-based recommendations into practice. [place], [place]: [facility].       Listening Strategies:  Repetition:  Tell the speaker "please repeat that" if you need to hear it again.   Processing Rate: tell the speaker "slow down" or "wait a minute" to allow you more processing time.  Explanation: tell the speaker "please explain that" If you do not understand the idea of the message.   Spelling:  Tell the speaker "please spell that for me" if you are writing the information down.   Back-channeling: tell the speaker "let me see if I understand you correctly" or "let's see if I got the sense of that" then summarize what he or she has told you.                                                         Mindfulness & Relaxation Resources     Phone applications:     Insight timer: Free to download, free content or paid option $59.99/year    Smiling mind: Free to download, free content     Breathe: Free to download, free content     Calm: Free to download, free content/version or 7- day free trial for premium and then paid option " $69.99/year    HeadsBaldwin: Free year trial if unemployed or 2- week free trial and then $69.99/year   University Based Websites/Links:   [facility]: Free online resource with guided audio and video mindfulness/meditation recordings as well as yoga videos. Follow the link below to access:    [email or web address]://[email or web address].[facility]/wellness-center/wellness-mindfulness/mindfulness-meditation   [facility]: Free online resource with guided meditations and scripts; [facility] mindfulness phone luke also available to download. Follow the link below to access:    [email or web address].[email or web address]/patel/[email or web address]?[email or web address]&fr=true   BY: Free online resource with guided meditations, autogenics, and visualizations. Follow the link below to access:  [email or web address]/relaxation-recordings   Other Web-based Resources:    The Free Mindfulness Project: created to develop a collection of free to download mindfulness-based exercises in a centralized location. Follow the link below to access: http://www.freemindfulness.org/   YouReality Jockeyube: A great tool to search for free videos of whichever mindfulness techniques suit you best. Follow the below link to access the [employer] YouTube page with guided meditations, breathing techniques, yoga flows and more:  https://www.The Smartphone Physicalube.com/channel/VOPzuiK16ntsnwwJME1sn1wz/videos   Guided mindfulness meditation with Sanchez Meyers: mp3 series and books available to purchase, as well as daily meditations/dialogue. Follow the link below to access:  https://www.Automated Insightss.com/   Guided mountain meditations: Follow the link below to access:   https://www.The Smartphone Physicalube.com/watch?v=cK6LSNfUEEy&t=13s  https://www.youSport Streetube.com/watch?v=h-tpAjn6IfY

## 2024-03-03 PROBLEM — H51.11 CONVERGENCE INSUFFICIENCY: Status: ACTIVE | Noted: 2024-03-03

## 2024-03-03 PROBLEM — S09.90XS BALANCE DISTURBANCE DUE TO OLD HEAD TRAUMA: Status: ACTIVE | Noted: 2024-03-03

## 2024-03-03 PROBLEM — H81.8X9 DEFICIT OF VESTIBULO-OCULAR REFLEX: Status: ACTIVE | Noted: 2024-03-03

## 2024-03-03 PROBLEM — R26.89 BALANCE DISTURBANCE DUE TO OLD HEAD TRAUMA: Status: ACTIVE | Noted: 2024-03-03

## 2024-03-03 PROBLEM — H55.81 SACCADIC EYE MOVEMENT DEFICIENCY: Status: ACTIVE | Noted: 2024-03-03

## 2024-03-03 NOTE — PLAN OF CARE
[facility]  Physical Therapy Neurological Rehabilitation Initial Evaluation    Name: Eden Kuhn  Clinic Number: [id number]    Therapy Diagnosis:   Encounter Diagnoses   Name Primary?    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 Yes    Saccadic eye movement deficiency     Convergence insufficiency     Deficit of vestibulo-ocular reflex      Physician: Austin Christian MD    Physician Orders: PT Eval and Treat; Vestibular Therapy  Medical Diagnosis from Referral: Concussion with loss of consciousness, sequela [S06.0X9S]   Imbalance [R26.89]   Evaluation Date: 3/1/2024  Authorization Period Expiration: 12/31/2024  Plan of Care Expiration: 4/26/24  Visit # / Visits authorized: 1/1    Time In: 1200  Time Out: 1254  Total Billable Time: 54 minutes    Precautions: Standard    Subjective   Date of onset: October of 2023    History of current condition - Eden reports: She has done PT for her right shoulder following her right clavicle fracture with some benefit but she is still having a lot of pain in her neck and right shoulder that sometimes radiates and continues to give her issues in terms of UE function. She has also continued with SLP to address her cognitive deficits. She states that her balance has been off especially with vision eliminated; would like to work on this as she feels that she tends to list towards the right when she is walking. She also reports some issues with light sensitivity and noted that her Speech therapist had to dim the lights during her session just prior to PT evaluation.    History of migraines: none reported  Blood Pressure: 96/62 mmHg; HR - 116 bpm  History of Heart Condition: none noted     Per chart review, Office visit to Neurology on 1/30/24:  Assessment:    63 y.o. female with h/o anxiety who presents for concussion evaluation. On exam, she has occipital point tenderness over the right greater and lesser occipital nerve without induction of  headaches with jump sign and twitch response and no referred pain, imbalance, vibratory loss right toes, impaired concentration....    Plan  ...  Referral for vestibular PT for balance and eye movements      Office visit to Neurology on 2/22/24:  02/22/2024: Eden Kuhn is a 63 y.o. 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 On last visit, patient was prescribed a Medrol dose pack and started on ice therapy, ergonomics, and exercise restrictions and referred for vestibular PT and ST. She states clinic evaluation of occipital nerves last night exacerbated right occipital pain and this past Monday is when headache started to improve since the evaluation. She states that there was miscommunication between her and PT so was not able to start PT so starts it on 3/1. Headaches are every other day, right occipital, pressure, 45-60 mins. She has worsening right sided neck pain. She has associated photophobia, phonophobia, imbalance, lightheadedness. She denies weakness, numbness, tingling, N/V, change in vision. She has pain in right lateral shoulder to right elbow from her rotator cuff injury. She has had difficulties finding comfortable position to sleep and sleep has been choppy and wakes up tired and per  sleeps for 2-3 hours at a time and wakes up with pain. Mood fluctuates from positive to not. Medrol did not help and denies side effects. She is not icing as much as she should. She stopped gabapentin as she could not handle a higher dose another provider try to give her and made it feel like she was listing to the right.      Medical History:   Past Medical History:   Diagnosis Date    Anxiety     Arthritis     Insomnia     Rib fractures     Thyroid disease     hypothyroid       Surgical History:   Eden Kuhn  has a past surgical history that includes bladder  repair; Hysterectomy; Anal fistulotomy (01/21/2002); Cystocele repair (06/06/2011); Eye surgery (02/23/2012); and Esophagogastroduodenoscopy (N/A, 8/3/2018).    Medications:   Eden has a current medication list which includes the following prescription(s): albuterol, alprazolam, b complex vitamins, diphenoxylate-atropine 2.5-0.025 mg, ergocalciferol, hydrocodone-acetaminophen, levothyroxine, liothyronine, meloxicam, pravastatin, tizanidine, trazodone, and valacyclovir.    Allergies:   Review of patient's allergies indicates:   Allergen Reactions    Ciprocinonide Anaphylaxis        Imaging, CT scan films, X-Ray:   XR CLAVICLE RIGHT (2/28/24):  FINDINGS:  Healing fracture of the distal clavicle remain in unchanged and satisfactory position as compared to the previous study    CT HEAD WITHOUT CONTRAST; CT MAXILLOFACIAL WITHOUT CONTRAST; CT CERVICAL SPINE WITHOUT CONTRAST (10/1/23):  Impression:  HEAD CT:  Right frontal extra cranial soft tissue swelling and hematoma, with a gas-containing wound.  No underlying radiopaque foreign body, depressed calvarial fracture or acute intracranial abnormality.     MAXILLOFACIAL CT:  Right preseptal periorbital soft tissue swelling.  No acute fracture deformity.     CERVICAL SPINE CT:  T3 compression fracture, age indeterminate.  Diffuse osteopenia.  Multilevel degenerative changes, mainly caused neural foraminal stenosis.  No acute fracture deformity or acute traumatic malalignment in the cervical spine.  Small, hypoattenuating thyroid gland.  Correlate clinically for possible diminished thyroid function.  Biapical pulmonary opacities most likely represent chronic fibronodular changes but there are no corresponding studies with which to effectively compare.  Correlate clinically.  Consider outpatient chest CT in 3-6 months to further characterize this and help further exclude neoplasm.     This report was flagged in Epic as abnormal.    VNG: Not ordered or performed  Audiogram:  "Not ordered or performed    Prior Therapy: received for her neck  Social History: lives with her   Falls: 1 fall in October resulting in concussion   DME: grab bars in bath tub   Home Environment: single story home with about 8 steps to enter front door, 4 at the back door with no issues recently  Exercise Routine / History: does her home exercise program received following therapy for her arm   Family Present at time of Eval: none   Occupation: retired  Prior Level of Function: completely independent  Current Level of Function: more cautious when she is walking and unsteady about her balance    Pain:  Current 5/10, worst 9/10, best 5/10   Location:   neck and right shoulder  Description:  sharp, intense pain  Aggravating Factors:  none noticed, happens out of the blue  Easing Factors:  heat     Patient's goals: "Improve my gait so I'm not listing all of the time."    Objective   - Follows commands: 100% of time   - Speech: no deficits     Mental status: alert, oriented to person, place, and time, normal mood, behavior, speech, dress, motor activity, and thought processes      Posture Alignment in sitting/standing:   Head: forward head   Scapulae: rounded shoulders   Trunk: slouched posture   Pelvis: slouched posture       Sensation: Light Touch: intact bilaterally          Proprioception: NT, Kinesthesia NT    Auditory: denies changes         Visual/Oculomotor Screen: denies changes   Ocular range of motion: within normal limits   Spontaneous nystagmus (fixation present): none  Gaze-evoked nystagmus (fixation present): none  Tracking/Smooth Pursuits:Intact  Saccades: Impaired: small corrective movement noted to right   Convergence: impaired at 6.25" (15.9 cm) and with intense brief spinning sensation noted  VOR cancellation: impaired with decreased speed and dizziness lasting longer than 5 seconds    Acuity: Intact  Visual field: Intact  VCR: NT due to significant cervical pain with P/AROM (head remains " "still, body moves)    VOR 1: Impaired: very decreased speed due to neck pain; no visual slippage noted but mild dizziness  Head Thrust Test: NT due to significant cervical pain with P/AROM  DVA: NT due to significant cervical pain with P/AROM      ROM:   CERVICAL SPINE  Flexion 55 degrees (80-90 deg)  Extension 30 degrees (70-80 deg)  L side bend 35 degrees, R side bend 30* degrees (20-45 degrees)  L rotation 70 degrees, R rotation 46* degrees (70-90 degrees)  "*" indicates pain with movement    Upper cervical instability testing: Deferred on today due to pt reports of increased tenderness to cervical musculature and significant increase in pain with cervical P/AROM    Modified VAS (Vertebral Artery Screen), in sitting (rotation, then extension):  R: NT due to significant cervical pain limiting P/AROM; activities involving end range cervical movements deferred on today  L: NT due to significant cervical pain limiting P/AROM; activities involving end range cervical movements deferred on today        RANGE OF MOTION--LOWER EXTREMITIES  (R) LE Hip: within functional limits    Knee: within functional limits    Ankle: within functional limits     (L) LE: Hip: within functional limits    Knee: within functional limits    Ankle: within functional limits     Strength: manual muscle test grades below   Lower Extremity Strength: Not formally assessed on today; grossly >/= 3+/5 to BLE with observation of functional movement      NURIS SENSORY TESTING:  (P= Pass, F= Fail; note any sway; hold each position for 30")  Condition 1: (firm surface/feet together/eyes open) 30"  Condition 2: (firm surface/feet together/eyes closed) 30" min sway  Condition 3: (firm surface/feet in tandem/eyes open) 30" min sway  Condition 4: (firm surface/feet in tandem/eyes closed) 30" mod sway  Condition 5: (soft surface/feet together/eyes open) 30"  Condition 6: (soft surface/feet together/eyes closed) 30" min sway      Functional Gait Assessment: "   1. Gait on level surface =  2 (6.46 s)   (3) Normal: less than 5.5 sec, no A.D., no imbalance, normal gait pattern, deviates< 6in   (2) Mild impairment: 7-5.6 sec, uses A.D., mild gait deviations, or deviates 6-10 in   (1) Moderate impairment: > 7 sec, slow speed, imbalance, deviates 10-15 in.   (0) Severe impairment: needs assist, deviates >15 in, reach/touch wall  2. Change in Gait Speed = 3   (3) Normal: smooth change w/o loss of balance or gait deviation, deviates < 6 in, significant difference between speeds   (2) Mild impairment: changes speed, but demonstrates mild gait deviations, deviates 6-10 in, OR no deviations but unable to significantly speed, OR uses A.D.   (1) Moderate impairment: minor changes to speed, OR changes speed w/ significant deviations, deviates 10-15 in, OR  Changes speed , but loses balance & recovers   (0) Severe impairment: cannot change speed, deviates >15 in, or loses balance & needs assist  3. Gait with horizontal head turns  = 1   (3) Normal: no change in gait, deviates <6 in   (2) Mild impairment: slight change in speed, deviates 6-10 in, OR uses A.D.   (1) Moderate impairment: moderate change in speed, deviates 10-15 in   (0) Severe impairment: severe disruption of gait, deviates >15in  4. Gait with vertical head turns = 2   (3) Normal: no change in gait, deviates <6 in   (2) Mild impairment: slight change in speed, deviates 6-10 in OR uses A.D.   (1) Moderate impairment: moderate change in speed, deviates 10-15 in   (0) Severe impairment: severe disruption of gait, deviates >15 in  5. Gait with pivot turns = 2   (3) Normal: performs safely in 3 sec, no LOB   (2) Mild impairment: performs in >3 sec & no LOB, OR turns safely & requires several steps to regain LOB   (1) Moderate impairment: turns slow, OR requires several small steps for balance following turn & stop   (0) Severe impairment: cannot turn safely, needs assist  6. Step over obstacle = 3   (3) Normal: steps over 2  stacked boxes w/o change in speed or LOB   (2) Mild impairment: able to step over 1 box w/o change in speed or LOB   (1) Moderate impairment: steps over 1 box but must slow down, may require VC   (0) Severe impairment: cannot perform w/o assist  7. Gait with Narrow HANNAH = 2   (3) Normal: 10 steps no staggering   (2) Mild impairment: 7-9 steps   (1) Moderate impairment: 4-7 steps   (0) Severe impairment: < 4 steps or cannot perform w/o assist  8. Gait with eyes closed = 2   (3) Normal: < 7 sec, no A.D., no LOB, normal gait pattern, deviates <6 in   (2) Mild impairment: 7.1-9 sec, mild gait deviations, deviates 6-10 in   (1) Moderate impairment: > 9 sec, abnormal pattern, LOB, deviates 10-15 in   (0) Severe impairment: cannot perform w/o assist, LOB, deviates >15in  9. Ambulating Backwards = 2   (3) Normal: no A.D., no LOB, normal gait pattern, deviates <6in   (2) Mild impairment: uses A.D., slower speed, mild gait deviations, deviates 6-10 in   (1) Moderate impairment: slow speed, abnormal gait pattern, LOB, deviates 10-15 in   (0) Severe impairment: severe gait deviations or LOB, deviates >15in  10. Steps = 2   (3) Normal: alternating feet, no rail   (2) Mild Impairment: alternating feet, uses rail   (1) Moderate impairment: step-to, uses rail   (0) Severe impairment: cannot perform safely    Score 21/30     Score:   <22/30 fall risk   <20/30 fall risk in older adults   <18/30 fall risk in Parkinsons        Evaluation   Single Limb Stance R LE NT  (<10 sec = HIGH FALL RISK)   Single Limb Stance L LE NT  (<10 sec = HIGH FALL RISK)     Gait Assessment:  - AD used: none  - Assistance: CGA - SBA  - Distance: into/out of PT gym    GAIT DEVIATIONS:  Eden displays the following deviations with ambulation: no noticeable gait deviations appreciated on today; plan for continued evaluation for gait abnormalities in future sessions    Impairments contributing to deviations: N/A    Endurance Deficit: NT    POSITIONAL CANAL  TESTING: Deferred on today due to patient's subjective report of symptoms, presentation with oculomotor testing, and inability to assess vertebral artery integrity on today.    CMS Impairment/Limitation/Restriction for FOTO Survey    Therapist reviewed FOTO scores for Eden Kuhn on 3/1/2024.   FOTO documents entered into [employer] - see Media section.    FOTO Score: Not created prior to PT evaluation         TREATMENT     No treatment performed on evaluation date due to time constraints. Time spent gathering pertinent patient information and assessing deficits to formulate PT POC.    Home Exercises and Patient Education Provided    Education provided:   - Role of PT in improving gait, balance, endurance, and tolerance  - Establishment of PT POC and goals  - Potential benefit from referral to resume orthopedic PT to address cervical and right shoulder pain    Written Home Exercises Provided: To be administered by 3rd follow up visit to assess tolerance and response to PT interventions    Assessment   Eden is a 63 y.o. female referred to outpatient Physical Therapy with a medical diagnosis of Concussion with loss of consciousness, sequela and Imbalance. Patient presents with impairments in her saccades, convergence point, VOR, and VOR cancellation. Objective measures performed above also showed impairments in her dynamic balance and risk of falling with gait activities. Due to her impairments listed above, she has reported feeling off balance especially with vision eliminated tasks and like she is listing to her right when she is walking. Additionally, despite having received PT in the past to address her right clavicle fracture, she reports having continued pain in her neck and right shoulder which limits her cervical ROM and ability to functionally use her right arm at times. She is appropriate for skilled physical therapy interventions to address the above listed impairments with a goal of improving her  balance, gaze stabilization and motion tolerance. She would also benefit from referral to orthopedic PT vs OT to address her right shoulder and neck pain participation in the Modified Motion Sensitivity Quotient to further assess pt's reports of visual motion sensitivity with VOR cancellation testing.    Patient prognosis is Fair.   Patient will benefit from skilled outpatient Physical Therapy to address the deficits stated above and in the chart below, provide patient/family education, and to maximize patient's level of independence.     Plan of care discussed with patient: Yes  Patient's spiritual, cultural and educational needs considered and patient is agreeable to the plan of care and goals as stated below:     Anticipated Barriers for therapy: none at this time    Medical Necessity is demonstrated by the following  History  Co-morbidities and personal factors that may impact the plan of care [] LOW: no personal factors / co-morbidities  [] MODERATE: 1-2 personal factors / co-morbidities  [x] HIGH: 3+ personal factors / co-morbidities    Moderate / High Support Documentation: Cognitive communication disorder, hypothyroidism, EtOH dependence,     Examination  Body Structures and Functions, activity limitations and participation restrictions that may impact the plan of care [] LOW: addressing 1-2 elements  [] MODERATE: 3+ elements  [x] HIGH: 4+ elements (please support below)    Moderate / High Support Documentation: balance, gait, gaze stabilization, motion tolerance     Clinical Presentation [x] LOW: stable  [] MODERATE: Evolving  [] HIGH: Unstable     Decision Making/ Complexity Score: low       Goals:  Short Term Goals = Long Term Goals: 8 weeks   Pt will be independent with an individualized home exercise program.  Pt will perform saccades at >/= 110 bpm for 30 seconds without corrective movements noted to show improved environmental scanning ability.  Pt will improve convergence point to </= 10 cm without  symptom provocation.   Pt will perform horizontal VOR x1 at >/= 120 bpm for 30 seconds without symptom provocation to show improved gaze stabilization.  Pt will perform VOR cancellation with dizziness lasting </= 5 seconds to show improved visual motion tolerance.  Pt will improve Functional Gait Assessment (FGA) score to at least 23/30 for increased independence with home and community ambulation.   PT will administer Modified Motion Sensitivity Quotient to further assess pt's reports of visual motion sensitivity.       Plan   Plan of care Certification: 3/1/2024 to 4/26/2024.    Outpatient Physical Therapy 2 times weekly for 8 weeks to include the following interventions: Electrical Stimulation , Gait Training, Manual Therapy, Moist Heat/ Ice, Neuromuscular Re-ed, Orthotic Management and Training, Patient Education, Therapeutic Activities, and Therapeutic Exercise.     Erin Wing, PT

## 2024-03-14 ENCOUNTER — OFFICE VISIT (OUTPATIENT)
Dept: NEUROLOGY | Facility: CLINIC | Age: 64
End: 2024-03-14
Payer: COMMERCIAL

## 2024-03-14 VITALS
HEIGHT: 65 IN | WEIGHT: 117.75 LBS | SYSTOLIC BLOOD PRESSURE: 121 MMHG | BODY MASS INDEX: 19.62 KG/M2 | DIASTOLIC BLOOD PRESSURE: 83 MMHG | HEART RATE: 86 BPM | OXYGEN SATURATION: 98 %

## 2024-03-14 DIAGNOSIS — M54.2 CERVICALGIA OF OCCIPITO-ATLANTO-AXIAL REGION: ICD-10-CM

## 2024-03-14 DIAGNOSIS — S13.4XXD WHIPLASH INJURY TO NECK, SUBSEQUENT ENCOUNTER: ICD-10-CM

## 2024-03-14 DIAGNOSIS — G47.9 FATIGUE DUE TO SLEEP PATTERN DISTURBANCE: ICD-10-CM

## 2024-03-14 DIAGNOSIS — R26.89 IMBALANCE: ICD-10-CM

## 2024-03-14 DIAGNOSIS — R53.83 FATIGUE DUE TO SLEEP PATTERN DISTURBANCE: ICD-10-CM

## 2024-03-14 DIAGNOSIS — G44.86 CERVICOGENIC HEADACHE: ICD-10-CM

## 2024-03-14 DIAGNOSIS — S06.0X9S CONCUSSION WITH LOSS OF CONSCIOUSNESS, SEQUELA: Primary | ICD-10-CM

## 2024-03-14 DIAGNOSIS — M54.81 OCCIPITAL NEURITIS: ICD-10-CM

## 2024-03-14 DIAGNOSIS — R41.89 COGNITIVE CHANGE: ICD-10-CM

## 2024-03-14 PROCEDURE — 1160F RVW MEDS BY RX/DR IN RCRD: CPT | Mod: CPTII,S$GLB,, | Performed by: PSYCHIATRY & NEUROLOGY

## 2024-03-14 PROCEDURE — 99999 PR PBB SHADOW E&M-EST. PATIENT-LVL IV: CPT | Mod: PBBFAC,,, | Performed by: PSYCHIATRY & NEUROLOGY

## 2024-03-14 PROCEDURE — 3074F SYST BP LT 130 MM HG: CPT | Mod: CPTII,S$GLB,, | Performed by: PSYCHIATRY & NEUROLOGY

## 2024-03-14 PROCEDURE — 3008F BODY MASS INDEX DOCD: CPT | Mod: CPTII,S$GLB,, | Performed by: PSYCHIATRY & NEUROLOGY

## 2024-03-14 PROCEDURE — 99214 OFFICE O/P EST MOD 30 MIN: CPT | Mod: S$GLB,,, | Performed by: PSYCHIATRY & NEUROLOGY

## 2024-03-14 PROCEDURE — 3079F DIAST BP 80-89 MM HG: CPT | Mod: CPTII,S$GLB,, | Performed by: PSYCHIATRY & NEUROLOGY

## 2024-03-14 PROCEDURE — 1159F MED LIST DOCD IN RCRD: CPT | Mod: CPTII,S$GLB,, | Performed by: PSYCHIATRY & NEUROLOGY

## 2024-03-14 RX ORDER — PREDNISONE 10 MG/1
TABLET ORAL
Qty: 40 TABLET | Refills: 0 | Status: SHIPPED | OUTPATIENT
Start: 2024-03-14 | End: 2024-04-11

## 2024-03-14 NOTE — PATIENT INSTRUCTIONS
5 day prednisone taper prescribed: 100 mg (10 tabs), 90 mg (9 tabs), 80 mg (8 tabs), 70 mg (7 tabs), 60 mg (6 tabs). Split up doses with meals. Day 1: Take 4 tabs with breakfast, 3 tabs with lunch, 3 tabs with dinner. Day 2: Take 3 tabs with each meal. Day 3: Take 3 tabs with breakfast, 3 tabs with lunch, 2 tabs with dinner. Day 4: Take 3 tabs with breakfast, 2 tabs with lunch and dinner. Day 5: Take 2 tabs with each meal  Take first prednisone pill in lobby downstairs and if no symptoms after 30 minutes, may leave and continue rest of prednisone as above. If have side effects, stop prednisone and contact me immediately  Discontinue meloxicam   The patient was instructed to ice the occipital region for no more than 20 minutes at least once a day but may repeat this as many times as they would like.   Discussed ergonomic accommodations for occipital neuritis/neuralgia. Mainly perform all work at eye level to minimize continued neck flexion which will aggravate the nerve.  Patient was encouraged to do daily light cardio exercise but instructed to limit physical activities to walking, walking in water up to the waist only or riding a stationary bike, recumbent preferred. No weight lifting in upper body, no neck massage, no acupuncture of neck, and no dry needling of upper neck. No neck PT unless otherwise stated. If neck PT is recommended, the therapist may do joint manipulation at this time but no suboccipital soft tissue therapy. Any of your therapists may also do passive neck range of motion activities. No chiropractor work on neck. Lower body strengthening with resistant bands, leg machines, and strapping weights to legs okay. No core body workouts. No running or use of cardio equipment other than stationary bike. No swimming or body surfing. No amusement park rides. No lifting more than 5-10 lbs and bend at the knees, not the waist.  Discussed care plan in detail for post traumatic occipital neuritis including a  trial of oral medications followed by series of trigger point steroid injections with occipital nerve blocks. To be referred for consultation for occipital nerve release procedure if initially clinically responsive to injections but always with a return of symptoms.  Continue vestibular PT for balance and eye movements   Continue ST for cognition
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
## 2024-03-15 ENCOUNTER — CLINICAL SUPPORT (OUTPATIENT)
Dept: REHABILITATION | Facility: HOSPITAL | Age: 64
End: 2024-03-15
Payer: COMMERCIAL

## 2024-03-15 DIAGNOSIS — R41.841 COGNITIVE COMMUNICATION DISORDER: Primary | ICD-10-CM

## 2024-03-15 PROCEDURE — 97130 THER IVNTJ EA ADDL 15 MIN: CPT | Mod: PO | Performed by: SPEECH-LANGUAGE PATHOLOGIST

## 2024-03-15 PROCEDURE — 97129 THER IVNTJ 1ST 15 MIN: CPT | Mod: PO | Performed by: SPEECH-LANGUAGE PATHOLOGIST

## 2024-03-15 NOTE — PROGRESS NOTES
"[facility]  Speech Therapy Treatment Note- Neurological Rehabilitation     Date: 3/15/2024     Name: Eden Kuhn   MRN: [id number]   Therapy Diagnosis:   Encounter Diagnosis   Name Primary?    Cognitive communication disorder Yes     Physician: Austin Christian MD  Physician Orders: Ambulatory Referral to Speech Therapy   Medical Diagnosis:   S06.0X9S (ICD-10-CM) - Concussion with loss of consciousness, sequela   R41.89 (ICD-10-CM) - Cognitive change     Visit #/ Visits Authorized: 2/30 (plus evaluation)  Date of Evaluation:  2/20/24  Insurance Authorization Period: 2/20/24-12/31/24  Plan of Care Expiration Date:    4/5/24  Extended Plan of Care:  n/a   Progress Note: due 3/20/24     Time In:  1:49 pm   Time Out: 2:30 pm  Total Billable Time: 41 minutes     Precautions: Standard    Subjective   Patient reports:  that she didn't feel well last week so cancelled speech therapy appointment.  Feels a little "spinning" with new medications. Overhead lights off for session due to light sensitivity- used lamp.   She was not compliant to home exercise program - not yet established.   Response to previous treatment: good  Pain Scale: 0/10 on a Visual Analog Scale currently.  Pain Location: none    Objective     TIMED  Procedure Min.   Cognitive Therapeutic Interventions, first 15 minutes CPT 53551  15   Cognitive Therapeutic Interventions, each additional 15 minutes CPT 42470 26              Short Term Goals: (4 weeks) Current Progress:    Patient will complete alternating attention task with no more than 3 cues or 90% accuracy to improve alternating attention.    Progressing/ Not Met 3/15/2024   Constant Therapy spoken word n back level 1: 90% accuracy independently; 100% accuracy given minimum cues    Constant Therapy spoken word n back level 2: 80% accuracy independently; 100% accuracy given minimum cues   2. Patient will use Goal Plan Action Review strategy to complete moderate to " complex reasoning, planning, or organization tasks with 90% accuracy independently to improve functional executive function skills.   Progressing/ Not Met 3/15/2024   Discussed Goal Plan Action Review strategy at length - provided example of strategy use and encouraged patient to utilize this strategy when planning daily to do lists   3. Patient will complete short term recall tasks after a 5 minutes delay with 90% accuracy  independently  with use of memory strategies to improve recall of information and generalization of memory strategies.   Progressing/ Not Met 3/15/2024   Discussed memory at length - patient endorses that memory is improving.      4. Patient will complete high level problem solving based tasks with 90% accuracy independently.   Progressing/ Not Met 3/15/2024   Discussed skills needed to perform high level problem solving tasks effectively and promoting limited fatigue    5. Patient will complete mental manipulation tasks with 90% acc to improve working memory.   Progressing/ Not Met 3/15/2024   ABC order 3 units: 100% accuracy independently    ABC order 4 units: 100% accuracy independently    Word Progression 3 units: 100% accuracy independently   6. Patient will independently implement cognitive/sensory rest periods throughout day after identifying cognitive fatigue including limiting sensory input (sound, light, etc.)    Progressing/ Not Met 3/15/2024   2 minutes of mindfulness per day is helping to reduce cognitive fatigue per patient report    7. Patient will identify two solutions to functional daily problems with thought flexibility and minimal assistance for use of strategies.     Progressing/ Not Met 3/15/2024   Not formally addressed      8. Patient will complete a moderate to complex task to improve attention and memory (I.e. sample bill paying activity, recipe, or multiple choice comprehension questions to 1 paragraphs) with 80% accuracy and natural environment noise distractions (TV  "news background, music, etc.).   Progressing/ Not Met 3/15/2024   Not formally addressed              Patient Education and Home Program   Patient educated regarding the followin. Executive functioning strategies (I.e., goal, plan, action, review)   2. Memory and attention strategies discussed at length.  3. Plan of care and goals     Home program established: yes-review educational handouts/materials provided   Patient verbalized understanding to all above education provided.     See Electronic Medical Record under Patient Instructions for exercises provided throughout therapy.    Assessment   Eden  participated well today in today's session which focused on memory, alternating attention, meta-cognitive strategy training, mental manipulation, and education.  Good performance across therapy tasks despite light sensitivity and overall "spinning" sensation due to new medication.  Patient with excellent understanding of strategies discussed and was able to demonstrate good ability to apply strategies to daily life activities. Patient with good questions and engagement throughout session.  Cognitive, Physical, and Emotional fatigue was not believed to have been a barrier to the session.      Eden is progressing well towards her goals. Current goals remain appropriate. Goals to be updated as necessary.     Patient prognosis is Good. Patient will continue to benefit from skilled outpatient speech and language therapy to address the deficits listed in the problem list on initial evaluation, provide patient/family education and to maximize patient's level of independence in the home and community environment.   Medical necessity is demonstrated by the following IMPAIRMENTS:  Cognition: Deficits in executive functioning, attention, and memory prevent the pt from relaying medically and safety relevant information in a timely manner in a state of emergency.   Barriers to Therapy: none at this time  Patient's " spiritual, cultural and educational needs considered and patient agreeable to plan of care and goals.      Plan     Continue Plan of Care with focus on rehabilitation and compensation for cognitive-linguistic deficits.     ERI Jacobs, L-SLP, CCC-SLP  Speech Language Pathologist   3/15/2024

## 2024-03-18 ENCOUNTER — TELEPHONE (OUTPATIENT)
Dept: REHABILITATION | Facility: HOSPITAL | Age: 64
End: 2024-03-18
Payer: COMMERCIAL

## 2024-03-18 ENCOUNTER — DOCUMENTATION ONLY (OUTPATIENT)
Dept: REHABILITATION | Facility: HOSPITAL | Age: 64
End: 2024-03-18
Payer: COMMERCIAL

## 2024-03-18 NOTE — PROGRESS NOTES
No Show Note/Documentation    Patient: Eden Kuhn  Date of Session: 3/18/2024  Diagnosis: No diagnosis found.  MRN: [id number]    Eden Kuhn did not attend her scheduled therapy appointment today. She did not call to cancel nor reschedule. Next appointment is scheduled for 3/27/24 at 1:45 PM and will follow up with patient at that time. No charges have been posted today.     Cancel: 1   No show: 1     ERI Yin, CF-SLP  Speech-Language Pathology Resident   3/18/2024

## 2024-03-22 ENCOUNTER — CLINICAL SUPPORT (OUTPATIENT)
Dept: REHABILITATION | Facility: HOSPITAL | Age: 64
End: 2024-03-22
Payer: COMMERCIAL

## 2024-03-22 DIAGNOSIS — H81.8X9 DEFICIT OF VESTIBULO-OCULAR REFLEX: ICD-10-CM

## 2024-03-22 DIAGNOSIS — R26.89 BALANCE DISTURBANCE DUE TO OLD HEAD TRAUMA: Primary | ICD-10-CM

## 2024-03-22 DIAGNOSIS — H51.11 CONVERGENCE INSUFFICIENCY: ICD-10-CM

## 2024-03-22 DIAGNOSIS — S09.90XS BALANCE DISTURBANCE DUE TO OLD HEAD TRAUMA: Primary | ICD-10-CM

## 2024-03-22 DIAGNOSIS — H55.81 SACCADIC EYE MOVEMENT DEFICIENCY: ICD-10-CM

## 2024-03-22 PROCEDURE — 97112 NEUROMUSCULAR REEDUCATION: CPT | Mod: PO

## 2024-03-22 PROCEDURE — 97530 THERAPEUTIC ACTIVITIES: CPT | Mod: PO

## 2024-03-22 NOTE — PROGRESS NOTES
"[facility]   Physical Therapy Treatment Note      Name: Eden Kuhn  Clinic Number: [id number]    Therapy Diagnosis:   Encounter Diagnoses   Name Primary?    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 Yes    Saccadic eye movement deficiency     Convergence insufficiency     Deficit of vestibulo-ocular reflex      Physician: Austin Christian MD    Visit Date: 3/22/2024     Physician Orders: PT Eval and Treat; Vestibular Therapy  Medical Diagnosis from Referral: Concussion with loss of consciousness, sequela [S06.0X9S]   Imbalance [R26.89]   Evaluation Date: 3/1/2024  Authorization Period Expiration: 12/31/2024  Plan of Care Expiration: 4/26/24  Visit # / Visits authorized: 1/20     Time In: 13:45  Time Out: 14:25  Total Billable Time: 40 minutes     Precautions: Standard    PTA Visit #: 0/5       Subjective     Patient reports: She had a bad headache about 3 days ago, but is feeling better today.  Practicing some balance activity at home.  She was not given HEP   Response to previous treatment: eval only  Functional change: on going    Pain: 0/10  Location: headache    Objective      Objective Measures updated at progress report unless specified.     Treatment     Eden received the treatments listed below:      therapeutic exercises to develop strength, endurance, and posture for 0 minutes including:      neuromuscular re-education activities to improve: Balance, Coordination, and Proprioception for 30 minutes. The following activities were included:  X 10 convergence with "X"     2 X 30 sec VOR x 1 horizontal, seated, "X" target, self selected speed  2 X 30 sec VOR x 1 vertical, seated, "X" target,   self selected speed, + dizziness    2 x 30 sec saccades, horizontal, "X target"   2 x 30 sec saccades, vertical, "X target"     At ballet bar:  X 30 sec feet together with head rotation  X 30 sec feet together with eyes closed   On foam:  X 30 sec feet together stabilization  2 X " 30 sec feet together with head rotation  2 X 30 sec feet together with head nods  2 X 30 sec feet together with eyes closed + dizziness     therapeutic activities to improve functional performance for 10  minutes, includin feet x 2 walking with head turns  100 feet x 2 walking with head nods  50 feet x 2 backward walking  50 feet x 2 narrowed base of support walking       Patient Education and Home Exercises       Education provided:   - benefits of todays interventions, HEP     Written Home Exercises Provided: yes. Exercises were reviewed and Eden was able to demonstrate them prior to the end of the session.  Eden demonstrated good  understanding of the education provided. See Electronic Medical Record under Patient Instructions for exercises provided during therapy sessions    Assessment     Eden participated well in todays session.  She demo's improved convergence from initial eval, about 8 cm distance, and able to tolerate 10 trials of pencil push ups.  She was able to progress to static and dynamic balance today with appropriate challenge.  Took a couple seated rest breaks secondary to fatigue and lower extremity cramping.  She remains appropriate for skilled Physical Therapy.      Eden Is progressing well towards her goals.   Patient prognosis is Good.     Patient will continue to benefit from skilled outpatient physical therapy to address the deficits listed in the problem list box on initial evaluation, provide pt/family education and to maximize pt's level of independence in the home and community environment.     Patient's spiritual, cultural and educational needs considered and pt agreeable to plan of care and goals.     Anticipated barriers to physical therapy: none    Goals:   Short Term Goals = Long Term Goals: 8 weeks   Pt will be independent with an individualized home exercise program.  Pt will perform saccades at >/= 110 bpm for 30 seconds without corrective movements noted  to show improved environmental scanning ability.  Pt will improve convergence point to </= 10 cm without symptom provocation.   Pt will perform horizontal VOR x1 at >/= 120 bpm for 30 seconds without symptom provocation to show improved gaze stabilization.  Pt will perform VOR cancellation with dizziness lasting </= 5 seconds to show improved visual motion tolerance.  Pt will improve Functional Gait Assessment (FGA) score to at least 23/30 for increased independence with home and community ambulation.   PT will administer Modified Motion Sensitivity Quotient to further assess pt's reports of visual motion sensitivity.     Plan     Cont to progress vestibular and balance activity    Eden Estrada, PT

## 2024-03-27 ENCOUNTER — CLINICAL SUPPORT (OUTPATIENT)
Dept: REHABILITATION | Facility: HOSPITAL | Age: 64
End: 2024-03-27
Payer: COMMERCIAL

## 2024-03-27 DIAGNOSIS — R41.841 COGNITIVE COMMUNICATION DISORDER: Primary | ICD-10-CM

## 2024-03-27 PROCEDURE — 97130 THER IVNTJ EA ADDL 15 MIN: CPT | Mod: PO

## 2024-03-27 PROCEDURE — 97129 THER IVNTJ 1ST 15 MIN: CPT | Mod: PO

## 2024-03-27 NOTE — PROGRESS NOTES
[facility]  Speech Therapy Treatment Note- Neurological Rehabilitation     Date: 3/27/2024     Name: Eden Kuhn   MRN: [id number]   Therapy Diagnosis:   Encounter Diagnosis   Name Primary?    Cognitive communication disorder Yes     Physician: Austin Christian MD  Physician Orders: Ambulatory Referral to Speech Therapy   Medical Diagnosis:   S06.0X9S (ICD-10-CM) - Concussion with loss of consciousness, sequela   R41.89 (ICD-10-CM) - Cognitive change     Visit #/ Visits Authorized: 3/30 (plus evaluation)  Date of Evaluation:  2/20/24  Insurance Authorization Period: 2/20/24-12/31/24  Plan of Care Expiration Date:    4/5/24  Extended Plan of Care:  n/a   Progress Note: due 3/20/24     Time In:  1:45 pm   Time Out: 2:30 pm  Total Billable Time: 45 minutes     Precautions: Standard    Subjective   Patient reports:severe headache started about 30 minutes ago with no relief. Took Motrin at onset of headache with icing on the site. Driving in neighborhood only.  Lights turn off in office to help with headache.     She was not compliant to home exercise program - not yet established.   Response to previous treatment: good  Pain Scale: 6/10 on a Visual Analog Scale currently.  Pain Location: right side of head  before therapy; after therapy 3/10      Objective     TIMED  Procedure Min.   Cognitive Therapeutic Interventions, first 15 minutes CPT 59593  15   Cognitive Therapeutic Interventions, each additional 15 minutes CPT 80107 30               Short Term Goals: (4 weeks) Current Progress:    Patient will complete alternating attention task with no more than 3 cues or 90% accuracy to improve alternating attention.      Progressing/  3/27/2024   Constant Therapy (alternate uppercase/lowercase words in alphabetical order)  level 2: 97% accuracy independently      Met x 1     2. Patient will use Goal Plan Action Review strategy to complete moderate to complex reasoning, planning, or  organization tasks with 90% accuracy independently to improve functional executive function skills.     Progressing/ Not Met 3/27/2024   Discussed Goal Plan Action Review strategy at length again - provided example of strategy use and encouraged patient to utilize this strategy when planning daily to do lists         3. Patient will complete short term recall tasks after a 5 minutes delay with 90% accuracy  independently  with use of memory strategies to improve recall of information and generalization of memory strategies.   Progressing/  3/27/2024   Discussed memory at length  again and strategies to use- patient endorses that memory is improving.        4. Patient will complete high level problem solving based tasks with 90% accuracy independently.   Progressing/ Not Met 3/27/2024   Not formally addressed  but discussed using strategies and incorporate cognitive rest breaks and using the spoon theory.      5. Patient will complete mental manipulation tasks with 90% acc to improve working memory.     Progressing/ Not Met 3/27/2024   Constant Therapy (alternate uppercase/lowercase words in alphabetical order)  level 2: 97% accuracy independently  Met x 1    6. Patient will independently implement cognitive/sensory rest periods throughout day after identifying cognitive fatigue including limiting sensory input (sound, light, etc.)    Progressing/ Not Met 3/27/2024   Reviewed mindfulness per day is helping to reduce cognitive fatigue per patient report. Patient reported using mindfulness when she is in pain. Encouraged her to perform daily.      7. Patient will identify two solutions to functional daily problems with thought flexibility and minimal assistance for use of strategies.       Progressing/ Not Met 3/27/2024   Patient discussed goal to going to [employer] everyday for the 2 weekends. She will rest at a friend's house to take a break throughout the day.     During the day when she has a headache, she takes  her medicine and puts ice on her head.      8. Patient will complete a moderate to complex task to improve attention and memory (I.e. sample bill paying activity, recipe, or multiple choice comprehension questions to 1 paragraphs) with 80% accuracy and natural environment noise distractions (TV news background, music, etc.).   Progressing/  3/27/2024   Constant Therapy (alternate uppercase/lowercase words in alphabetical order)  level 2: 97% accuracy independently with typing in the background.     Met x 1           Patient Education and Home Program   Patient educated regarding the followin. Executive functioning strategies (I.e., goal, plan, action, review)   2. Memory and attention strategies discussed at length.  3. Plan of care and goals     Home program established: yes-review educational handouts/materials provided   Patient verbalized understanding to all above education provided.     See Electronic Medical Record under Patient Instructions for exercises provided throughout therapy.    Assessment   Eden  participated well today in today's session which focused on memory, alternating attention, meta-cognitive strategy training, mental manipulation, and education.    Good performance across therapy tasks despite overall headache.Overhead light turned off and lamp was turned on.   Patient with excellent understanding of strategies discussed and was able to demonstrate good ability to apply strategies to daily life activities. Patient with good questions and engagement throughout session.  Cognitive, Physical, and Emotional fatigue was not believed to have been a barrier to the session.      Eden is progressing well towards her goals. Current goals remain appropriate. Goals to be updated as necessary. Patient felt like speech therapy is helping and she wants to continue. Consider extension of Plan of Care vs discharge on 24.    Patient prognosis is Good. Patient will continue to benefit from  skilled outpatient speech and language therapy to address the deficits listed in the problem list on initial evaluation, provide patient/family education and to maximize patient's level of independence in the home and community environment.   Medical necessity is demonstrated by the following IMPAIRMENTS:  Cognition: Deficits in executive functioning, attention, and memory prevent the pt from relaying medically and safety relevant information in a timely manner in a state of emergency.   Barriers to Therapy: none at this time  Patient's spiritual, cultural and educational needs considered and patient agreeable to plan of care and goals.      Plan     Continue Plan of Care with focus on rehabilitation and compensation for cognitive-linguistic deficits.     ARLENE Prasad., CCC-SLP, CBIS  Speech-Language Pathologist  Certified Brain Injury Specialist

## 2024-04-03 ENCOUNTER — CLINICAL SUPPORT (OUTPATIENT)
Dept: REHABILITATION | Facility: HOSPITAL | Age: 64
End: 2024-04-03
Payer: COMMERCIAL

## 2024-04-03 DIAGNOSIS — H81.8X9 DEFICIT OF VESTIBULO-OCULAR REFLEX: ICD-10-CM

## 2024-04-03 DIAGNOSIS — S09.90XS BALANCE DISTURBANCE DUE TO OLD HEAD TRAUMA: Primary | ICD-10-CM

## 2024-04-03 DIAGNOSIS — H51.11 CONVERGENCE INSUFFICIENCY: ICD-10-CM

## 2024-04-03 DIAGNOSIS — H55.81 SACCADIC EYE MOVEMENT DEFICIENCY: ICD-10-CM

## 2024-04-03 DIAGNOSIS — R26.89 BALANCE DISTURBANCE DUE TO OLD HEAD TRAUMA: Primary | ICD-10-CM

## 2024-04-03 PROCEDURE — 97530 THERAPEUTIC ACTIVITIES: CPT | Mod: PO

## 2024-04-03 PROCEDURE — 97112 NEUROMUSCULAR REEDUCATION: CPT | Mod: PO

## 2024-04-03 NOTE — PROGRESS NOTES
"[facility]   Physical Therapy Treatment Note      Name: Eden Kuhn  Clinic Number: [id number]    Therapy Diagnosis:   Encounter Diagnoses   Name Primary?    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 Yes    Saccadic eye movement deficiency     Convergence insufficiency     Deficit of vestibulo-ocular reflex        Physician: Austin Christian MD    Visit Date: 4/3/2024     Physician Orders: PT Eval and Treat; Vestibular Therapy  Medical Diagnosis from Referral: Concussion with loss of consciousness, sequela [S06.0X9S]   Imbalance [R26.89]   Evaluation Date: 3/1/2024  Authorization Period Expiration: 12/31/2024  Plan of Care Expiration: 4/26/24  Visit # / Visits authorized: 2/20     Time In: 1306  Time Out: 1352  Total Billable Time: 46 minutes     Precautions: Standard    PTA Visit #: 0/5       Subjective     Patient reports: doing okay today. She denies dizziness and headache upon arrival.   She was not given HEP   Response to previous treatment: a lot of neck soreness the next day.  Functional change: on going    Pain: 0/10  Location: headache    Objective      Objective Measures updated at progress report unless specified.     Treatment     Eden received the treatments listed below:      therapeutic exercises to develop strength, endurance, and posture for 0 minutes including:      neuromuscular re-education activities to improve: Balance, Coordination, and Proprioception for 38 minutes. The following activities were included:    3 x 5 reps, Pencil Push ups using 14 pt "Q" - mild dizziness    3 X 30 sec VOR x 1 horizontal, seated, "X" target, self selected speed - difficulty maintaining gaze  3 X 30 sec VOR x 1 vertical, seated, "X" target,   self selected speed - difficulty maintaining gaze    2 x 45 sec saccades (Spot it cards), horizontal  2 x 45 sec saccades (Spot it cards, vertical    On foam:  3 X 30 sec feet together with eyes closed     3 x 30s each LE back, Tandem " stance - eyes open    therapeutic activities to improve functional performance for 8 minutes, includin feet x 2 walking with head turns  100 feet x 2 walking with head nods  100 feet x 2 laps, eyes closed ambulation        Patient Education and Home Exercises       Education provided:   - benefits of todays interventions, HEP     Written Home Exercises Provided: yes. Exercises were reviewed and Eden was able to demonstrate them prior to the end of the session.  Eden demonstrated good  understanding of the education provided. See Electronic Medical Record under Patient Instructions for exercises provided during therapy sessions    Assessment   Eden participated well in today's session. She demonstrated difficulty with VOR interventions; PT observed difficulty maintaining her gaze on the target. She demonstrated good stability with static and dynamic balance tasks. Spot it cards were used to make saccade trials more functional for scanning. PT to continue per established plan of care.     Eden Is progressing well towards her goals.   Patient prognosis is Good.     Patient will continue to benefit from skilled outpatient physical therapy to address the deficits listed in the problem list box on initial evaluation, provide pt/family education and to maximize pt's level of independence in the home and community environment.     Patient's spiritual, cultural and educational needs considered and pt agreeable to plan of care and goals.     Anticipated barriers to physical therapy: none    Goals:   Short Term Goals = Long Term Goals: 8 weeks   Pt will be independent with an individualized home exercise program. Ongoing  Pt will perform saccades at >/= 110 bpm for 30 seconds without corrective movements noted to show improved environmental scanning ability. Ongoing  Pt will improve convergence point to </= 10 cm without symptom provocation. Ongoing  Pt will perform horizontal VOR x1 at >/= 120 bpm for  30 seconds without symptom provocation to show improved gaze stabilization. Ongoing  Pt will perform VOR cancellation with dizziness lasting </= 5 seconds to show improved visual motion tolerance. Ongoing  Pt will improve Functional Gait Assessment (FGA) score to at least 23/30 for increased independence with home and community ambulation. Ongoing  PT will administer Modified Motion Sensitivity Quotient to further assess pt's reports of visual motion sensitivity. Ongoing    Plan     Cont to progress vestibular and balance activity    Nicki Mcgraw PT

## 2024-04-04 NOTE — PROGRESS NOTES
[facility]  Speech Therapy PROGRESS Note- Neurological Rehabilitation / Discharge Summary     Date: 4/5/2024     Name: Eden Kuhn   MRN: [id number]   Therapy Diagnosis:   Encounter Diagnosis   Name Primary?    Cognitive communication disorder Yes     Physician: Austin Christian MD  Physician Orders: Ambulatory Referral to Speech Therapy   Medical Diagnosis:   S06.0X9S (ICD-10-CM) - Concussion with loss of consciousness, sequela   R41.89 (ICD-10-CM) - Cognitive change     Visit #/ Visits Authorized: 4/30 (plus evaluation)  Date of Evaluation:  2/20/24  Last Date of Treatment: 4/5/2024   Insurance Authorization Period: 2/20/24-12/31/24  Plan of Care Expiration Date:    4/5/24; patient discharged today   Extended Plan of Care:  n/a   Progress Note: completed today      Time In:  11:19 am    Time Out: 12:00 pm   Total Billable Time: 41 minutes     Precautions: Standard    Subjective   Patient reports: No changes. No headache today. Had a headache yesterday. Appointment with Dr. Christian next week. Patient feels like she has made tremendous gains and feels much closer to her baseline - she feels like she has learned helpful strategies to enhance cognitive skills and decrease cognitive fatigue. Biggest barrier continues to be high intensity headaches.   She was compliant to home exercise program   Response to previous treatment: good  Pain Scale: no pain indicated throughout session      Objective     TIMED  Procedure Min.   Cognitive Therapeutic Interventions, first 15 minutes CPT 17026  15   Cognitive Therapeutic Interventions, each additional 15 minutes CPT 77537 26                Short Term Goals: (4 weeks) Current Progress:    Patient will complete alternating attention task with no more than 3 cues or 90% accuracy to improve alternating attention.    Patient endorses that attention in general has improved and that especially alternating attention/shifting attention has improved.  Patient feels like this will still cause cognitive fatigue if she doesn't alternate with kobi of strategies  Goal Met / Discontinue     2. Patient will use Goal Plan Action Review strategy to complete moderate to complex reasoning, planning, or organization tasks with 90% accuracy independently to improve functional executive function skills.    Discussed Goal Plan Action Review strategy at length again - patient uses portions of goal, plan, action, review which is beneficial per patient report. Suggested adding difficulty level and time expected to complete on to do list next to specific tasks  Goal Partially Met / Discontinue     3. Patient will complete short term recall tasks after a 5 minutes delay with 90% accuracy  independently  with use of memory strategies to improve recall of information and generalization of memory strategies.    Patient endorses that memory is improving through utilization of memory strategies   Goal Met / Discontinue         4. Patient will complete high level problem solving based tasks with 90% accuracy independently.   Progressing/ Not Met 4/5/2024   Not formally addressed but discussed using strategies and incorporate cognitive rest breaks and using the spoon theory. Goal Partially Met / Discontinue       5. Patient will complete mental manipulation tasks with 90% acc to improve working memory.     Progressing/ Not Met 4/5/2024   Not formally addressed    Goal Partially Met / Discontinue      6. Patient will independently implement cognitive/sensory rest periods throughout day after identifying cognitive fatigue including limiting sensory input (sound, light, etc.)    Progressing/ Not Met 4/5/2024   Reviewed mindfulness each day is helping to reduce cognitive fatigue per patient report. Goal Met / Discontinue     7. Patient will identify two solutions to functional daily problems with thought flexibility and minimal assistance for use of strategies.       Not formally addressed     Goal Partially Met / Discontinue       8. Patient will complete a moderate to complex task to improve attention and memory (I.e. sample bill paying activity, recipe, or multiple choice comprehension questions to 1 paragraphs) with 80% accuracy and natural environment noise distractions (TV news background, music, etc.).   Progressing/  2024   Not formally addressed    Goal Met / Discontinue             Long Term Goals: (6 weeks) Current Progress:   1. Patient will improve  attention skills to effectively attend to and communicate in complex daily living tasks in functional living environment.     Goal Met / Discontinue     2. Patient will demonstrate use of  goal setting, planning,  initiation, and  self-monitoring during daily living activities to improve safety and awareness in functional living environment.    Goal Met / Discontinue     3. Patient will apply problem-solving strategies with no visual support in daily living functional activities at home and in the community.     Goal Met / Discontinue     4. Patient will predict objective performance on completion of actual tasks to increase independence with required return to daily living environment.     Goal Met / Discontinue     5. Patient will use appropriate memory strategies to schedule and recall weekly activities, express needs and recall names to maintain safety and participate socially in functional living environment.     Goal Met / Discontinue           Patient Education and Home Program   Patient educated regarding the followin. Executive functioning strategies (I.e., goal, plan, action, review)   2. Memory and attention strategies discussed at length.  3. Plan of care and goals   4. Progress since start of care and discharge    Home program established: yes-review educational handouts/materials provided   Patient verbalized understanding to all above education provided.     See Electronic Medical Record under Patient Instructions for  exercises provided throughout therapy.    Assessment   Assessment of Current Status:   Eden participated well today in today's session which focused on education regarding memory, alternating attention, problem solving, meta-cognitive strategy training, mental manipulation, and word finding strategies. Patient endorses that she feels shan she continues to improve in all areas and feels like she is close to her baseline. Patient endorses that biggest limitation is intensity of headaches.Patient feels like she is ready for discharge from skilled speech therapy services and endorses that she has learned a lot regarding management of cognitive symptoms and cognitive fatigue. Cognitive, Physical, and Emotional fatigue was not believed to have been a barrier to the session.   Overall, patient continues with good progress towards established goals and has benefited from  skilled ST services. Patient has met 8/8 short term goals throughout current plan of care. Patient is appropriate for discharge from skilled speech therapy services at this time.    Patient prognosis is Good.      Medical necessity is demonstrated by the following IMPAIRMENTS:  Cognition: Deficits in executive functioning, attention, and memory prevent the pt from relaying medically and safety relevant information in a timely manner in a state of emergency.   Barriers to Therapy: none at this time  Patient's spiritual, cultural and educational needs considered and patient agreeable to plan of care and goals.    Discharge reason: Patient has met all of his/her goals    Plan   This patient is discharged from Speech Therapy     ERI Jacobs, L-SLP, CCC-SLP  Speech Language Pathologist   4/5/2024

## 2024-04-04 NOTE — PROGRESS NOTES
"  [facility]   Physical Therapy Treatment Note      Name: Eden Kuhn  Clinic Number: [id number]    Therapy Diagnosis:   Encounter Diagnoses   Name Primary?    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 Yes    Saccadic eye movement deficiency     Convergence insufficiency     Deficit of vestibulo-ocular reflex      Physician: Austin Christian MD    Visit Date: 4/5/2024     Physician Orders: PT Eval and Treat; Vestibular Therapy  Medical Diagnosis from Referral: Concussion with loss of consciousness, sequela [S06.0X9S]   Imbalance [R26.89]   Evaluation Date: 3/1/2024  Authorization Period Expiration: 12/31/2024  Plan of Care Expiration: 4/26/24  Visit # / Visits authorized: 3/20     Time In: 1202  Time Out: 1243  Total Billable Time: 41 minutes     Precautions: Standard    PTA Visit #: 0/5     Subjective     Patient reports: She is having a lot of neck pain on today. States that her headaches have been fewer in intensity and less frequent. As recently as last week she started having right sided nose bleeds.     She was not given HEP   Response to previous treatment: a lot of neck soreness the next day.  Functional change: on going    Pain: 5/10  Location: neck pain    Objective      Objective Measures updated at progress report unless specified.     Visual/Oculomotor Screen: denies changes   Saccades: Impaired: able to perform at 100 bpm with occasional difficulty maintaining pace but no headache or dizziness  Convergence: Intact at 2.25" (5.72 cm)  VOR cancellation: Intact    VOR 1: Impaired: at 110 bpm and 90 bpm mostly due to pain; reported dizziness when attempted at 110 bpm      Functional Gait Assessment:   1. Gait on level surface =  2 (6 s)   (3) Normal: less than 5.5 sec, no A.D., no imbalance, normal gait pattern, deviates< 6in   (2) Mild impairment: 7-5.6 sec, uses A.D., mild gait deviations, or deviates 6-10 in   (1) Moderate impairment: > 7 sec, slow speed, imbalance, " deviates 10-15 in.   (0) Severe impairment: needs assist, deviates >15 in, reach/touch wall  2. Change in Gait Speed = 3   (3) Normal: smooth change w/o loss of balance or gait deviation, deviates < 6 in, significant difference between speeds   (2) Mild impairment: changes speed, but demonstrates mild gait deviations, deviates 6-10 in, OR no deviations but unable to significantly speed, OR uses A.D.   (1) Moderate impairment: minor changes to speed, OR changes speed w/ significant deviations, deviates 10-15 in, OR  Changes speed , but loses balance & recovers   (0) Severe impairment: cannot change speed, deviates >15 in, or loses balance & needs assist  3. Gait with horizontal head turns  = 2   (3) Normal: no change in gait, deviates <6 in   (2) Mild impairment: slight change in speed, deviates 6-10 in, OR uses A.D.   (1) Moderate impairment: moderate change in speed, deviates 10-15 in   (0) Severe impairment: severe disruption of gait, deviates >15in  4. Gait with vertical head turns = 3   (3) Normal: no change in gait, deviates <6 in   (2) Mild impairment: slight change in speed, deviates 6-10 in OR uses A.D.   (1) Moderate impairment: moderate change in speed, deviates 10-15 in   (0) Severe impairment: severe disruption of gait, deviates >15 in  5. Gait with pivot turns = 3 (mild dizziness)   (3) Normal: performs safely in 3 sec, no LOB   (2) Mild impairment: performs in >3 sec & no LOB, OR turns safely & requires several steps to regain LOB   (1) Moderate impairment: turns slow, OR requires several small steps for balance following turn & stop   (0) Severe impairment: cannot turn safely, needs assist  6. Step over obstacle = 3   (3) Normal: steps over 2 stacked boxes w/o change in speed or LOB   (2) Mild impairment: able to step over 1 box w/o change in speed or LOB   (1) Moderate impairment: steps over 1 box but must slow down, may require VC   (0) Severe impairment: cannot perform w/o assist  7. Gait with  Narrow HANNAH = 3   (3) Normal: 10 steps no staggering   (2) Mild impairment: 7-9 steps   (1) Moderate impairment: 4-7 steps   (0) Severe impairment: < 4 steps or cannot perform w/o assist  8. Gait with eyes closed = 1   (3) Normal: < 7 sec, no A.D., no LOB, normal gait pattern, deviates <6 in   (2) Mild impairment: 7.1-9 sec, mild gait deviations, deviates 6-10 in   (1) Moderate impairment: > 9 sec, abnormal pattern, LOB, deviates 10-15 in   (0) Severe impairment: cannot perform w/o assist, LOB, deviates >15in  9. Ambulating Backwards = 2   (3) Normal: no A.D., no LOB, normal gait pattern, deviates <6in   (2) Mild impairment: uses A.D., slower speed, mild gait deviations, deviates 6-10 in   (1) Moderate impairment: slow speed, abnormal gait pattern, LOB, deviates 10-15 in   (0) Severe impairment: severe gait deviations or LOB, deviates >15in  10. Steps = 3   (3) Normal: alternating feet, no rail   (2) Mild Impairment: alternating feet, uses rail   (1) Moderate impairment: step-to, uses rail   (0) Severe impairment: cannot perform safely    Score 25/30     Score:   <22/30 fall risk   <20/30 fall risk in older adults   <18/30 fall risk in Parkinsons         Modified Motion Sensitivity Test: (All conditions performed standing facing a non-busy environment)  Movement Intensity (0-10) Duration  <5s=0  5-10s=1  11-20s = 2  21-30s = 3  >30s = 4 Score   5x Horizontal head turns 0 0 0   2. 5x Vertical Head turns 3 2 5   3. 5x Right diagonal head turns (upper left down to right) 0 0 0   4. 5x Left diagonal head turns (upper right down to left) 0 0 0   5. 5x Trunk bends (bending knees reaching to floor) 0 0 0   6. 5x Right quarter body turns (Look over right shoulder with trunk rotation, feet planted) 0 0 0   7. 5x Left quarter body turns (Look over left shoulder with trunk rotation, feet planted) 2 0 2   8. 1x 360 degree turn to the right  0 0 0   9. 1x 360 degree turn to the left 2 3 5   10. 5x VOR cancellation (follow thumbs  horizontally with head/trunk rotation x45 degrees each way) 0 0 0   Total score 7 5 12     mMSQ = Total score x (# of positions) / 14.00 = 2.57% mild motion sensitivity impairment      Treatment     Eden received the treatments listed below:      therapeutic exercises to develop strength, endurance, and posture for 00 minutes including:    NP      neuromuscular re-education activities to improve: Balance, Coordination, and Proprioception for 00 minutes. The following activities were included:    NP      therapeutic activities to improve functional performance for 41 minutes, including:    Objective testing listed above        Patient Education and Home Exercises       Education provided:   - benefits of todays interventions, HEP     Written Home Exercises Provided: yes. Exercises were reviewed and Eden was able to demonstrate them prior to the end of the session.  Eden demonstrated good  understanding of the education provided. See Electronic Medical Record under Patient Instructions for exercises provided during therapy sessions    Assessment     Eden Kuhn tolerated today's session well with a focus on reassessment of progress towards goals. She was able to tolerate performing the outcome measures listed above with improvements noted in her oculomotor movements/reflexes and dynamic balance. She was also able to tolerate performing the Modified Motion Sensitivity Quotient revealing a mild motion sensitivity intolerance that has not yet been compensated for. To date, she has met 4/7 short and long-term goals established at her initial evaluation and prior progress notes. She remains appropriate for skilled physical therapy services at their current frequency for the remainder of the plan of care to continue addressing her functional deficits and goals related to improved motion and overall activity tolerance.     Eden Is progressing well towards her goals.   Patient prognosis is Good.      Patient will continue to benefit from skilled outpatient physical therapy to address the deficits listed in the problem list box on initial evaluation, provide pt/family education and to maximize pt's level of independence in the home and community environment.     Patient's spiritual, cultural and educational needs considered and pt agreeable to plan of care and goals.     Anticipated barriers to physical therapy: none    Goals:   Short Term Goals = Long Term Goals: 8 weeks   Pt will be independent with an individualized home exercise program. Ongoing  Pt will perform saccades at >/= 110 bpm for 30 seconds without corrective movements noted to show improved environmental scanning ability. Progressing  Pt will improve convergence point to </= 10 cm without symptom provocation. Met 4/5/24  Pt will perform horizontal VOR x1 at >/= 120 bpm for 30 seconds without symptom provocation to show improved gaze stabilization. Ongoing  Pt will perform VOR cancellation with dizziness lasting </= 5 seconds to show improved visual motion tolerance. Met 4/5/24  Pt will improve Functional Gait Assessment (FGA) score to at least 23/30 for increased independence with home and community ambulation. Met 4/5/24  PT will administer Modified Motion Sensitivity Quotient to further assess pt's reports of visual motion sensitivity. Met 4/5/24 (no new goal necessary due to score)    Plan     Cont to progress vestibular and balance activity    Erin Wing, PT

## 2024-04-05 ENCOUNTER — CLINICAL SUPPORT (OUTPATIENT)
Dept: REHABILITATION | Facility: HOSPITAL | Age: 64
End: 2024-04-05
Payer: COMMERCIAL

## 2024-04-05 DIAGNOSIS — H81.8X9 DEFICIT OF VESTIBULO-OCULAR REFLEX: ICD-10-CM

## 2024-04-05 DIAGNOSIS — H55.81 SACCADIC EYE MOVEMENT DEFICIENCY: ICD-10-CM

## 2024-04-05 DIAGNOSIS — R26.89 BALANCE DISTURBANCE DUE TO OLD HEAD TRAUMA: Primary | ICD-10-CM

## 2024-04-05 DIAGNOSIS — H51.11 CONVERGENCE INSUFFICIENCY: ICD-10-CM

## 2024-04-05 DIAGNOSIS — R41.841 COGNITIVE COMMUNICATION DISORDER: Primary | ICD-10-CM

## 2024-04-05 DIAGNOSIS — S09.90XS BALANCE DISTURBANCE DUE TO OLD HEAD TRAUMA: Primary | ICD-10-CM

## 2024-04-05 PROCEDURE — 97130 THER IVNTJ EA ADDL 15 MIN: CPT | Mod: PO | Performed by: SPEECH-LANGUAGE PATHOLOGIST

## 2024-04-05 PROCEDURE — 97129 THER IVNTJ 1ST 15 MIN: CPT | Mod: PO | Performed by: SPEECH-LANGUAGE PATHOLOGIST

## 2024-04-05 PROCEDURE — 97530 THERAPEUTIC ACTIVITIES: CPT | Mod: PO

## 2024-04-11 ENCOUNTER — OFFICE VISIT (OUTPATIENT)
Dept: NEUROLOGY | Facility: CLINIC | Age: 64
End: 2024-04-11
Payer: COMMERCIAL

## 2024-04-11 DIAGNOSIS — G44.86 CERVICOGENIC HEADACHE: ICD-10-CM

## 2024-04-11 DIAGNOSIS — G47.9 FATIGUE DUE TO SLEEP PATTERN DISTURBANCE: ICD-10-CM

## 2024-04-11 DIAGNOSIS — R53.83 FATIGUE DUE TO SLEEP PATTERN DISTURBANCE: ICD-10-CM

## 2024-04-11 DIAGNOSIS — M54.81 OCCIPITAL NEURITIS: ICD-10-CM

## 2024-04-11 DIAGNOSIS — S13.4XXD WHIPLASH INJURY TO NECK, SUBSEQUENT ENCOUNTER: ICD-10-CM

## 2024-04-11 DIAGNOSIS — S06.0X9S CONCUSSION WITH LOSS OF CONSCIOUSNESS, SEQUELA: Primary | ICD-10-CM

## 2024-04-11 DIAGNOSIS — M54.2 CERVICALGIA OF OCCIPITO-ATLANTO-AXIAL REGION: ICD-10-CM

## 2024-04-11 PROCEDURE — 99999 PR PBB SHADOW E&M-EST. PATIENT-LVL III: CPT | Mod: PBBFAC,,, | Performed by: PSYCHIATRY & NEUROLOGY

## 2024-04-11 PROCEDURE — 1160F RVW MEDS BY RX/DR IN RCRD: CPT | Mod: CPTII,S$GLB,, | Performed by: PSYCHIATRY & NEUROLOGY

## 2024-04-11 PROCEDURE — 99214 OFFICE O/P EST MOD 30 MIN: CPT | Mod: S$GLB,,, | Performed by: PSYCHIATRY & NEUROLOGY

## 2024-04-11 PROCEDURE — 1159F MED LIST DOCD IN RCRD: CPT | Mod: CPTII,S$GLB,, | Performed by: PSYCHIATRY & NEUROLOGY

## 2024-04-11 RX ORDER — PREDNISONE 10 MG/1
TABLET ORAL
Qty: 40 TABLET | Refills: 0 | Status: SHIPPED | OUTPATIENT
Start: 2024-04-11 | End: 2024-04-15
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
## 2024-04-11 NOTE — PATIENT INSTRUCTIONS
5 day prednisone taper prescribed: 100 mg (10 tabs), 90 mg (9 tabs), 80 mg (8 tabs), 70 mg (7 tabs), 60 mg (6 tabs). Split up doses with meals. Day 1: Take 4 tabs with breakfast, 3 tabs with lunch, 3 tabs with dinner. Day 2: Take 3 tabs with each meal. Day 3: Take 3 tabs with breakfast, 3 tabs with lunch, 2 tabs with dinner. Day 4: Take 3 tabs with breakfast, 2 tabs with lunch and dinner. Day 5: Take 2 tabs with each meal  The patient was instructed to ice the occipital region for no more than 20 minutes at least once a day but may repeat this as many times as they would like.   Discussed ergonomic accommodations for occipital neuritis/neuralgia. Mainly perform all work at eye level to minimize continued neck flexion which will aggravate the nerve.  Patient was encouraged to do daily light cardio exercise but instructed to limit physical activities to walking, walking in water up to the waist only or riding a stationary bike, recumbent preferred. No weight lifting in upper body, no neck massage, no acupuncture of neck, and no dry needling of upper neck. No neck PT unless otherwise stated. If neck PT is recommended, the therapist may do joint manipulation at this time but no suboccipital soft tissue therapy. Any of your therapists may also do passive neck range of motion activities. No chiropractor work on neck. Lower body strengthening with resistant bands, leg machines, and strapping weights to legs okay. No core body workouts. No running or use of cardio equipment other than stationary bike. No swimming or body surfing. No amusement park rides. No lifting more than 5-10 lbs and bend at the knees, not the waist.  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 To be referred for consultation for occipital nerve release procedure if initially clinically responsive to  injections but always with a return of symptoms.  Continue vestibular PT for balance and eye movements   Continue ST for cognition

## 2024-04-12 ENCOUNTER — CLINICAL SUPPORT (OUTPATIENT)
Dept: REHABILITATION | Facility: HOSPITAL | Age: 64
End: 2024-04-12
Payer: COMMERCIAL

## 2024-04-12 DIAGNOSIS — H51.11 CONVERGENCE INSUFFICIENCY: ICD-10-CM

## 2024-04-12 DIAGNOSIS — H81.8X9 DEFICIT OF VESTIBULO-OCULAR REFLEX: ICD-10-CM

## 2024-04-12 DIAGNOSIS — S09.90XS BALANCE DISTURBANCE DUE TO OLD HEAD TRAUMA: Primary | ICD-10-CM

## 2024-04-12 DIAGNOSIS — R26.89 BALANCE DISTURBANCE DUE TO OLD HEAD TRAUMA: Primary | ICD-10-CM

## 2024-04-12 DIAGNOSIS — H55.81 SACCADIC EYE MOVEMENT DEFICIENCY: ICD-10-CM

## 2024-04-12 PROCEDURE — 97112 NEUROMUSCULAR REEDUCATION: CPT | Mod: PO

## 2024-04-12 PROCEDURE — 97530 THERAPEUTIC ACTIVITIES: CPT | Mod: PO

## 2024-04-12 NOTE — PROGRESS NOTES
" [facility]   Physical Therapy Treatment Note      Name: Eden SIGALALuana  Clinic Number: [id number]    Therapy Diagnosis:   Encounter Diagnoses   Name Primary?    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 Yes    Saccadic eye movement deficiency     Convergence insufficiency     Deficit of vestibulo-ocular reflex      Physician: Austin Christian MD    Visit Date: 4/12/2024     Physician Orders: PT Eval and Treat; Vestibular Therapy  Medical Diagnosis from Referral: Concussion with loss of consciousness, sequela [S06.0X9S]   Imbalance [R26.89]   Evaluation Date: 3/1/2024  Authorization Period Expiration: 12/31/2024  Plan of Care Expiration: 4/26/24  Visit # / Visits authorized: 4/20     Time In: 1203  Time Out: 1245  Total Billable Time: 42 minutes     Precautions: Standard    PTA Visit #: 0/5     Subjective     Patient reports: She is doing pretty good today. States that she saw her neurologist yesterday and things went well, was instructed to continue with PT.     She was not given HEP   Response to previous treatment: a lot of neck soreness the next day.  Functional change: on going    Pain: 5/10  Location: neck pain    Objective      Objective Measures updated on 4/5/24.        Treatment     Eden received the treatments listed below:      therapeutic exercises to develop strength, endurance, and posture for 00 minutes including:    NP      neuromuscular re-education activities to improve: Balance, Coordination, and Proprioception for 26 minutes. The following activities were included:    Oculomotor exercises:  X 30 sec VOR x 1 horizontal, seated, "X" target, 90 bpm  2 x 30 sec VOR x 1 horizontal, seated, "X" target, 100 bpm  3 x 30 sec VOR x 1 vertical, seated, "X" target, 100 bpm    2 x 30 sec saccades, horizontal, seated, "X" targets, 100 bpm  2 x 30 sec saccades, vertical, seated, "X" targets, 100 bpm     Balance exercises:  Parallel bars:  Large bosu, round sided down:  3 x " "30" static standing, eyes open, no UE support  2 x 30" B, modified SLS with 1 leg on ground + eyes closed       therapeutic activities to improve functional performance for 16 minutes, including:     Functional motion tolerance:  Hallway ambulation:  X 100 feet forward walking with left/right head nods  X 100 feet forward walking with left/right head nods  X 100 feet forward walking with up/down head nods  X 100 feet backward walking with up/down head nods    Basketball goal:  2 x 10 B, clip transfer from crate to goal via trunk bending and quarter turns      Patient Education and Home Exercises       Education provided:   - benefits of todays interventions, HEP     Written Home Exercises Provided: yes. Exercises were reviewed and Eden was able to demonstrate them prior to the end of the session.  Eden demonstrated good  understanding of the education provided. See Electronic Medical Record under Patient Instructions for exercises provided during therapy sessions    Assessment     Eden Kuhn tolerated today's session well. She responded well to progression of oculomotor, motion tolerance, and balance interventions on today with at most moderate dizziness evoked and relatively quick resolution. She was mostly challenged by vertical VOR and backwards walking with left/right head turns but showed improved performance with repetition. She continues to be appropriate for skilled physical therapy services to improve her motion and overall activity tolerance.     Eden Is progressing well towards her goals.   Patient prognosis is Good.     Patient will continue to benefit from skilled outpatient physical therapy to address the deficits listed in the problem list box on initial evaluation, provide pt/family education and to maximize pt's level of independence in the home and community environment.     Patient's spiritual, cultural and educational needs considered and pt agreeable to plan of care and " goals.     Anticipated barriers to physical therapy: none    Goals:   Short Term Goals = Long Term Goals: 8 weeks   Pt will be independent with an individualized home exercise program. Ongoing  Pt will perform saccades at >/= 110 bpm for 30 seconds without corrective movements noted to show improved environmental scanning ability. Progressing  Pt will improve convergence point to </= 10 cm without symptom provocation. Met 4/5/24  Pt will perform horizontal VOR x1 at >/= 120 bpm for 30 seconds without symptom provocation to show improved gaze stabilization. Ongoing  Pt will perform VOR cancellation with dizziness lasting </= 5 seconds to show improved visual motion tolerance. Met 4/5/24  Pt will improve Functional Gait Assessment (FGA) score to at least 23/30 for increased independence with home and community ambulation. Met 4/5/24  PT will administer Modified Motion Sensitivity Quotient to further assess pt's reports of visual motion sensitivity. Met 4/5/24 (no new goal necessary due to score)    Plan     Cont to progress vestibular and balance activity    Erin Wing, PT

## 2024-04-15 ENCOUNTER — CLINICAL SUPPORT (OUTPATIENT)
Dept: REHABILITATION | Facility: HOSPITAL | Age: 64
End: 2024-04-15
Payer: COMMERCIAL

## 2024-04-15 DIAGNOSIS — H51.11 CONVERGENCE INSUFFICIENCY: ICD-10-CM

## 2024-04-15 DIAGNOSIS — R26.89 BALANCE DISTURBANCE DUE TO OLD HEAD TRAUMA: Primary | ICD-10-CM

## 2024-04-15 DIAGNOSIS — S09.90XS BALANCE DISTURBANCE DUE TO OLD HEAD TRAUMA: Primary | ICD-10-CM

## 2024-04-15 DIAGNOSIS — H81.8X9 DEFICIT OF VESTIBULO-OCULAR REFLEX: ICD-10-CM

## 2024-04-15 DIAGNOSIS — H55.81 SACCADIC EYE MOVEMENT DEFICIENCY: ICD-10-CM

## 2024-04-15 PROCEDURE — 97530 THERAPEUTIC ACTIVITIES: CPT | Mod: PO

## 2024-04-15 PROCEDURE — 97112 NEUROMUSCULAR REEDUCATION: CPT | Mod: PO

## 2024-04-15 NOTE — PROGRESS NOTES
" [facility]   Physical Therapy Treatment Note      Name: Eden Kuhn  Clinic Number: [id number]    Therapy Diagnosis:   Encounter Diagnoses   Name Primary?    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 Yes    Saccadic eye movement deficiency     Convergence insufficiency     Deficit of vestibulo-ocular reflex        Physician: Austin Christian MD    Visit Date: 4/15/2024     Physician Orders: PT Eval and Treat; Vestibular Therapy  Medical Diagnosis from Referral: Concussion with loss of consciousness, sequela [S06.0X9S]   Imbalance [R26.89]   Evaluation Date: 3/1/2024  Authorization Period Expiration: 12/31/2024  Plan of Care Expiration: 4/26/24  Visit # / Visits authorized: 5/20     Time In: 1348  Time Out: 1427  Total Billable Time: 39 minutes     Precautions: Standard    PTA Visit #: 0/5     Subjective     Patient reports: that the BOSU interventions wiped her legs out for 2 days after last appointment.    She was not given HEP   Response to previous treatment: a lot of leg soreness (glutes to calf)  Functional change: on going    Pain: 5/10  Location: neck pain    Objective      Objective Measures updated on 4/5/24.        Treatment     Eden received the treatments listed below:      therapeutic exercises to develop strength, endurance, and posture for 3 minutes including:    3 x 45s, Calf stretch at incline    neuromuscular re-education activities to improve: Balance, Coordination, and Proprioception for 26 minutes. The following activities were included:    Oculomotor exercises:  3 x 30 sec VOR x 1 horizontal, seated, "X" target, 100 bpm (mild difficulty noted maintaining target)  3 x 30 sec VOR x 1 vertical, seated, "X" target, 100 bpm    2 x 30 sec saccades, horizontal, seated, "X" targets, 100 bpm  2 x 30 sec saccades, vertical, seated, "X" targets, 100 bpm     Balance exercises:     Airex: Feet together  3 x 30s, Eyes closed  3 x 30s, Eyes closed with horizontal head " turns  3 x 30s, Eyes closed with vertical head turns    therapeutic activities to improve functional performance for 10 minutes, including:     Functional motion tolerance:  Hallway ambulation:  X 100 feet forward walking with left/right head nods  X 100 feet backward walking with left/right head nods  X 100 feet forward walking with up/down head nods  X 100 feet backward walking with up/down head nods  2 x 100 feet, Fwd ambulation with eyes closed      Patient Education and Home Exercises       Education provided:   - benefits of todays interventions, HEP     Written Home Exercises Provided: yes. Exercises were reviewed and Eden was able to demonstrate them prior to the end of the session.  Eden demonstrated good  understanding of the education provided. See Electronic Medical Record under Patient Instructions for exercises provided during therapy sessions    Assessment     Eden performed well today's session. She continues to perform oculomotor interventions with good quality; these interventions were not progress this date. She reports that her main challenge at this time is vision eliminated balance interventions. She performed vision-eliminated ambulation very well; no VCs were required for path maintenance today. BOSU interventions were not included today due to reports of significant discomfort for 2 days following. PT did include calf stretches at incline to address her reports of tightness. She continues to be appropriate for skilled physical therapy services to improve her motion and overall activity tolerance.     Eden Is progressing well towards her goals.   Patient prognosis is Good.     Patient will continue to benefit from skilled outpatient physical therapy to address the deficits listed in the problem list box on initial evaluation, provide pt/family education and to maximize pt's level of independence in the home and community environment.     Patient's spiritual, cultural and  educational needs considered and pt agreeable to plan of care and goals.     Anticipated barriers to physical therapy: none    Goals:   Short Term Goals = Long Term Goals: 8 weeks   Pt will be independent with an individualized home exercise program. Ongoing  Pt will perform saccades at >/= 110 bpm for 30 seconds without corrective movements noted to show improved environmental scanning ability. Progressing  Pt will improve convergence point to </= 10 cm without symptom provocation. Met 4/5/24  Pt will perform horizontal VOR x1 at >/= 120 bpm for 30 seconds without symptom provocation to show improved gaze stabilization. Ongoing  Pt will perform VOR cancellation with dizziness lasting </= 5 seconds to show improved visual motion tolerance. Met 4/5/24  Pt will improve Functional Gait Assessment (FGA) score to at least 23/30 for increased independence with home and community ambulation. Met 4/5/24  PT will administer Modified Motion Sensitivity Quotient to further assess pt's reports of visual motion sensitivity. Met 4/5/24 (no new goal necessary due to score)    Plan     Cont to progress vestibular and balance activity    Nicki Mcgraw PT

## 2024-04-17 ENCOUNTER — CLINICAL SUPPORT (OUTPATIENT)
Dept: REHABILITATION | Facility: HOSPITAL | Age: 64
End: 2024-04-17
Payer: COMMERCIAL

## 2024-04-17 DIAGNOSIS — H55.81 SACCADIC EYE MOVEMENT DEFICIENCY: ICD-10-CM

## 2024-04-17 DIAGNOSIS — H81.8X9 DEFICIT OF VESTIBULO-OCULAR REFLEX: ICD-10-CM

## 2024-04-17 DIAGNOSIS — R26.89 BALANCE DISTURBANCE DUE TO OLD HEAD TRAUMA: Primary | ICD-10-CM

## 2024-04-17 DIAGNOSIS — H51.11 CONVERGENCE INSUFFICIENCY: ICD-10-CM

## 2024-04-17 DIAGNOSIS — S09.90XS BALANCE DISTURBANCE DUE TO OLD HEAD TRAUMA: Primary | ICD-10-CM

## 2024-04-17 PROCEDURE — 97530 THERAPEUTIC ACTIVITIES: CPT | Mod: PO

## 2024-04-17 PROCEDURE — 97112 NEUROMUSCULAR REEDUCATION: CPT | Mod: PO

## 2024-04-17 NOTE — PROGRESS NOTES
"  [facility]   Physical Therapy Treatment Note      Name: Eden SIGALALuana  Clinic Number: [id number]    Therapy Diagnosis:   Encounter Diagnoses   Name Primary?    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 Yes    Saccadic eye movement deficiency     Convergence insufficiency     Deficit of vestibulo-ocular reflex      Physician: Austin Christian MD    Visit Date: 4/17/2024     Physician Orders: PT Eval and Treat; Vestibular Therapy  Medical Diagnosis from Referral: Concussion with loss of consciousness, sequela [S06.0X9S]   Imbalance [R26.89]   Evaluation Date: 3/1/2024  Authorization Period Expiration: 12/31/2024  Plan of Care Expiration: 4/26/24  Visit # / Visits authorized: 5/20     Time In: 1348  Time Out: 1428  Total Billable Time: 40 minutes     Precautions: Standard    PTA Visit #: 0/5     Subjective     Patient reports: She is feeling pretty good at the start of her session. States that the bosu     She was not given HEP   Response to previous treatment: tolerated well  Functional change: on going    Pain: 5/10  Location: neck pain    Objective      Objective Measures updated on 4/5/24.        Treatment     Eden received the treatments listed below:      therapeutic exercises to develop strength, endurance, and posture for 2 minutes including:    2 x 45", Calf stretch on incline      neuromuscular re-education activities to improve: Balance, Coordination, and Proprioception for 26 minutes. The following activities were included:    Oculomotor exercises:  3 x 30 sec VOR x 1 horizontal, seated, "X" target, 110 bpm - mild dizziness noted with fatigue  3 x 30 sec VOR x 1 vertical, seated, "X" target, 110 bpm     2 x 30 sec saccades, horizontal, seated, "X" targets, 110 bpm  2 x 30 sec saccades, vertical, seated, "X" targets, 110 bpm     Balance exercises:  Foam fitter: Feet together  3 x 30s, Eyes closed  2 x 30s, Eyes closed with horizontal head turns  2 x 30s, Eyes closed " with vertical head turns      therapeutic activities to improve functional performance for 12 minutes, including:     Functional motion tolerance:  Hallway ambulation:  X 100 feet forward walking with left/right head nods  X 100 feet backward walking with left/right head nods  X 100 feet forward walking with up/down head nods  X 100 feet backward walking with up/down head nods  2 x 100 feet, Fwd ambulation with eyes closed      Patient Education and Home Exercises       Education provided:   - benefits of todays interventions, HEP     Written Home Exercises Provided: yes. Exercises were reviewed and Eden was able to demonstrate them prior to the end of the session.  Eden demonstrated good  understanding of the education provided. See Electronic Medical Record under Patient Instructions for exercises provided during therapy sessions    Assessment     Eden performed well today's session. Tolerated increases in speed with oculomotor activities well and showed good postural control and no dizziness with functional motion tolerance activities. She is progressing well and feels comfortable about discharging at the end of next week per her POC. PT provided instructions for re-establishing care if needed in the future for similar issues. She continues to be appropriate for skilled physical therapy services to improve her motion and overall activity tolerance.     Eden Is progressing well towards her goals.   Patient prognosis is Good.     Patient will continue to benefit from skilled outpatient physical therapy to address the deficits listed in the problem list box on initial evaluation, provide pt/family education and to maximize pt's level of independence in the home and community environment.     Patient's spiritual, cultural and educational needs considered and pt agreeable to plan of care and goals.     Anticipated barriers to physical therapy: none    Goals:   Short Term Goals = Long Term Goals: 8  weeks   Pt will be independent with an individualized home exercise program. Ongoing  Pt will perform saccades at >/= 110 bpm for 30 seconds without corrective movements noted to show improved environmental scanning ability. Progressing  Pt will improve convergence point to </= 10 cm without symptom provocation. Met 4/5/24  Pt will perform horizontal VOR x1 at >/= 120 bpm for 30 seconds without symptom provocation to show improved gaze stabilization. Ongoing  Pt will perform VOR cancellation with dizziness lasting </= 5 seconds to show improved visual motion tolerance. Met 4/5/24  Pt will improve Functional Gait Assessment (FGA) score to at least 23/30 for increased independence with home and community ambulation. Met 4/5/24  PT will administer Modified Motion Sensitivity Quotient to further assess pt's reports of visual motion sensitivity. Met 4/5/24 (no new goal necessary due to score)    Plan     Cont to progress vestibular and balance activity    Erin Wing, PT

## 2024-04-19 NOTE — PROGRESS NOTES
"  [facility]   Physical Therapy Discharge Summary     Name: Eden SIGALALuana  Clinic Number: [id number]    Therapy Diagnosis:   Encounter Diagnoses   Name Primary?    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 Yes    Saccadic eye movement deficiency     Convergence insufficiency     Deficit of vestibulo-ocular reflex      Physician: Austin Christian MD    Visit Date: 4/22/2024     Physician Orders: PT Eval and Treat; Vestibular Therapy  Medical Diagnosis from Referral: Concussion with loss of consciousness, sequela [S06.0X9S]   Imbalance [R26.89]   Evaluation Date: 3/1/2024  Authorization Period Expiration: 12/31/2024  Plan of Care Expiration: 4/26/24  Visit # / Visits authorized: 7/20     Time In: 1537  Time Out: 1557  Total Billable Time: 20 minutes     Precautions: Standard    PTA Visit #: 0/5     Subjective     Patient reports: "We're not going to do any of the balance stuff today. They make my legs cramp up and get really tight and I don't want to waste $1200 on my tickets to [employer] [place]. Today we need to focus on some elongated stretching."    She was not given HEP   Response to previous treatment: tolerated well  Functional change: on going    Pain: 5/10  Location: neck pain    Objective      Objective Measures updated on 4/5/24.      Visual/Oculomotor Screen: denies changes   Saccades: Intact: able to perform for 30" at 110 bpm     VOR 1: Intact: able to perform for 30" at 120 bpm      Treatment     Eden received the treatments listed below:      therapeutic exercises to develop strength, endurance, and posture for 00 minutes including:    NP      neuromuscular re-education activities to improve: Balance, Coordination, and Proprioception for 20 minutes. The following activities were included:    Objective testing listed above      therapeutic activities to improve functional performance for 00 minutes, including:     NP      Patient Education and Home Exercises       Education " provided:   - benefits of todays interventions, HEP     Written Home Exercises Provided: yes. Exercises were reviewed and Eden was able to demonstrate them prior to the end of the session.  Eden demonstrated good  understanding of the education provided. See Electronic Medical Record under Patient Instructions for exercises provided during therapy sessions    Assessment     Eden Kuhn tolerated today's session and was able to participate in a final reassessment prior to discharge from PT. Since starting therapy, she has reported improved tolerance to her daily activities, balance, dizziness and headaches. On objective testing performed today, she showed significant improvements in her saccades and VOR, able to perform at speeds that are within normal limits. To date, she has met 7/7 short- and long-term goals established at her initial evaluation and prior progress notes. Given her subjective and objective improvements in their functional mobility and their progress towards goals over their episode of therapy spanning 3/1/2024 - 4/22/24, both she and PT believe they are appropriate for discharge from outpatient PT at this time. She has been instructed to contact their PCP/Neurologist if there is a noticeable decline in their current level of mobility and independence or if they notice any symptoms indicating a worsening of their condition to seek the necessary medical attention and obtain new orders for outpatient physical therapy.     Eden Is progressing well towards her goals.   Patient prognosis is Good.     Patient will continue to benefit from skilled outpatient physical therapy to address the deficits listed in the problem list box on initial evaluation, provide pt/family education and to maximize pt's level of independence in the home and community environment.     Patient's spiritual, cultural and educational needs considered and pt agreeable to plan of care and goals.     Anticipated  barriers to physical therapy: none    Goals:   Short Term Goals = Long Term Goals: 8 weeks   Pt will be independent with an individualized home exercise program. Met 4/22/24  Pt will perform saccades at >/= 110 bpm for 30 seconds without corrective movements noted to show improved environmental scanning ability. Met 4/22/24  Pt will improve convergence point to </= 10 cm without symptom provocation. Met 4/5/24  Pt will perform horizontal VOR x1 at >/= 120 bpm for 30 seconds without symptom provocation to show improved gaze stabilization. Met 4/22/24  Pt will perform VOR cancellation with dizziness lasting </= 5 seconds to show improved visual motion tolerance. Met 4/5/24  Pt will improve Functional Gait Assessment (FGA) score to at least 23/30 for increased independence with home and community ambulation. Met 4/5/24  PT will administer Modified Motion Sensitivity Quotient to further assess pt's reports of visual motion sensitivity. Met 4/5/24 (no new goal necessary due to score)    Plan     Patient is discharged from outpatient Neuro PT    Erin Wing, PT

## 2024-04-20 ENCOUNTER — PATIENT MESSAGE (OUTPATIENT)
Dept: NEUROLOGY | Facility: CLINIC | Age: 64
End: 2024-04-20
Payer: COMMERCIAL

## 2024-04-22 ENCOUNTER — CLINICAL SUPPORT (OUTPATIENT)
Dept: REHABILITATION | Facility: HOSPITAL | Age: 64
End: 2024-04-22
Payer: COMMERCIAL

## 2024-04-22 DIAGNOSIS — H51.11 CONVERGENCE INSUFFICIENCY: ICD-10-CM

## 2024-04-22 DIAGNOSIS — H81.8X9 DEFICIT OF VESTIBULO-OCULAR REFLEX: ICD-10-CM

## 2024-04-22 DIAGNOSIS — R26.89 BALANCE DISTURBANCE DUE TO OLD HEAD TRAUMA: Primary | ICD-10-CM

## 2024-04-22 DIAGNOSIS — H55.81 SACCADIC EYE MOVEMENT DEFICIENCY: ICD-10-CM

## 2024-04-22 DIAGNOSIS — S09.90XS BALANCE DISTURBANCE DUE TO OLD HEAD TRAUMA: Primary | ICD-10-CM

## 2024-04-22 PROCEDURE — 97112 NEUROMUSCULAR REEDUCATION: CPT | Mod: PO

## 2024-04-22 RX ORDER — PREDNISONE 10 MG/1
TABLET ORAL
Qty: 40 TABLET | Refills: 0 | Status: SHIPPED | OUTPATIENT
Start: 2024-04-22 | End: 2024-04-26

## 2024-05-06 NOTE — PLAN OF CARE
[facility] - Telemetry Stepdown      HOME HEALTH ORDERS  FACE TO FACE ENCOUNTER    Patient Name: Eden Kuhn  YOB: 1960    PCP: Chino Figueroa MD   PCP Address: [address] / [place]  PCP Phone Number: [phone]  PCP Fax: [phone]    Encounter Date: 1/9/22    Admit to Home Health    Diagnoses:  Active Hospital Problems    Diagnosis  POA    *Acute metabolic encephalopathy [G93.41]  Unknown    EtOH dependence [F10.20]  Unknown    Hypothyroidism [E03.9]  Unknown    Hyponatremia [E87.1]  Unknown    Upper abdominal pain [R10.10]  Yes      Resolved Hospital Problems   No resolved problems to display.       Follow Up Appointments:  No future appointments.    Allergies:  Review of patient's allergies indicates:   Allergen Reactions    Ciprocinonide Anaphylaxis       Medications: Review discharge medications with patient and family and provide education.    Current Facility-Administered Medications   Medication Dose Route Frequency Provider Last Rate Last Admin    0.9%  NaCl infusion   Intravenous Continuous Rodney oHrn MD        dextrose 50% injection 12.5 g  12.5 g Intravenous PRN Kevin Jackson MD        dextrose 50% injection 25 g  25 g Intravenous PRN Kevin Jackson MD        folic acid tablet 1 mg  1 mg Oral Daily Kevin Jackson MD   1 mg at 01/10/22 0825    glucagon (human recombinant) injection 1 mg  1 mg Intramuscular PRN Kevin Jackson MD        glucose chewable tablet 16 g  16 g Oral PRN Kevin Jackson MD        glucose chewable tablet 24 g  24 g Oral PRN Kevin Jackson MD        hydrALAZINE tablet 25 mg  25 mg Oral Q6H PRN Kevin Jackson MD   25 mg at 01/11/22 0424    HYDROcodone-acetaminophen  mg per tablet 1 tablet  1 tablet Oral Q6H PRN Rodney Horn MD   1 tablet at 01/11/22 0741    hydrOXYzine HCL tablet 25 mg  25 mg Oral TID PRN Aristeo Quinonez MD        levothyroxine tablet 137 mcg  137 mcg Oral Before breakfast Kevin Jackson MD   137 mcg at  01/11/22 0600    LIDOcaine 5 % patch 1 patch  1 patch Transdermal Q24H Aristeo Quinonez MD   1 patch at 01/10/22 1111    LORazepam tablet 2 mg  2 mg Oral Q4H PRN Kevin Jackson MD   2 mg at 01/11/22 0817    methocarbamoL tablet 500 mg  500 mg Oral QID Aristeo Quinonez MD   500 mg at 01/11/22 0817    morphine injection 3 mg  3 mg Intravenous Q4H PRN Aristeo Quinonez MD   3 mg at 01/11/22 0613    multivitamin tablet  1 tablet Oral Daily Kevin Jackson MD   1 tablet at 01/10/22 0825    naloxone 0.4 mg/mL injection 0.02 mg  0.02 mg Intravenous PRN Kevin Jackson MD        pravastatin tablet 40 mg  40 mg Oral Daily Kevin aJckson MD   40 mg at 01/10/22 0827    trazodone split tablet 25 mg  25 mg Oral Nightly PRN Kevin Jackson MD   25 mg at 01/11/22 0135     Current Discharge Medication List      START taking these medications    Details   HYDROcodone-acetaminophen (NORCO) 5-325 mg per tablet Take 1 tablet by mouth every 8 (eight) hours as needed for Pain.  Qty: 15 tablet, Refills: 0    Comments: Quantity prescribed more than 7 day supply? No      ketorolac (TORADOL) 10 mg tablet Take 1 tablet (10 mg total) by mouth every 6 (six) hours. for 5 days  Qty: 20 tablet, Refills: 0      LIDOcaine (LIDODERM) 5 % Place 1 patch onto the skin once daily. Remove & Discard patch within 12 hours or as directed by MD for 7 days  Qty: 7 patch, Refills: 0      methocarbamoL (ROBAXIN) 500 MG Tab Take 1 tablet (500 mg total) by mouth 4 (four) times daily. for 10 days  Qty: 40 tablet, Refills: 0         CONTINUE these medications which have NOT CHANGED    Details   albuterol (PROVENTIL/VENTOLIN HFA) 90 mcg/actuation inhaler Inhale 2 puffs into the lungs every 6 (six) hours as needed for Wheezing or Shortness of Breath.  Refills: 6      carisoprodol (SOMA) 350 MG tablet Take 350 mg by mouth 3 (three) times daily.  Refills: 0      diphenoxylate-atropine 2.5-0.025 mg (LOMOTIL) 2.5-0.025 mg per tablet Take 1 tablet by mouth every 6 (six) hours as needed  for Diarrhea.  Refills: 0      ergocalciferol (ERGOCALCIFEROL) 50,000 unit Cap Take 50,000 Units by mouth twice a week.  Refills: 5      hydrocodone-acetaminophen 10-325mg (NORCO)  mg Tab Take 1 tablet by mouth every 8 (eight) hours as needed for Pain.  Refills: 0      pravastatin (PRAVACHOL) 80 MG tablet Take 80 mg by mouth once daily.      traZODone (DESYREL) 100 MG tablet Take 100 mg by mouth nightly as needed for Insomnia.      valACYclovir (VALTREX) 500 MG tablet Take 500 mg by mouth once daily.  Refills: 9      b complex vitamins capsule Take 1 capsule by mouth once daily.      levothyroxine (SYNTHROID) 125 MCG tablet Take 125 mcg by mouth once daily.  Refills: 1      liothyronine (CYTOMEL) 5 MCG Tab TK 1 T PO QAM  Refills: 1               I have seen and examined this patient within the last 30 days. My clinical findings that support the need for the home health skilled services and home bound status are the following:no   Weakness/numbness causing balance and gait disturbance due to joint trauma resulting in spinal edema seen on MRI making it taxing to leave home.     Diet:   regular diet    Labs:  none    Referrals/ Consults  Physical Therapy to evaluate and treat. Evaluate for home safety and equipment needs; Establish/upgrade home exercise program. Perform / instruct on therapeutic exercises, gait training, transfer training, and Range of Motion.    Activities:   activity as tolerated    Nursing:   Agency to admit patient within 24 hours of hospital discharge unless specified on physician order or at patient request    SN to complete comprehensive assessment including routine vital signs. Instruct on disease process and s/s of complications to report to MD. Review/verify medication list sent home with the patient at time of discharge  and instruct patient/caregiver as needed. Frequency may be adjusted depending on start of care date.     Skilled nurse to perform up to 3 visits PRN for symptoms  none related to diagnosis    Notify MD if SBP > 160 or < 90; DBP > 90 or < 50; HR > 120 or < 50; Temp > 101; O2 < 88%; Other:   n/a    Ok to schedule additional visits based on staff availability and patient request on consecutive days within the home health episode.    When multiple disciplines ordered:    Start of Care occurs on Sunday - Wednesday schedule remaining discipline evaluations as ordered on separate consecutive days following the start of care.    Thursday SOC -schedule subsequent evaluations Friday and Monday the following week.     Friday - Saturday SOC - schedule subsequent discipline evaluations on consecutive days starting Monday of the following week.    For all post-discharge communication and subsequent orders please contact patient's primary care physician. If unable to reach primary care physician or do not receive response within 30 minutes, [facility] order clarification    Miscellaneous   N/A    Home Health Aide:  Physical Therapy Three times weekly    Wound Care Orders  n/a    I certify that this patient is confined to her home and needs physical therapy.

## 2024-05-23 ENCOUNTER — OFFICE VISIT (OUTPATIENT)
Dept: NEUROLOGY | Facility: CLINIC | Age: 64
End: 2024-05-23
Payer: COMMERCIAL

## 2024-05-23 VITALS — SYSTOLIC BLOOD PRESSURE: 167 MMHG | DIASTOLIC BLOOD PRESSURE: 102 MMHG | HEART RATE: 88 BPM

## 2024-05-23 DIAGNOSIS — S06.0X9S CONCUSSION WITH LOSS OF CONSCIOUSNESS, SEQUELA: Primary | ICD-10-CM

## 2024-05-23 DIAGNOSIS — S13.4XXD WHIPLASH INJURY TO NECK, SUBSEQUENT ENCOUNTER: ICD-10-CM

## 2024-05-23 PROCEDURE — 1159F MED LIST DOCD IN RCRD: CPT | Mod: CPTII,S$GLB,, | Performed by: PSYCHIATRY & NEUROLOGY

## 2024-05-23 PROCEDURE — 99213 OFFICE O/P EST LOW 20 MIN: CPT | Mod: S$GLB,,, | Performed by: PSYCHIATRY & NEUROLOGY

## 2024-05-23 PROCEDURE — 99999 PR PBB SHADOW E&M-EST. PATIENT-LVL IV: CPT | Mod: PBBFAC,,, | Performed by: PSYCHIATRY & NEUROLOGY

## 2024-05-23 PROCEDURE — 3080F DIAST BP >= 90 MM HG: CPT | Mod: CPTII,S$GLB,, | Performed by: PSYCHIATRY & NEUROLOGY

## 2024-05-23 PROCEDURE — 3077F SYST BP >= 140 MM HG: CPT | Mod: CPTII,S$GLB,, | Performed by: PSYCHIATRY & NEUROLOGY

## 2024-05-23 PROCEDURE — 1160F RVW MEDS BY RX/DR IN RCRD: CPT | Mod: CPTII,S$GLB,, | Performed by: PSYCHIATRY & NEUROLOGY

## 2024-05-23 NOTE — PATIENT INSTRUCTIONS
May progressively increase physical activity as tolerated  Ensure adequate hydration and if blood pressure is usually normal, would add a little salt to diet  Make sure to not eat too much sugar as can cause lightheadedness so may want to add protein if eating sugar  Start daily yoga, piliates, or emeterio chi 10-15 minutes daily  Message me if you would want to do sleep behavioral therapy
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
## 2024-06-20 ENCOUNTER — OFFICE VISIT (OUTPATIENT)
Dept: URGENT CARE | Facility: CLINIC | Age: 64
End: 2024-06-20
Payer: COMMERCIAL

## 2024-06-20 VITALS
OXYGEN SATURATION: 97 % | HEART RATE: 87 BPM | TEMPERATURE: 98 F | SYSTOLIC BLOOD PRESSURE: 145 MMHG | HEIGHT: 65 IN | BODY MASS INDEX: 19.49 KG/M2 | DIASTOLIC BLOOD PRESSURE: 99 MMHG | WEIGHT: 117 LBS | RESPIRATION RATE: 15 BRPM

## 2024-06-20 DIAGNOSIS — R07.81 RIB PAIN ON LEFT SIDE: Primary | ICD-10-CM

## 2024-06-20 DIAGNOSIS — J90 PLEURAL EFFUSION: ICD-10-CM

## 2024-06-20 PROCEDURE — 99213 OFFICE O/P EST LOW 20 MIN: CPT | Mod: S$GLB,,, | Performed by: NURSE PRACTITIONER

## 2024-06-20 PROCEDURE — 71101 X-RAY EXAM UNILAT RIBS/CHEST: CPT | Mod: LT,S$GLB,, | Performed by: RADIOLOGY

## 2024-06-20 RX ORDER — FUROSEMIDE 20 MG/1
20 TABLET ORAL DAILY
Qty: 10 TABLET | Refills: 0 | Status: SHIPPED | OUTPATIENT
Start: 2024-06-20 | End: 2024-06-30

## 2024-06-20 NOTE — PROGRESS NOTES
"Subjective:      Patient ID: Eden Kuhn is a 63 y.o. female.    Vitals:  height is 5' 5" (1.651 m) and weight is 53.1 kg (117 lb). Her oral temperature is 97.8 °F (36.6 °C). Her blood pressure is 145/99 (abnormal) and her pulse is 87. Her respiration is 15 and oxygen saturation is 97%.     Chief Complaint: Fall    Patient presents c.o. fall.Symps include left flank pain and an aching pain.Patient fell 1 week ago.    Fall  The accident occurred 5 to 7 days ago. The fall occurred while walking. She fell from a height of 1 to 2 ft. She landed on [place]. There was no blood loss. The pain is at a severity of 8/10. The pain is moderate. The symptoms are aggravated by ambulation. Pertinent negatives include no abdominal pain, bowel incontinence, fever, headaches, hearing loss, hematuria, loss of consciousness, numbness, tingling, visual change or vomiting. Treatments tried: norco. The treatment provided no relief.       Constitution: Negative for fever.   Respiratory: Negative.     Gastrointestinal:  Negative for abdominal pain, vomiting and bowel incontinence.   Genitourinary:  Negative for hematuria.   Skin:  Negative for erythema.   Neurological:  Negative for headaches, loss of consciousness and numbness.      Objective:     Physical Exam   Constitutional: No distress.   HENT:   Head: Normocephalic.   Pulmonary/Chest: Effort normal and breath sounds normal.   Abdominal: Normal appearance.   Neurological: She is alert.   Skin: Skin is warm and dry. No bruising and No erythema       Assessment:     1. Rib pain on left side    2. Pleural effusion        Plan:       Rib pain on left side  -     XR Ribs Min 3 Views w/PA Chest Left; Future; Expected date: 06/20/2024    Pleural effusion  -     furosemide (LASIX) 20 MG tablet; Take 1 tablet (20 mg total) by mouth once daily. for 10 days  Dispense: 10 tablet; Refill: 0    XR RIB/Chest-No acute displaced left rib fractures detected.     Small left pleural effusion and " or pleural thickening.  No evidence for pneumothorax.    Patient Instructions   Support your chest with a pillow when you cough. This will help lessen the pain.  Lie on the side that hurts. This may make breathing more comfortable.  Strengthen your lungs with exercises such as deep breathing and coughing.  Stop smoking. Smoking increases your chance of lung infection.  Get plenty of rest.  If symptoms persist f/u with PCP with in 1 wk        You must understand that you've received an Urgent Care treatment only and that you may be released before all your medical problems are known or treated. You, the patient, will arrange for follow up care as instructed.  If your condition worsens we recommend that you receive another evaluation at the emergency room immediately or contact your primary medical clinics after hours call service to discuss your concerns.  Please return here or go to the Emergency Department for any concerns or worsening of condition.

## 2024-06-20 NOTE — PATIENT INSTRUCTIONS
Support your chest with a pillow when you cough. This will help lessen the pain.  Lie on the side that hurts. This may make breathing more comfortable.  Strengthen your lungs with exercises such as deep breathing and coughing.  Stop smoking. Smoking increases your chance of lung infection.  Get plenty of rest.  If symptoms persist f/u with PCP with in 1 wk        You must understand that you've received an Urgent Care treatment only and that you may be released before all your medical problems are known or treated. You, the patient, will arrange for follow up care as instructed.  If your condition worsens we recommend that you receive another evaluation at the emergency room immediately or contact your primary medical clinics after hours call service to discuss your concerns.  Please return here or go to the Emergency Department for any concerns or worsening of condition.

## 2024-06-27 ENCOUNTER — OFFICE VISIT (OUTPATIENT)
Dept: PULMONOLOGY | Facility: CLINIC | Age: 64
End: 2024-06-27
Payer: COMMERCIAL

## 2024-06-27 ENCOUNTER — LAB VISIT (OUTPATIENT)
Dept: LAB | Facility: HOSPITAL | Age: 64
End: 2024-06-27
Attending: INTERNAL MEDICINE
Payer: COMMERCIAL

## 2024-06-27 VITALS
WEIGHT: 114 LBS | DIASTOLIC BLOOD PRESSURE: 75 MMHG | HEART RATE: 80 BPM | SYSTOLIC BLOOD PRESSURE: 116 MMHG | BODY MASS INDEX: 20.2 KG/M2 | HEIGHT: 63 IN | OXYGEN SATURATION: 96 %

## 2024-06-27 DIAGNOSIS — R07.9 LEFT-SIDED CHEST PAIN: ICD-10-CM

## 2024-06-27 DIAGNOSIS — R07.9 LEFT-SIDED CHEST PAIN: Primary | ICD-10-CM

## 2024-06-27 LAB
ANION GAP SERPL CALC-SCNC: 10 MMOL/L (ref 8–16)
BUN SERPL-MCNC: 6 MG/DL (ref 8–23)
CALCIUM SERPL-MCNC: 10 MG/DL (ref 8.7–10.5)
CHLORIDE SERPL-SCNC: 94 MMOL/L (ref 95–110)
CO2 SERPL-SCNC: 26 MMOL/L (ref 23–29)
CREAT SERPL-MCNC: 0.8 MG/DL (ref 0.5–1.4)
EST. GFR  (NO RACE VARIABLE): >60 ML/MIN/1.73 M^2
GLUCOSE SERPL-MCNC: 80 MG/DL (ref 70–110)
POTASSIUM SERPL-SCNC: 4.7 MMOL/L (ref 3.5–5.1)
SODIUM SERPL-SCNC: 130 MMOL/L (ref 136–145)

## 2024-06-27 PROCEDURE — 1159F MED LIST DOCD IN RCRD: CPT | Mod: CPTII,S$GLB,, | Performed by: INTERNAL MEDICINE

## 2024-06-27 PROCEDURE — 36415 COLL VENOUS BLD VENIPUNCTURE: CPT | Performed by: INTERNAL MEDICINE

## 2024-06-27 PROCEDURE — 99999 PR PBB SHADOW E&M-EST. PATIENT-LVL III: CPT | Mod: PBBFAC,,, | Performed by: INTERNAL MEDICINE

## 2024-06-27 PROCEDURE — 3078F DIAST BP <80 MM HG: CPT | Mod: CPTII,S$GLB,, | Performed by: INTERNAL MEDICINE

## 2024-06-27 PROCEDURE — 3008F BODY MASS INDEX DOCD: CPT | Mod: CPTII,S$GLB,, | Performed by: INTERNAL MEDICINE

## 2024-06-27 PROCEDURE — 99203 OFFICE O/P NEW LOW 30 MIN: CPT | Mod: S$GLB,,, | Performed by: INTERNAL MEDICINE

## 2024-06-27 PROCEDURE — 80048 BASIC METABOLIC PNL TOTAL CA: CPT | Performed by: INTERNAL MEDICINE

## 2024-06-27 PROCEDURE — 3074F SYST BP LT 130 MM HG: CPT | Mod: CPTII,S$GLB,, | Performed by: INTERNAL MEDICINE

## 2024-06-27 RX ORDER — LEVOFLOXACIN 750 MG/1
750 TABLET ORAL DAILY
Qty: 7 TABLET | Refills: 0 | Status: SHIPPED | OUTPATIENT
Start: 2024-06-27

## 2024-06-27 NOTE — PROGRESS NOTES
"Subjective:      Patient ID: Eden Kuhn is a 63 y.o. female.    Chief Complaint: No chief complaint on file.    63 year old female with L sided pain with fall in October. She recently went to urgent care for L sided pain. She was given Lasix for small left sided effusion.     Patient reports PNA like symptoms including discoloration of sputum.     Current 1 pack day smoker.     Denies fever or chills.       Review of Systems   Respiratory:  Positive for shortness of breath and dyspnea on extertion.      Objective:     Physical Exam   Constitutional: She is oriented to person, place, and time. She appears well-developed and well-nourished.   HENT:   Head: Normocephalic.   Nose: Nose normal.   Cardiovascular: Normal rate and normal heart sounds.   Pulmonary/Chest: Normal expansion and symmetric chest wall expansion. She has no wheezes. She has no rhonchi.   Musculoskeletal:      Cervical back: Normal range of motion and neck supple.   Neurological: She is alert and oriented to person, place, and time.   Skin: Skin is warm and dry.   Psychiatric: She has a normal mood and affect. Her behavior is normal.   Nursing note and vitals reviewed.    Personal Diagnostic Review  none pertinent      6/27/2024    12:50 PM 6/20/2024     3:14 PM 3/14/2024     2:10 PM 1/3/2024    12:37 PM 11/9/2023    11:53 AM 10/11/2023    10:32 AM 10/2/2023     4:32 AM   Pulmonary Function Tests   SpO2 96 % 97 % 98 %    97 %   Height 5' 3" (1.6 m) 5' 5" (1.651 m) 5' 5" (1.651 m) 5' 5" (1.651 m) 5' 5" (1.651 m) 5' 5" (1.651 m)    Weight 51.7 kg (114 lb) 53.1 kg (117 lb) 53.4 kg (117 lb 11.6 oz) 49.9 kg (110 lb 0.2 oz) 49.9 kg (110 lb 0.2 oz) 49.9 kg (110 lb 0.2 oz)    BMI (Calculated) 20.2 19.5 19.6 18.3 18.3 18.3         Assessment:     No diagnosis found.     Outpatient Encounter Medications as of 6/27/2024   Medication Sig Dispense Refill    albuterol (PROVENTIL/VENTOLIN HFA) 90 mcg/actuation inhaler Inhale 2 puffs into the lungs every 6 " (six) hours as needed for Wheezing or Shortness of Breath.  6    ALPRAZolam (XANAX) 0.5 MG tablet Take 0.5 mg by mouth every 6 (six) hours as needed.      b complex vitamins capsule Take 1 capsule by mouth once daily.      diphenoxylate-atropine 2.5-0.025 mg (LOMOTIL) 2.5-0.025 mg per tablet Take 1 tablet by mouth every 6 (six) hours as needed for Diarrhea.  0    ergocalciferol (ERGOCALCIFEROL) 50,000 unit Cap Take 50,000 Units by mouth twice a week.  5    furosemide (LASIX) 20 MG tablet Take 1 tablet (20 mg total) by mouth once daily. for 10 days 10 tablet 0    HYDROcodone-acetaminophen (NORCO)  mg per tablet Take 1 tablet by mouth every 8 (eight) hours as needed.      levothyroxine (SYNTHROID) 125 MCG tablet Take 125 mcg by mouth once daily.  1    liothyronine (CYTOMEL) 5 MCG Tab TK 1 T PO QAM  1    pravastatin (PRAVACHOL) 80 MG tablet Take 80 mg by mouth once daily.      tiZANidine (ZANAFLEX) 4 MG tablet Take 4 mg by mouth every 8 (eight) hours as needed.      traZODone (DESYREL) 100 MG tablet Take 100 mg by mouth nightly as needed for Insomnia.      valACYclovir (VALTREX) 500 MG tablet Take 500 mg by mouth once daily.  9     No facility-administered encounter medications on file as of 6/27/2024.     No orders of the defined types were placed in this encounter.      Plan:     1. Left-sided chest pain  Assessment & Plan:  Concern for fx not seen on CXR. She also has small effusion that is very less likely to be related to CHF. Will ask patient to stop Lasix.     Orders:  -     Basic Metabolic Panel; Future; Expected date: 06/27/2024    Other orders  -     levoFLOXacin (LEVAQUIN) 750 MG tablet; Take 1 tablet (750 mg total) by mouth once daily.  Dispense: 7 tablet; Refill: 0      Ankur Barraza MD

## 2024-06-27 NOTE — ASSESSMENT & PLAN NOTE
Concern for fx not seen on CXR. She also has small effusion that is very less likely to be related to CHF. Will ask patient to stop Lasix.

## 2024-06-28 ENCOUNTER — TELEPHONE (OUTPATIENT)
Dept: PULMONOLOGY | Facility: CLINIC | Age: 64
End: 2024-06-28
Payer: COMMERCIAL

## 2024-06-28 ENCOUNTER — HOSPITAL ENCOUNTER (OUTPATIENT)
Dept: RADIOLOGY | Facility: HOSPITAL | Age: 64
Discharge: HOME | End: 2024-06-28
Attending: INTERNAL MEDICINE
Payer: COMMERCIAL

## 2024-06-28 DIAGNOSIS — R07.9 LEFT-SIDED CHEST PAIN: ICD-10-CM

## 2024-06-28 PROCEDURE — 71250 CT THORAX DX C-: CPT | Mod: 26,,, | Performed by: RADIOLOGY

## 2024-06-28 PROCEDURE — 71250 CT THORAX DX C-: CPT | Mod: TC

## 2024-06-28 NOTE — TELEPHONE ENCOUNTER
----- Message from Sonia Hawkins sent at 6/28/2024  1:30 PM CDT -----  Type:  Patient Advice     Who Called:pt      Does the patient know what this is regarding?:Prescription   Would the patient rather a call back or a response via [facility]? Call   Best Call Back Number:[phone]  Additional Information:      Pt  stated the prescription levoFLOXacin (LEVAQUIN) 750 MG tablet contains a indegridant that pt is allergic to  
None

## 2024-07-01 ENCOUNTER — PATIENT MESSAGE (OUTPATIENT)
Dept: PULMONOLOGY | Facility: CLINIC | Age: 64
End: 2024-07-01
Payer: COMMERCIAL

## 2024-07-02 ENCOUNTER — OFFICE VISIT (OUTPATIENT)
Dept: INTERNAL MEDICINE | Facility: CLINIC | Age: 64
End: 2024-07-02
Payer: COMMERCIAL

## 2024-07-02 VITALS
DIASTOLIC BLOOD PRESSURE: 72 MMHG | BODY MASS INDEX: 19.03 KG/M2 | HEIGHT: 65 IN | OXYGEN SATURATION: 98 % | HEART RATE: 92 BPM | WEIGHT: 114.19 LBS | SYSTOLIC BLOOD PRESSURE: 126 MMHG

## 2024-07-02 DIAGNOSIS — S22.42XA CLOSED FRACTURE OF MULTIPLE RIBS OF LEFT SIDE, INITIAL ENCOUNTER: Primary | ICD-10-CM

## 2024-07-02 DIAGNOSIS — F41.1 ANXIETY STATE: ICD-10-CM

## 2024-07-02 DIAGNOSIS — E03.9 HYPOTHYROIDISM, UNSPECIFIED TYPE: ICD-10-CM

## 2024-07-02 DIAGNOSIS — E87.1 HYPONATREMIA: ICD-10-CM

## 2024-07-02 DIAGNOSIS — Z72.0 TOBACCO USE: ICD-10-CM

## 2024-07-02 PROCEDURE — 99999 PR PBB SHADOW E&M-EST. PATIENT-LVL IV: CPT | Mod: PBBFAC,,, | Performed by: INTERNAL MEDICINE

## 2024-07-02 RX ORDER — KETOROLAC TROMETHAMINE 30 MG/ML
30 INJECTION, SOLUTION INTRAMUSCULAR; INTRAVENOUS ONCE
Status: COMPLETED | OUTPATIENT
Start: 2024-07-02 | End: 2024-07-02

## 2024-07-02 RX ORDER — HYDROCODONE BITARTRATE AND ACETAMINOPHEN 5; 325 MG/1; MG/1
1 TABLET ORAL EVERY 8 HOURS PRN
Qty: 21 TABLET | Refills: 0 | Status: SHIPPED | OUTPATIENT
Start: 2024-07-02

## 2024-07-02 RX ORDER — KETOROLAC TROMETHAMINE 10 MG/1
10 TABLET, FILM COATED ORAL EVERY 8 HOURS
Qty: 21 TABLET | Refills: 0 | Status: SHIPPED | OUTPATIENT
Start: 2024-07-02 | End: 2024-07-09

## 2024-07-02 RX ORDER — KETOROLAC TROMETHAMINE 30 MG/ML
30 INJECTION, SOLUTION INTRAMUSCULAR; INTRAVENOUS
Status: DISCONTINUED | OUTPATIENT
Start: 2024-07-02 | End: 2024-07-02

## 2024-07-02 RX ADMIN — KETOROLAC TROMETHAMINE 30 MG: 30 INJECTION, SOLUTION INTRAMUSCULAR; INTRAVENOUS at 03:07

## 2024-07-02 NOTE — PROGRESS NOTES
Medical history   Hypothyroid   Hyperlipidemia   Sleep disorder  Anxiety disorder        [age] year female     Reason for visit is follow-up after the fall, noted rib fractures on the left and persistent pain.    She had a fall on June 13th.  She was outside trying to pull on a door handle.  There was water on the ground her left foot slipped on water in his attempt to hold on the door hit a on the left side of the thoracic area then she landed on that side.  It took awhile for the get up in his  has to assist.  Since that time she was having ongoing pain a cough of yellow mucus shortness for breath.  Eventually went urgent care on June 20th.  Chest x-ray did not noted fracture there was minimal pleural effusion.  Because such Lasix 20 mg a day for 5 days was prescribed.  However because of persistent pain and symptoms she was seen by Pulmonary on June June 27th.  She was prescribed levofloxacin but later the pharmacy told him not to take because of in his lactic reaction she had with ciprofloxacin.  She had a CT of the chest that showed minimal displaced fractures posterior from the left 9th rib to the 11th rib.    All during this time she has been taking tend to 12 Advil is a day.  She was been taking hydrocodone-acetaminophen  mg twice a day to help with pain.  She has had this leftover from a previous prescription.  She was currently out of the hydrocodone.    It was noted that in October she had a fall going down steps.  She lost consciousness.  Had a significant concussion.  Also resulted in a right clavicle fracture in laceration over the right override the require stitches.  She was had a balance disorder after was it had to go to physical therapy but recently followed up with Neurology in May thought everything was fine at the time.    Medical history   Hypothyroid  Hyperlipidemia   Chronic insomnia   Anxiety disorder  Prior history of pneumonia   Social history Tobacco use a pack a day    Alcohol use currently 3 beers a day    Medications Xanax 0.5 mg every 6 hours as needed.  She reports not taking this often   Lomotil every 6 hours as needed she reports not taking this often   Vitamin-D 50118 units twice a week  Levothyroxine 0.125 mg daily   Pravastatin 80 mg daily   Zanaflex 4 mg every 8 hours as needed that has not certain how often she was taking this    Examination   Weight 114 lb   BMI 19   Pulse 92   Blood pressure 128/90   Chest clear breath sounds   Heart regular rate rhythm   Abdominal exam is bowel sounds soft nontender  2+ biceps triceps reflexes   jorge when I palpate over the left lateral thoracic ribcage area    It is also noted that on June 27th she had labs with a basic metabolic profile is normal than sodium 130      Impression   Posttraumatic multiple rib fractures, left  Hyponatremia probably due to pain   Hypothyroid   Anxiety disorder  Tobacco use disorder   Alcohol use    Plan   Labs of CBC comprehensive metabolic profile TSH, B12 folate vitamin-D magnesium  Toradol 60 mg IM and then prescriptions Toradol 10 mg t.i.d. for the next week...  During this time she was not to take Advil  Thoracic spine x-ray  Eventually will need to have a bone density scan  Hydrocodone-acetaminophen 5-25 mg t.i.d. for the next week

## 2024-09-19 ENCOUNTER — TELEPHONE (OUTPATIENT)
Dept: ORTHOPEDICS | Facility: CLINIC | Age: 64
End: 2024-09-19

## 2024-09-19 NOTE — TELEPHONE ENCOUNTER
Spoke w/ pt. Pt appointment is cancel for 9/19/24. Pt need to see cardiothoracic provider for her rib injury. Pt  states that she will call back to schedule. Pt was offered an appointment in cardiothoracic. Patient states verbal understanding and has no further questions.

## 2025-01-07 ENCOUNTER — OFFICE VISIT (OUTPATIENT)
Dept: INTERNAL MEDICINE | Facility: CLINIC | Age: 65
End: 2025-01-07
Payer: MEDICARE

## 2025-01-07 VITALS
DIASTOLIC BLOOD PRESSURE: 66 MMHG | BODY MASS INDEX: 18.12 KG/M2 | WEIGHT: 108.94 LBS | HEART RATE: 82 BPM | SYSTOLIC BLOOD PRESSURE: 92 MMHG | OXYGEN SATURATION: 94 %

## 2025-01-07 DIAGNOSIS — I95.9 HYPOTENSION, UNSPECIFIED HYPOTENSION TYPE: ICD-10-CM

## 2025-01-07 DIAGNOSIS — M54.50 LUMBAR BACK PAIN: Primary | ICD-10-CM

## 2025-01-07 PROCEDURE — 99999 PR PBB SHADOW E&M-EST. PATIENT-LVL V: CPT | Mod: PBBFAC,,, | Performed by: STUDENT IN AN ORGANIZED HEALTH CARE EDUCATION/TRAINING PROGRAM

## 2025-01-07 PROCEDURE — 3008F BODY MASS INDEX DOCD: CPT | Mod: CPTII,S$GLB,, | Performed by: STUDENT IN AN ORGANIZED HEALTH CARE EDUCATION/TRAINING PROGRAM

## 2025-01-07 PROCEDURE — 1160F RVW MEDS BY RX/DR IN RCRD: CPT | Mod: CPTII,S$GLB,, | Performed by: STUDENT IN AN ORGANIZED HEALTH CARE EDUCATION/TRAINING PROGRAM

## 2025-01-07 PROCEDURE — 3078F DIAST BP <80 MM HG: CPT | Mod: CPTII,S$GLB,, | Performed by: STUDENT IN AN ORGANIZED HEALTH CARE EDUCATION/TRAINING PROGRAM

## 2025-01-07 PROCEDURE — 96372 THER/PROPH/DIAG INJ SC/IM: CPT | Mod: S$GLB,,, | Performed by: STUDENT IN AN ORGANIZED HEALTH CARE EDUCATION/TRAINING PROGRAM

## 2025-01-07 PROCEDURE — 1159F MED LIST DOCD IN RCRD: CPT | Mod: CPTII,S$GLB,, | Performed by: STUDENT IN AN ORGANIZED HEALTH CARE EDUCATION/TRAINING PROGRAM

## 2025-01-07 PROCEDURE — 99214 OFFICE O/P EST MOD 30 MIN: CPT | Mod: 25,S$GLB,, | Performed by: STUDENT IN AN ORGANIZED HEALTH CARE EDUCATION/TRAINING PROGRAM

## 2025-01-07 PROCEDURE — 1100F PTFALLS ASSESS-DOCD GE2>/YR: CPT | Mod: CPTII,S$GLB,, | Performed by: STUDENT IN AN ORGANIZED HEALTH CARE EDUCATION/TRAINING PROGRAM

## 2025-01-07 PROCEDURE — 3074F SYST BP LT 130 MM HG: CPT | Mod: CPTII,S$GLB,, | Performed by: STUDENT IN AN ORGANIZED HEALTH CARE EDUCATION/TRAINING PROGRAM

## 2025-01-07 PROCEDURE — 3288F FALL RISK ASSESSMENT DOCD: CPT | Mod: CPTII,S$GLB,, | Performed by: STUDENT IN AN ORGANIZED HEALTH CARE EDUCATION/TRAINING PROGRAM

## 2025-01-07 RX ORDER — METHYLPREDNISOLONE 4 MG/1
TABLET ORAL
Qty: 21 TABLET | Refills: 0 | Status: SHIPPED | OUTPATIENT
Start: 2025-01-07

## 2025-01-07 RX ORDER — KETOROLAC TROMETHAMINE 30 MG/ML
30 INJECTION, SOLUTION INTRAMUSCULAR; INTRAVENOUS
Status: COMPLETED | OUTPATIENT
Start: 2025-01-07 | End: 2025-01-07

## 2025-01-07 RX ORDER — CARISOPRODOL 350 MG/1
350 TABLET ORAL 4 TIMES DAILY PRN
Qty: 40 TABLET | Refills: 0 | Status: SHIPPED | OUTPATIENT
Start: 2025-01-07 | End: 2025-01-08 | Stop reason: ALTCHOICE

## 2025-01-07 RX ADMIN — KETOROLAC TROMETHAMINE 30 MG: 30 INJECTION, SOLUTION INTRAMUSCULAR; INTRAVENOUS at 04:01

## 2025-01-07 NOTE — PATIENT INSTRUCTIONS
X-ray - complete as scheduled.     For your pain:   - Steroid course - start tomorrow morning - 5 day course  - Muscle relaxer - take as needed up to 4 times daily  - OK to continue tylenol - do not exceed maximum dose    Referral to Physical Therapy placed - schedule if pain is persistent.     Check BP at home and write values down. If BP is persistently lower than 100/60 please follow-up with your doctor.

## 2025-01-07 NOTE — PROGRESS NOTES
[facility] SAME DAY VISIT      CHIEF COMPLAINT:   Chief Complaint   Patient presents with    Back Pain       HISTORY OF PRESENT ILLNESS: Eden Kuhn is a 64 y.o. female who presents here today for Back Pain. Pain is located bilateral, lower and sometimes radiates down right leg. Some numbness, tingling, weakness in right leg as well. The pain has been present for 2-3 weeks, worsening in this timeframe. Patient denies preceding injury. Denies fever, chills, night sweats, unintentional weight loss, bladder dysfunction, bowel dysfunction, saddle anesthesia. Patient has tried Tylenol arthritis, Tizanidine for the pain with moderate relief. She reports Toradol injection has helped pain in the past. Soma muscle relaxer also helped more than tizanidine in the past. She is in wheelchair today, she normally is ambulatory at home without assistance.     Lumbar MRI 2022: Mild multilevel lumbar spondylosis without evidence of significant spinal canal stenosis or neural foraminal narrowing. Mild right facet edema at L2-3 and L3-4 suggesting inflammation and could be the source of the patient's pain.    BP 92/66 today. No history of high or low BP. She has some dizziness but attributed this to pain.       REVIEW OF SYSTEMS:    ROS as in HPI.       MEDICAL HISTORY:    Past Medical History:   Diagnosis Date    Anxiety     Arthritis     Insomnia     Rib fractures     Thyroid disease     hypothyroid       MEDICATIONS:    Current Outpatient Medications on File Prior to Visit   Medication Sig Dispense Refill    ALPRAZolam (XANAX) 0.5 MG tablet Take 0.5 mg by mouth every 6 (six) hours as needed.      b complex vitamins capsule Take 1 capsule by mouth once daily.      diphenoxylate-atropine 2.5-0.025 mg (LOMOTIL) 2.5-0.025 mg per tablet Take 1 tablet by mouth every 6 (six) hours as needed for Diarrhea.  0    ergocalciferol (ERGOCALCIFEROL) 50,000 unit Cap Take 50,000 Units by mouth twice a week.  5     HYDROcodone-acetaminophen (NORCO) 5-325 mg per tablet Take 1 tablet by mouth every 8 (eight) hours as needed for Pain. 21 tablet 0    pravastatin (PRAVACHOL) 80 MG tablet Take 80 mg by mouth once daily.      tiZANidine (ZANAFLEX) 4 MG tablet Take 4 mg by mouth every 8 (eight) hours as needed.      traZODone (DESYREL) 100 MG tablet Take 100 mg by mouth nightly as needed for Insomnia.      albuterol (PROVENTIL/VENTOLIN HFA) 90 mcg/actuation inhaler Inhale 2 puffs into the lungs every 6 (six) hours as needed for Wheezing or Shortness of Breath. (Patient not taking: Reported on 7/2/2024)  6    furosemide (LASIX) 20 MG tablet Take 1 tablet (20 mg total) by mouth once daily. for 10 days 10 tablet 0    levothyroxine (SYNTHROID) 125 MCG tablet Take 125 mcg by mouth once daily. (Patient not taking: Reported on 1/7/2025)  1    liothyronine (CYTOMEL) 5 MCG Tab TK 1 T PO QAM (Patient not taking: Reported on 1/7/2025)  1    valACYclovir (VALTREX) 500 MG tablet Take 500 mg by mouth once daily. (Patient not taking: Reported on 1/7/2025)  9     No current facility-administered medications on file prior to visit.         PHYSICAL EXAM:    BP 92/66 / Pulse 82   Wt 49.4 kg (108 lb 14.5 oz)   SpO2 (!) 94%   BMI 18.12 kg/m²     Physical Exam  Vitals and nursing note reviewed.   Constitutional:       General: She is not in acute distress.     Appearance: Normal appearance. She is not ill-appearing, toxic-appearing or diaphoretic.   HENT:      Head: Normocephalic and atraumatic.      Nose: Nose normal.   Eyes:      Extraocular Movements: Extraocular movements intact.      Conjunctiva/sclera: Conjunctivae normal.      Pupils: Pupils are equal, round, and reactive to light.   Cardiovascular:      Rate and Rhythm: Normal rate.   Pulmonary:      Effort: Pulmonary effort is normal. No respiratory distress.   Musculoskeletal:         General: No deformity. Normal range of motion.      Cervical back: No bony tenderness.      Thoracic back: No  bony tenderness.      Lumbar back: Spasms and tenderness present. No bony tenderness.        Back:    Skin:     Findings: No lesion or rash.   Neurological:      General: No focal deficit present.      Mental Status: She is alert.      Motor: No weakness.      Gait: Gait normal.   Psychiatric:         Mood and Affect: Mood normal.         Behavior: Behavior normal.         Thought Content: Thought content normal.         Judgment: Judgment normal.               ASSESSMENT & PLAN:    Eden was seen today for back pain.    Diagnoses and all orders for this visit:    Lumbar back pain  Patient presenting with bilateral, lower back pain for 2-3 weeks that radiates down right leg. No preceding injury. No alarm s/s.   On physical exam, patient has tenderness to palpation of lumbar paraspinal regions however no deformity, swelling, bony tenderness. Grossly normal ROM though somewhat limited due to pain.  Ddx: muscle spasm/strain, consider arthritis, herniated disc or other spinal pathology; acute pathology such as fracture or dislocation unlikely given lack of bony tenderness.   XR to evaluate further.   Toradol IM in office today.   Patient has not had relief with Tizanidine, reports relief with Soma in the past - Soma 350 mg QID PRN prescribed.   Medrol dose pack - start tomorrow (of note allergy to ciprocinonide in chart but has tolerated medrol dose pack in the past).   Referral to PT placed - F/U if pain persists.   -     carisoprodoL (SOMA) 350 MG tablet; Take 1 tablet (350 mg total) by mouth 4 (four) times daily as needed for Muscle spasms.  -     ketorolac injection 30 mg  -     X-Ray Lumbar Spine 2 Or 3 Views; Future  -     methylPREDNISolone (MEDROL DOSEPACK) 4 mg tablet; use as directed  -     Ambulatory Referral/Consult to Physical Therapy; Future    Hypotension, unspecified hypotension type  BP 92/66 today.   Ddx: inadequate hydration/nutrition; tizanidine side effect, pain response   Monitor BP at home and  F/U with PCP if persistently low       I spent a total of 32 minutes on the day of the visit.  This includes face to face time and non-face to face time preparing to see the patient (eg, review of tests), obtaining and/or reviewing separately obtained history, documenting clinical information in the electronic or other health record, independently interpreting results and communicating results to the patient/family/caregiver, or care coordinator.          Avis Maldonado MD  [facility]

## 2025-01-08 ENCOUNTER — PATIENT MESSAGE (OUTPATIENT)
Dept: INTERNAL MEDICINE | Facility: CLINIC | Age: 65
End: 2025-01-08
Payer: MEDICARE

## 2025-01-08 ENCOUNTER — HOSPITAL ENCOUNTER (EMERGENCY)
Facility: OTHER | Age: 65
Discharge: HOME | End: 2025-01-08
Attending: EMERGENCY MEDICINE
Payer: MEDICARE

## 2025-01-08 VITALS
BODY MASS INDEX: 17.36 KG/M2 | DIASTOLIC BLOOD PRESSURE: 105 MMHG | WEIGHT: 108 LBS | OXYGEN SATURATION: 99 % | SYSTOLIC BLOOD PRESSURE: 147 MMHG | HEART RATE: 94 BPM | HEIGHT: 66 IN | RESPIRATION RATE: 18 BRPM | TEMPERATURE: 98 F

## 2025-01-08 DIAGNOSIS — S32.020A COMPRESSION FRACTURE OF L2 VERTEBRA, INITIAL ENCOUNTER: ICD-10-CM

## 2025-01-08 DIAGNOSIS — S32.010A COMPRESSION FRACTURE OF L1 VERTEBRA, INITIAL ENCOUNTER: Primary | ICD-10-CM

## 2025-01-08 LAB
ALBUMIN SERPL BCP-MCNC: 3.9 G/DL (ref 3.5–5.2)
ALP SERPL-CCNC: 87 U/L (ref 40–150)
ALT SERPL W/O P-5'-P-CCNC: 12 U/L (ref 10–44)
ANION GAP SERPL CALC-SCNC: 13 MMOL/L (ref 8–16)
AST SERPL-CCNC: 21 U/L (ref 10–40)
BASOPHILS # BLD AUTO: 0.07 K/UL (ref 0–0.2)
BASOPHILS NFR BLD: 0.6 % (ref 0–1.9)
BILIRUB SERPL-MCNC: 0.6 MG/DL (ref 0.1–1)
BUN SERPL-MCNC: 10 MG/DL (ref 8–23)
CALCIUM SERPL-MCNC: 9.1 MG/DL (ref 8.7–10.5)
CHLORIDE SERPL-SCNC: 95 MMOL/L (ref 95–110)
CO2 SERPL-SCNC: 20 MMOL/L (ref 23–29)
CREAT SERPL-MCNC: 0.8 MG/DL (ref 0.5–1.4)
DIFFERENTIAL METHOD BLD: ABNORMAL
EOSINOPHIL # BLD AUTO: 0 K/UL (ref 0–0.5)
EOSINOPHIL NFR BLD: 0.1 % (ref 0–8)
ERYTHROCYTE [DISTWIDTH] IN BLOOD BY AUTOMATED COUNT: 13.2 % (ref 11.5–14.5)
EST. GFR  (NO RACE VARIABLE): >60 ML/MIN/1.73 M^2
GLUCOSE SERPL-MCNC: 69 MG/DL (ref 70–110)
HCT VFR BLD AUTO: 35 % (ref 37–48.5)
HGB BLD-MCNC: 12.6 G/DL (ref 12–16)
IMM GRANULOCYTES # BLD AUTO: 0.05 K/UL (ref 0–0.04)
IMM GRANULOCYTES NFR BLD AUTO: 0.4 % (ref 0–0.5)
LYMPHOCYTES # BLD AUTO: 3.2 K/UL (ref 1–4.8)
LYMPHOCYTES NFR BLD: 25.9 % (ref 18–48)
MCH RBC QN AUTO: 34.1 PG (ref 27–31)
MCHC RBC AUTO-ENTMCNC: 36 G/DL (ref 32–36)
MCV RBC AUTO: 95 FL (ref 82–98)
MONOCYTES # BLD AUTO: 1.3 K/UL (ref 0.3–1)
MONOCYTES NFR BLD: 10.3 % (ref 4–15)
NEUTROPHILS # BLD AUTO: 7.8 K/UL (ref 1.8–7.7)
NEUTROPHILS NFR BLD: 62.7 % (ref 38–73)
NRBC BLD-RTO: 0 /100 WBC
PLATELET # BLD AUTO: 383 K/UL (ref 150–450)
PMV BLD AUTO: 9.4 FL (ref 9.2–12.9)
POTASSIUM SERPL-SCNC: 3.9 MMOL/L (ref 3.5–5.1)
PROT SERPL-MCNC: 7 G/DL (ref 6–8.4)
RBC # BLD AUTO: 3.7 M/UL (ref 4–5.4)
SODIUM SERPL-SCNC: 128 MMOL/L (ref 136–145)
WBC # BLD AUTO: 12.37 K/UL (ref 3.9–12.7)

## 2025-01-08 PROCEDURE — 99284 EMERGENCY DEPT VISIT MOD MDM: CPT | Mod: 25

## 2025-01-08 PROCEDURE — 80053 COMPREHEN METABOLIC PANEL: CPT | Performed by: EMERGENCY MEDICINE

## 2025-01-08 PROCEDURE — 96361 HYDRATE IV INFUSION ADD-ON: CPT

## 2025-01-08 PROCEDURE — 96374 THER/PROPH/DIAG INJ IV PUSH: CPT

## 2025-01-08 PROCEDURE — 85025 COMPLETE CBC W/AUTO DIFF WBC: CPT | Performed by: EMERGENCY MEDICINE

## 2025-01-08 PROCEDURE — 63600175 PHARM REV CODE 636 W HCPCS: Performed by: EMERGENCY MEDICINE

## 2025-01-08 PROCEDURE — 25000003 PHARM REV CODE 250: Performed by: EMERGENCY MEDICINE

## 2025-01-08 RX ORDER — OXYCODONE HYDROCHLORIDE 5 MG/1
5 TABLET ORAL
Status: COMPLETED | OUTPATIENT
Start: 2025-01-08 | End: 2025-01-08

## 2025-01-08 RX ORDER — METHOCARBAMOL 500 MG/1
500 TABLET, FILM COATED ORAL 3 TIMES DAILY PRN
Qty: 15 TABLET | Refills: 0 | Status: SHIPPED | OUTPATIENT
Start: 2025-01-08

## 2025-01-08 RX ORDER — ORPHENADRINE CITRATE 30 MG/ML
60 INJECTION INTRAMUSCULAR; INTRAVENOUS
Status: DISCONTINUED | OUTPATIENT
Start: 2025-01-08 | End: 2025-01-09 | Stop reason: HOSPADM

## 2025-01-08 RX ORDER — METHOCARBAMOL 500 MG/1
500 TABLET, FILM COATED ORAL
Status: COMPLETED | OUTPATIENT
Start: 2025-01-08 | End: 2025-01-08

## 2025-01-08 RX ORDER — HYDROCODONE BITARTRATE AND ACETAMINOPHEN 5; 325 MG/1; MG/1
1 TABLET ORAL
Status: COMPLETED | OUTPATIENT
Start: 2025-01-08 | End: 2025-01-08

## 2025-01-08 RX ORDER — KETOROLAC TROMETHAMINE 30 MG/ML
10 INJECTION, SOLUTION INTRAMUSCULAR; INTRAVENOUS
Status: COMPLETED | OUTPATIENT
Start: 2025-01-08 | End: 2025-01-08

## 2025-01-08 RX ORDER — HYDROCODONE BITARTRATE AND ACETAMINOPHEN 10; 325 MG/1; MG/1
1 TABLET ORAL EVERY 6 HOURS PRN
Qty: 12 TABLET | Refills: 0 | Status: SHIPPED | OUTPATIENT
Start: 2025-01-08

## 2025-01-08 RX ORDER — LIDOCAINE 50 MG/G
2 PATCH TOPICAL
Status: DISCONTINUED | OUTPATIENT
Start: 2025-01-08 | End: 2025-01-09 | Stop reason: HOSPADM

## 2025-01-08 RX ORDER — NAPROXEN 500 MG/1
500 TABLET ORAL 2 TIMES DAILY PRN
Qty: 14 TABLET | Refills: 0 | Status: SHIPPED | OUTPATIENT
Start: 2025-01-08 | End: 2025-01-15

## 2025-01-08 RX ADMIN — METHOCARBAMOL 500 MG: 500 TABLET ORAL at 04:01

## 2025-01-08 RX ADMIN — KETOROLAC TROMETHAMINE 10 MG: 30 INJECTION, SOLUTION INTRAMUSCULAR; INTRAVENOUS at 04:01

## 2025-01-08 RX ADMIN — LIDOCAINE 2 PATCH: 50 PATCH CUTANEOUS at 04:01

## 2025-01-08 RX ADMIN — HYDROCODONE BITARTRATE AND ACETAMINOPHEN 1 TABLET: 5; 325 TABLET ORAL at 04:01

## 2025-01-08 RX ADMIN — SODIUM CHLORIDE 1000 ML: 9 INJECTION, SOLUTION INTRAVENOUS at 06:01

## 2025-01-08 RX ADMIN — OXYCODONE HYDROCHLORIDE 5 MG: 5 TABLET ORAL at 07:01

## 2025-01-08 NOTE — ED PROVIDER NOTES
"  Source of History:  Medical record, patient  Independent history obtained from : spouse    Chief complaint:  Per triage note: "Back Pain (upper mid back pain radiating to spine and bilateral flank x 2 weeks. )  "    HPI:    Patient presents for evaluation of midline thoracic back pain radiating down her spine, and laterally across both flanks.  She denies any focal deficits.  Pain has been present for about the last 2 weeks.  She denies any associated trauma, stating that the pain started after she lifted a cart which did not feel particularly heavy.  Symptoms not improved with frequent use of NSAIDs.    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  She denies decreased oral intake, but admits that she commonly has very little food intake.  She and her  report that she drinks a lot of water daily, including the past several days.    Patient reports 2 recent falls with injury.  In the spring she had a mechanical fall and sustained multiple rib fractures.  About 6 weeks ago, she had another mechanical fall, suffered a concussion and right clavicle fracture.     ROS:   See HPI for pertinent review of systems    General: No fever.   HENT: No facial pain.  Eyes: No eye pain.   Cardiovascular: No chest pain.   Respiratory: No dyspnea.   GI: No abdominal pain. No n/v. No stool incontinence/retention.   : No urine incontinence/retention.  Neuro: No saddle anesthesia. No focal strength or sensation deficits.   Musculoskeletal: Notes back pain. No swelling.        Review of patient's allergies indicates:   Allergen Reactions    Ciprocinonide Anaphylaxis       PMH:  As per HPI and below:  Past Medical History:   Diagnosis Date    Anxiety     Arthritis     Insomnia     Rib fractures     Thyroid disease     hypothyroid       Past Surgical History:   Procedure Laterality Date    ANAL FISTULOTOMY  01/21/2002    bladder repair      CYSTOCELE " "REPAIR  06/06/2011    ESOPHAGOGASTRODUODENOSCOPY N/A 8/3/2018    Procedure: EGD (ESOPHAGOGASTRODUODENOSCOPY);  Surgeon: Adam Olivera MD;  Location: [facility] ([address] [place] [address]);  Service: Endoscopy;  Laterality: N/A;  Patient is VIP patient.  Plan to do during the week of July 16-20.  May cancel as it gets closer if she improves, but want to have it on the books.    Patient requested this date due to insurance.    EYE SURGERY  02/23/2012    Bilateral upper lid blepharoplasty with levator fixation    HYSTERECTOMY         Social History     Tobacco Use    Smoking status: Every Day     Current packs/day: 0.50     Average packs/day: 0.5 packs/day for 13.0 years (6.5 ttl pk-yrs)     Types: Cigarettes    Smokeless tobacco: Never   Substance Use Topics    Alcohol use: Yes     Alcohol/week: 2.0 - 3.0 standard drinks of alcohol     Types: 2 - 3 Cans of beer per week    Drug use: Yes     Types: Marijuana       Physical Exam:      Nursing note and vitals reviewed.    BP (!) 147/105   Pulse (!) 113   Temp 97.7 °F (36.5 °C) (Oral)   Resp 18   Ht 5' 6" (1.676 m)   Wt 49 kg (108 lb 0.4 oz)   SpO2 99%   BMI 17.44 kg/m²     Constitutional:  Awake, alert.  Very uncomfortable due to obvious pain.  Thin frame.  Eyes: EOMI. No discharge. Anicteric.  HENT:  Dry mucous membranes.  Neck: Normal range of motion. Neck supple.  No midline spinal tenderness, step-offs, or deformities.  Cardiovascular: Normal rate. No murmur, no gallop and no friction rub heard.   Pulmonary/Chest: No respiratory distress. Effort normal. No wheezes, no rales, no rhonchi.   Abdominal: Bowel sounds normal. Soft. No distension and no mass. There is no tenderness. There is no rebound, no guarding, no tenderness at McBurney's point.  Musculoskeletal: Normal range of motion.  No bony tenderness at large joints or long bones.  No midline spinal tenderness, step-offs, or deformities.  Localizes pain from approximately T4 down to L spine in the midline and paraspinal " "lumbar area.   Neurological:  GCS 15. Awake, alert, oriented. No gross cranial nerve, light touch or strength deficit. Coordination normal.   Skin: Skin is warm and dry.   EXT: 2+ radial pulses.   Psychiatric: Behavior is normal. Judgment normal.  Pleasant.    Medical Decision Making / Independent Interpretations / External Records Reviewed:        Patient is a 64-year-old female with history of anxiety, history of hypothyroid who presents for evaluation of midline back pain over last 2 weeks after she lifted a cart.  She denies any direct trauma.  She describes 2 mechanical falls with injury in the past year, but denies any unsteadiness, presyncope stating that these were simple mechanical falls.   On exam, patient had no midline spinal tenderness, but appears very uncomfortable.  She was no focal deficits.  The initial differential included fracture, dislocation.  I doubt infectious cause to her pain as she denies any fevers or urinary symptoms.         ED Course as of 01/08/25 2008 Wed Jan 08, 2025 1859 I independently interpreted the patient's films, notable for "mild superior endplate compression deformities of the L1 and L2 vertebral bodies."      Patient history, findings, results, patient management discussed with orthopedist Dr. Boyd.  Recommends TLSO brace, can see patient in his clinic.      Patient was initially tachycardic.  On my reassessment, she reported mild improvement of her pain.  She elaborate that she has hardly gotten out of bed the past several days.  She reports that she commonly has very limited food intake, not this is likely been especially the case in the last several days.  I discussed with the patient and her  that if she was unable to walk, she will have to be admitted.  Patient stated that she has extremely motivated to ambulate and be discharged.   Given her risk of dehydration, acute kidney injury, I ordered screening labs, IV fluids, as well as additional symptomatic " treatment. [RC]   1930   Patient states her pain is well-controlled.  She was able to ambulate without difficulty.  TLSO brace was fitted.   --  I discussed with pt and/or guardian/caretaker that this evaluation in the ED does not suggest any emergent or life threatening medical condition requiring admission or further immediate intervention or diagnostics. Regardless, an unremarkable evaluation in the ED does not preclude the development or presence of a serious or life threatening condition. Pt was instructed to return for any worsening, new, changed, or concerning symptoms.     I had a detailed discussion with patient and/or guardian/caretaker regarding findings, plan, return precautions, importance of medication adherence, need to follow-up with a PCP and specialist. All questions answered.     Note was created using voice recognition software. It may have occasional typographical errors not identified and edited despite initial review prior to signing.   [RC]   2008 Repeat heart rate 88-92 on the monitor.  [RC]      ED Course User Index  [RC] James Cid MD              Procedures    --  I decided to obtain the patient's medical records. I reviewed patient's prior external notes / results: specialist documentation   .   --  Additional Medical Decision Making: Prescription drug management, Hospitalization or escalation of care considered, Parenteral controlled substance ordered or considered, and Decision regarding advanced imaging    Pt seen at a time of critical national shortage of IV fluids, affecting decision making and treatment considerations.       Medications   LIDOcaine 5 % patch 2 patch (2 patches Transdermal Patch Applied 1/8/25 1645)   orphenadrine injection 60 mg (60 mg Intravenous Not Given 1/8/25 1815)   HYDROcodone-acetaminophen 5-325 mg per tablet 1 tablet (1 tablet Oral Given 1/8/25 1644)   ketorolac injection 9.999 mg (9.999 mg Intravenous Given 1/8/25 1643)   methocarbamoL tablet 500  mg (500 mg Oral Given 1/8/25 1643)   sodium chloride 0.9% bolus 1,000 mL 1,000 mL (0 mLs Intravenous Stopped 1/8/25 1953)   oxyCODONE immediate release tablet 5 mg (5 mg Oral Given 1/8/25 1924)              No future appointments.       Diagnostic Impression:    1. Compression fracture of L1 vertebra, initial encounter    2. Compression fracture of L2 vertebra, initial encounter             ED Disposition Condition    Discharge Good          ED Prescriptions       Medication Sig Dispense Start Date End Date Auth. Provider    HYDROcodone-acetaminophen (NORCO)  mg per tablet Take 1 tablet by mouth every 6 (six) hours as needed for Pain. 12 tablet 1/8/2025 -- James Cid MD    methocarbamoL (ROBAXIN) 500 MG Tab Take 1 tablet (500 mg total) by mouth 3 (three) times daily as needed. Take 1-2 tablets three times daily as needed for muscle spasm pain 15 tablet 1/8/2025 -- James Cid MD    naproxen (NAPROSYN) 500 MG tablet Take 1 tablet (500 mg total) by mouth 2 (two) times daily as needed. 14 tablet 1/8/2025 1/15/2025 James Cid MD          Follow-up Information       Follow up With Specialties Details Why Contact Info    Austin Boyd MD Orthopedic Surgery Schedule an appointment as soon as possible for a visit  For recheck with specialist [address] [place] [phone]  [phone]                 James Cid MD  01/08/25 2004       James Cid MD  01/08/25 2008

## 2025-01-09 NOTE — DISCHARGE INSTRUCTIONS

## 2025-01-09 NOTE — ED TRIAGE NOTES
Pt presents to ED c/o back pain x2 weeks. Denies any trauma. Reports taking NSAIDS with no relief. Reports two recent falls with injury. Denies any other complaints at this time. Aaox4, NAD noted

## 2025-02-17 ENCOUNTER — TELEPHONE (OUTPATIENT)
Dept: ORTHOPEDICS | Facility: CLINIC | Age: 65
End: 2025-02-17
Payer: MEDICARE

## 2025-02-17 NOTE — TELEPHONE ENCOUNTER
Spoke with pt. Pt schedule herself incorrectly. Pt appointment is cancel 2/21/25. Pt is reschedule with back and spine 2/28/25. Patient states verbal understanding and has no further questions.

## 2025-02-18 NOTE — PROGRESS NOTES
"DATE: 2/28/2025  PATIENT: Eden Kuhn    Supervising Physician: Arnie Perez M.D.    CHIEF COMPLAINT: back pain    HISTORY:  Eden Kuhn is a 64 y.o. female here for initial evaluation of low back pain (Back - 7).  The pain in the low back and is what bothers her most.  The pain has been present for 2 months since 12/2024 after she tried lifting a shopping cart over a ledge. The patient describes the pain as constant and as if "someone's knuckle is digging in her back."  The pain is worse with everything and improved by rest. There is no associated numbness and tingling. There is no subjective weakness. Prior treatments have included PT, Norco, Robaxin, medrol dose pack, Tylenol, Zanaflex, but no injections or surgery.  The patient denies myelopathic symptoms such as handwriting changes or difficulty with buttons/coins/keys. Denies perineal paresthesias, bowel/bladder dysfunction. No recent falls or acute injury.  Patient notes in 10/2023 she fell and fractured her clavicle and got a concussion. In 6/2024 she fell and broke 3 ribs. She completed a DEXA 2/26/25 that showed osteopenia and she is already following w/ endocrinology.    PAST MEDICAL/SURGICAL HISTORY:  Past Medical History:   Diagnosis Date    Anxiety     Arthritis     Insomnia     Rib fractures     Thyroid disease     hypothyroid     Past Surgical History:   Procedure Laterality Date    ANAL FISTULOTOMY  01/21/2002    bladder repair      CYSTOCELE REPAIR  06/06/2011    ESOPHAGOGASTRODUODENOSCOPY N/A 8/3/2018    Procedure: EGD (ESOPHAGOGASTRODUODENOSCOPY);  Surgeon: Adam Olivera MD;  Location: [address]);  Service: Endoscopy;  Laterality: N/A;  Patient is VIP patient.  Plan to do during the week of July 16-20.  May cancel as it gets closer if she improves, but want to have it on the books.    Patient requested this date due to insurance.    EYE SURGERY  02/23/2012    Bilateral upper lid blepharoplasty with levator fixation    " "HYSTERECTOMY         Medications:   Medications Ordered Prior to Encounter[1]    Social History: Social History[2]    REVIEW OF SYSTEMS:  Constitution: Negative. Negative for chills, fever and night sweats.   Cardiovascular: Negative for chest pain and syncope.   Respiratory: Negative for cough and shortness of breath.   Gastrointestinal: See HPI. Negative for nausea/vomiting. Negative for abdominal pain.  Genitourinary: See HPI. Negative for discoloration or dysuria.  Skin: Negative for dry skin, itching and rash.   Hematologic/Lymphatic: Negative for bleeding problem. Does not bruise/bleed easily.   Musculoskeletal: Negative for falls and muscle weakness.   Neurological: See HPI. No seizures.   Endocrine: Negative for polydipsia, polyphagia and polyuria.   Allergic/Immunologic: Negative for hives and persistent infections.     EXAM:  Ht 5' 6" (1.676 m)   Wt 50.8 kg (111 lb 15.9 oz)   BMI 18.08 kg/m²     General: The patient is a 64 y.o. female in no apparent distress, the patient is oriented to person, place and time.  Psych: Normal mood and affect  HEENT: Vision grossly intact, hearing intact to the spoken word.  Lungs: Respirations unlabored.  Gait: Normal station and gait, no difficulty with toe or heel walk.   Skin: Dorsal lumbar skin negative for rashes, lesions, hairy patches and surgical scars. There is no lumbar tenderness to palpation.  Range of motion: Lumbar range of motion is acceptable.  Spinal Balance: Global saggital and coronal spinal balance acceptable, not significant for scoliosis and kyphosis.  Musculoskeletal: No pain with the range of motion of the bilateral hips. No trochanteric tenderness to palpation.  Vascular: Bilateral lower extremities warm and well perfused, dorsalis pedis pulses 2+ bilaterally.  Neurological: Normal strength and tone in all major motor groups in the bilateral lower extremities. Normal sensation to light touch in the L2-S1 dermatomes bilaterally.  Deep tendon " reflexes symmetric 2+ in the bilateral lower extremities.  Negative Babinski bilaterally. Straight leg raise negative bilaterally.    IMAGING:   Today I personally reviewed AP, Lat and Flex/Ex  upright L-spine films that demonstrate chronic L1 and L2 compression fractures      Body mass index is 18.08 kg/m².    Hemoglobin A1C   Date Value Ref Range Status   12/02/2004 4.8 4.5 - 6.2 % Final   08/27/2004 5.0 4.5 - 6.2 % Final           ASSESSMENT/PLAN:    Eden was seen today for low-back pain and back pain.    Diagnoses and all orders for this visit:    Dorsalgia, unspecified  -     MRI Lumbar Spine Without Contrast; Future    Compression fracture of L1 vertebra, initial encounter  -     Ambulatory referral/consult to Orthopedics  -     MRI Lumbar Spine Without Contrast; Future  -     Ambulatory referral/consult to [facility]; Future    Compression fracture of L2 vertebra, initial encounter  -     Ambulatory referral/consult to Orthopedics  -     MRI Lumbar Spine Without Contrast; Future  -     Ambulatory referral/consult to [facility]; Future    Lumbar radiculopathy  -     MRI Lumbar Spine Without Contrast; Future  -     Ambulatory referral/consult to [facility]; Future      Today we discussed at length all of the different treatment options including anti-inflammatories, acetaminophen, rest, ice, heat, physical therapy including strengthening and stretching exercises, home exercises, ROM, aerobic conditioning, aqua therapy, other modalities including ultrasound, massage, and dry needling, epidural steroid injections and finally surgical intervention.    MRI ordered, follow up for results  Referral to healthy back program placed         [1]   Current Outpatient Medications on File Prior to Visit   Medication Sig Dispense Refill    ALPRAZolam (XANAX) 0.5 MG tablet Take 0.5 mg by mouth every 6 (six) hours as needed.      b complex vitamins capsule Take 1 capsule by mouth once daily.       diphenoxylate-atropine 2.5-0.025 mg (LOMOTIL) 2.5-0.025 mg per tablet Take 1 tablet by mouth every 6 (six) hours as needed for Diarrhea.  0    ergocalciferol (ERGOCALCIFEROL) 50,000 unit Cap Take 50,000 Units by mouth twice a week.  5    HYDROcodone-acetaminophen (NORCO)  mg per tablet Take 1 tablet by mouth every 6 (six) hours as needed for Pain. 12 tablet 0    methocarbamoL (ROBAXIN) 500 MG Tab Take 1 tablet (500 mg total) by mouth 3 (three) times daily as needed. Take 1-2 tablets three times daily as needed for muscle spasm pain 15 tablet 0    pravastatin (PRAVACHOL) 80 MG tablet Take 80 mg by mouth once daily.      traZODone (DESYREL) 100 MG tablet Take 100 mg by mouth nightly as needed for Insomnia.      [DISCONTINUED] methylPREDNISolone (MEDROL DOSEPACK) 4 mg tablet use as directed 21 tablet 0    furosemide (LASIX) 20 MG tablet Take 1 tablet (20 mg total) by mouth once daily. for 10 days 10 tablet 0     No current facility-administered medications on file prior to visit.   [2]   Social History  Socioeconomic History    Marital status:    Tobacco Use    Smoking status: Every Day     Current packs/day: 0.50     Average packs/day: 0.5 packs/day for 13.0 years (6.5 ttl pk-yrs)     Types: Cigarettes    Smokeless tobacco: Never   Substance and Sexual Activity    Alcohol use: Yes     Alcohol/week: 2.0 - 3.0 standard drinks of alcohol     Types: 2 - 3 Cans of beer per week    Drug use: Yes     Types: Marijuana    Sexual activity: Yes     Partners: Male

## 2025-02-28 ENCOUNTER — OFFICE VISIT (OUTPATIENT)
Dept: ORTHOPEDICS | Facility: CLINIC | Age: 65
End: 2025-02-28
Payer: MEDICARE

## 2025-02-28 ENCOUNTER — TELEPHONE (OUTPATIENT)
Dept: ORTHOPEDICS | Facility: CLINIC | Age: 65
End: 2025-02-28

## 2025-02-28 VITALS — BODY MASS INDEX: 18 KG/M2 | WEIGHT: 112 LBS | HEIGHT: 66 IN

## 2025-02-28 DIAGNOSIS — M54.9 DORSALGIA, UNSPECIFIED: Primary | ICD-10-CM

## 2025-02-28 DIAGNOSIS — M54.16 LUMBAR RADICULOPATHY: ICD-10-CM

## 2025-02-28 DIAGNOSIS — S32.010A COMPRESSION FRACTURE OF L1 VERTEBRA, INITIAL ENCOUNTER: ICD-10-CM

## 2025-02-28 DIAGNOSIS — S32.020A COMPRESSION FRACTURE OF L2 VERTEBRA, INITIAL ENCOUNTER: ICD-10-CM

## 2025-02-28 PROCEDURE — 99999 PR PBB SHADOW E&M-EST. PATIENT-LVL III: CPT | Mod: PBBFAC,,, | Performed by: REGISTERED NURSE

## 2025-02-28 NOTE — TELEPHONE ENCOUNTER
Spoke to patient regarding mri and f/u appointment. Patient scheduled mri scheduled for 12:30 pm on 03/10/2025 and f/u for 3:00 pm on 03/12/2025. Patient stated that would be fine. Patient stated thank you. Thanks.

## 2025-02-28 NOTE — PROGRESS NOTES
"DATE: 3/12/2025  PATIENT: Eden Kuhn    Attending Physician: Arnie Perez M.D.    HISTORY:  Eden Kuhn is a 64 y.o. female who returns to me today for MRI results.  She was last seen by me 2/28/2025.  Today she is doing well but notes low back pain (Back - 7).   The Patient denies myelopathic symptoms such as handwriting changes or difficulty with buttons/coins/keys. Denies perineal paresthesias, bowel/bladder dysfunction.    EXAM:  Ht 5' 3" (1.6 m)   Wt 51.1 kg (112 lb 10.5 oz)   BMI 19.96 kg/m²   Stable     IMAGING:  Today I personally re- reviewed AP, Lat and Flex/Ex  upright L-spine that demonstrate chronic L1 and L2 compression fractures     Lumbar MRI shows unhealed L1 and L2 compression fractures.     Body mass index is 19.96 kg/m².    Hemoglobin A1C   Date Value Ref Range Status   12/02/2004 4.8 4.5 - 6.2 % Final   08/27/2004 5.0 4.5 - 6.2 % Final         ASSESSMENT/PLAN:    Eden was seen today for low-back pain, back pain and follow-up.    Diagnoses and all orders for this visit:    Compression fracture of L1 vertebra, sequela  -     naproxen (NAPROSYN) 500 MG tablet; Take 1 tablet (500 mg total) by mouth 2 (two) times daily with meals.    Closed compression fracture of L2 lumbar vertebra, sequela  -     naproxen (NAPROSYN) 500 MG tablet; Take 1 tablet (500 mg total) by mouth 2 (two) times daily with meals.        Follow up with pain mgmt for a kyphoplasty and possible RFAs.       "

## 2025-03-06 NOTE — PROGRESS NOTES
Medical history   Hypothyroid  Hyperlipidemia   Chronic insomnia   Anxiety disorder  Prior history of pneumonia     Social history Tobacco use a pack a day   Alcohol use currently 3 beers a day     Medications   Alprazolam 0.5 mg every 6 hours as needed  Lomotil as needed  Vitamin-D 02127 units twice a week   Levothyroxine 0.75 mg   Pravastatin 80 mg  Zanaflex 6 mg b.i.d. as needed  Hydrocodone-acetaminophen t  mg or oxycodone-acetaminophen  mg  Sodium chloride tablets 1 g daily      [age] female   Reason for the visit that has the determine if that has any thing going on with the thoracic spine or thoracic ribs.    December 22nd she was picking up something.  Felt a crack in his back.  She was having ongoing mid lumbar pain in his extended laterally to the left, right side in his lower lumbar region.  She went urgent care January 7th then later went to emergency room on January 8th.  Lumbar spine x-ray noted compression deformity at L1 to could not tell if it was older not but was new compared to when having a CT back June 2024.  She followed up with a spine surgery in his couple days will have an MRI of the lumbar spine.    But in addition she has been having pain over the left posterior lateral thoracic region toward the lower area.  She was seen in June 2024 for suspected rib fracture.  Rib x-ray was unrevealing when she had a CT of the chest that has showed nondisplaced posterior rib fractures 9-11.  She can feel a crunching type pain in his area.  When moving.    She is also followed by endocrinologist.  It appears on generally 14th 100 hydrocodone prescribed on February 3rd 200 oxycodone was prescribed she is also taking the muscle relaxant tizanidine in not hat of methocarbamol    No chest pain some back pain with taking a deep breath no abdominal pain    Examination   Weight 111 lb   BMI 17   Pulse 100  Blood pressure 130/72   Chest clear breath sounds  Heart regular rate rhythm    Abdominal exam is bowel sounds soft nontender  Tender points when I palpate over the posterior lateral aspect of the left thumb thoracic area    Impression   Chronic thoracic pain.  History of rib fractures.    Lumbar compression deformity    Plan   CT of the chest.  That has CT of the thoracic vertebrae.

## 2025-03-07 ENCOUNTER — OFFICE VISIT (OUTPATIENT)
Dept: INTERNAL MEDICINE | Facility: CLINIC | Age: 65
End: 2025-03-07
Payer: MEDICARE

## 2025-03-07 VITALS
HEIGHT: 66 IN | BODY MASS INDEX: 17.86 KG/M2 | HEART RATE: 117 BPM | SYSTOLIC BLOOD PRESSURE: 144 MMHG | DIASTOLIC BLOOD PRESSURE: 102 MMHG | WEIGHT: 111.13 LBS | OXYGEN SATURATION: 98 %

## 2025-03-07 DIAGNOSIS — G89.29 CHRONIC LEFT-SIDED THORACIC BACK PAIN: Primary | ICD-10-CM

## 2025-03-07 DIAGNOSIS — M54.6 CHRONIC LEFT-SIDED THORACIC BACK PAIN: Primary | ICD-10-CM

## 2025-03-07 DIAGNOSIS — S32.020D COMPRESSION FRACTURE OF L2 VERTEBRA WITH ROUTINE HEALING, SUBSEQUENT ENCOUNTER: ICD-10-CM

## 2025-03-07 PROCEDURE — 99999 PR PBB SHADOW E&M-EST. PATIENT-LVL IV: CPT | Mod: PBBFAC,,, | Performed by: INTERNAL MEDICINE

## 2025-03-07 RX ORDER — OXYCODONE AND ACETAMINOPHEN 10; 325 MG/1; MG/1
1 TABLET ORAL EVERY 6 HOURS PRN
COMMUNITY
Start: 2025-02-03

## 2025-03-07 RX ORDER — LEVOTHYROXINE SODIUM 75 UG/1
75 TABLET ORAL
COMMUNITY
Start: 2025-01-02

## 2025-03-07 RX ORDER — SODIUM CHLORIDE 1 G/1
1000 TABLET ORAL 3 TIMES DAILY
COMMUNITY
Start: 2025-02-03

## 2025-03-07 RX ORDER — TIZANIDINE HYDROCHLORIDE 6 MG/1
12 CAPSULE, GELATIN COATED ORAL EVERY 8 HOURS PRN
COMMUNITY
Start: 2025-02-03

## 2025-03-10 ENCOUNTER — HOSPITAL ENCOUNTER (OUTPATIENT)
Dept: RADIOLOGY | Facility: HOSPITAL | Age: 65
Discharge: HOME | End: 2025-03-10
Attending: REGISTERED NURSE
Payer: MEDICARE

## 2025-03-10 DIAGNOSIS — S32.020A COMPRESSION FRACTURE OF L2 VERTEBRA, INITIAL ENCOUNTER: ICD-10-CM

## 2025-03-10 DIAGNOSIS — M54.16 LUMBAR RADICULOPATHY: ICD-10-CM

## 2025-03-10 DIAGNOSIS — S32.010A COMPRESSION FRACTURE OF L1 VERTEBRA, INITIAL ENCOUNTER: ICD-10-CM

## 2025-03-10 DIAGNOSIS — M54.9 DORSALGIA, UNSPECIFIED: ICD-10-CM

## 2025-03-10 PROCEDURE — 72148 MRI LUMBAR SPINE W/O DYE: CPT | Mod: 26,,, | Performed by: RADIOLOGY

## 2025-03-10 PROCEDURE — 72148 MRI LUMBAR SPINE W/O DYE: CPT | Mod: TC

## 2025-03-12 ENCOUNTER — OFFICE VISIT (OUTPATIENT)
Dept: ORTHOPEDICS | Facility: CLINIC | Age: 65
End: 2025-03-12
Payer: MEDICARE

## 2025-03-12 VITALS — WEIGHT: 112.63 LBS | HEIGHT: 63 IN | BODY MASS INDEX: 19.96 KG/M2

## 2025-03-12 DIAGNOSIS — S32.020S CLOSED COMPRESSION FRACTURE OF L2 LUMBAR VERTEBRA, SEQUELA: ICD-10-CM

## 2025-03-12 DIAGNOSIS — S32.010S COMPRESSION FRACTURE OF L1 VERTEBRA, SEQUELA: Primary | ICD-10-CM

## 2025-03-12 PROCEDURE — 99999 PR PBB SHADOW E&M-EST. PATIENT-LVL III: CPT | Mod: PBBFAC,,, | Performed by: REGISTERED NURSE

## 2025-03-12 RX ORDER — NAPROXEN 500 MG/1
500 TABLET ORAL 2 TIMES DAILY WITH MEALS
Qty: 60 TABLET | Refills: 0 | Status: SHIPPED | OUTPATIENT
Start: 2025-03-12

## 2025-03-13 ENCOUNTER — TELEPHONE (OUTPATIENT)
Dept: PAIN MEDICINE | Facility: CLINIC | Age: 65
End: 2025-03-13
Payer: MEDICARE

## 2025-03-13 NOTE — TELEPHONE ENCOUNTER
----- Message from Obey Huston DO sent at 3/13/2025  9:35 AM CDT -----  For sure. Thank youHeyzel - can you offer her a 4pm consult appointment on the 24th or the 27th?  ----- Message -----  From: DOUGLAS Lu NP  Sent: 3/12/2025   3:10 PM CDT  To: Obey Huston DO; Otoniel Junior A#    Hey, can we please get this patient in for a L1 & L2 kyphoplasty?

## 2025-03-19 ENCOUNTER — HOSPITAL ENCOUNTER (OUTPATIENT)
Dept: RADIOLOGY | Facility: HOSPITAL | Age: 65
Discharge: HOME | End: 2025-03-19
Attending: INTERNAL MEDICINE
Payer: MEDICARE

## 2025-03-19 DIAGNOSIS — M54.6 CHRONIC LEFT-SIDED THORACIC BACK PAIN: ICD-10-CM

## 2025-03-19 DIAGNOSIS — S32.020D COMPRESSION FRACTURE OF L2 VERTEBRA WITH ROUTINE HEALING, SUBSEQUENT ENCOUNTER: ICD-10-CM

## 2025-03-19 DIAGNOSIS — G89.29 CHRONIC LEFT-SIDED THORACIC BACK PAIN: ICD-10-CM

## 2025-03-19 PROCEDURE — 71250 CT THORAX DX C-: CPT | Mod: 26,,, | Performed by: RADIOLOGY

## 2025-03-19 PROCEDURE — 71250 CT THORAX DX C-: CPT | Mod: TC

## 2025-03-19 PROCEDURE — 72128 CT CHEST SPINE W/O DYE: CPT | Mod: 26,,, | Performed by: RADIOLOGY

## 2025-03-19 PROCEDURE — 72128 CT CHEST SPINE W/O DYE: CPT | Mod: TC

## 2025-03-24 DIAGNOSIS — Z00.00 ENCOUNTER FOR MEDICARE ANNUAL WELLNESS EXAM: ICD-10-CM

## 2025-03-25 ENCOUNTER — OFFICE VISIT (OUTPATIENT)
Dept: PAIN MEDICINE | Facility: CLINIC | Age: 65
End: 2025-03-25
Payer: MEDICARE

## 2025-03-25 VITALS
DIASTOLIC BLOOD PRESSURE: 84 MMHG | HEART RATE: 66 BPM | BODY MASS INDEX: 20.51 KG/M2 | HEIGHT: 63 IN | WEIGHT: 115.75 LBS | SYSTOLIC BLOOD PRESSURE: 132 MMHG

## 2025-03-25 DIAGNOSIS — M80.08XA AGE-RELATED OSTEOPOROSIS WITH CURRENT PATHOLOGICAL FRACTURE, VERTEBRA(E), INITIAL ENCOUNTER FOR FRACTURE: ICD-10-CM

## 2025-03-25 DIAGNOSIS — S32.020A COMPRESSION FRACTURE OF L2 VERTEBRA, INITIAL ENCOUNTER: ICD-10-CM

## 2025-03-25 DIAGNOSIS — S32.010A COMPRESSION FRACTURE OF L1 LUMBAR VERTEBRA, CLOSED, INITIAL ENCOUNTER: Primary | ICD-10-CM

## 2025-03-25 PROCEDURE — 3288F FALL RISK ASSESSMENT DOCD: CPT | Mod: CPTII,S$GLB,, | Performed by: EMERGENCY MEDICINE

## 2025-03-25 PROCEDURE — 1125F AMNT PAIN NOTED PAIN PRSNT: CPT | Mod: CPTII,S$GLB,, | Performed by: EMERGENCY MEDICINE

## 2025-03-25 PROCEDURE — 1159F MED LIST DOCD IN RCRD: CPT | Mod: CPTII,S$GLB,, | Performed by: EMERGENCY MEDICINE

## 2025-03-25 PROCEDURE — 1100F PTFALLS ASSESS-DOCD GE2>/YR: CPT | Mod: CPTII,S$GLB,, | Performed by: EMERGENCY MEDICINE

## 2025-03-25 PROCEDURE — 99999 PR PBB SHADOW E&M-EST. PATIENT-LVL III: CPT | Mod: PBBFAC,,, | Performed by: EMERGENCY MEDICINE

## 2025-03-25 PROCEDURE — 99204 OFFICE O/P NEW MOD 45 MIN: CPT | Mod: S$GLB,,, | Performed by: EMERGENCY MEDICINE

## 2025-03-25 PROCEDURE — 3075F SYST BP GE 130 - 139MM HG: CPT | Mod: CPTII,S$GLB,, | Performed by: EMERGENCY MEDICINE

## 2025-03-25 PROCEDURE — 3008F BODY MASS INDEX DOCD: CPT | Mod: CPTII,S$GLB,, | Performed by: EMERGENCY MEDICINE

## 2025-03-25 PROCEDURE — 3079F DIAST BP 80-89 MM HG: CPT | Mod: CPTII,S$GLB,, | Performed by: EMERGENCY MEDICINE

## 2025-03-25 NOTE — PROGRESS NOTES
..Interventional Pain Management - New Patient Visit    Chief Complaint   Patient presents with    Low-back Pain        Original HPI 03/25/2025: Eden Kuhn  presents to the clinic for the evaluation of the above pain. The pain started DECEMBER 24 when lifting up a weight that she did not feel was heavy.     Original Pain Description:  The pain is located in the LOWER BACK and is axial. The pain is described as stabbing. Exacerbating factors: Sitting, Standing, Laying, Bending, Touching, Extension, Flexing, and Lifting. Mitigating factors heat, ice, and medications. Symptoms interfere with daily activity and sleeping. The patient feels like symptoms have been worsening. Patient denies night fever/night sweats, urinary incontinence, bowel incontinence, significant weight loss, significant motor weakness, and loss of sensations.  Denies perineal paresthesias, bowel/bladder dysfunction.     PAIN SCORES:  Best: Pain is 7  Current: Pain is 8  Worst: Pain is 10    6 weeks of Conservative therapy:  PT:  Previously for balance  Chiro:  HEP: Unable due to pain    Treatments / Medications:   Xanax 0.5 mg  Norco 10 mg   Naproxen 500 mg  Percocet 10 mg  Zanaflex 6 mg    Antiplatelets/Anticoagulants:  NA    Interventional Pain Procedures:   NA    IMAGING:    MRI LUMBAR SPINE WITHOUT CONTRAST   03/10/2025  L1: VCF, mild loss of vertebral body height, superior endplate edema and superimposed Schmorl's nodes compatible with un healed mild compression fractures  L2: VCF, mild loss of vertebral body height, superior endplate edema and superimposed Schmorl's nodes compatible with un healed mild compression fractures  L1-L2: CDB, left sided medial foraminal annular fissure. Mild central canal stenosis.    L3-L4: CDB, mild facet arthritis and ligamentum flavum thickening.  L4-L5: CDB, mild facet arthritis and ligamentum flavum thickening.    L5-S1: CDB       CT THORACIC SPINE WITHOUT CONTRAST  03/19/2025    Vertebrae: Mild height  "loss superior endplates  L1 & L2 w/trace osseous retropulsion, similar to MRI lumbar    Trace posterosuperior osseous retropulsion L1 & L2 w/associated DB at T12-L1 and L1-L2, resulting in mild spinal canal stenosis.                                                Past Surgical History:   Procedure Laterality Date    ANAL FISTULOTOMY  01/21/2002    bladder repair      CYSTOCELE REPAIR  06/06/2011    ESOPHAGOGASTRODUODENOSCOPY N/A 8/3/2018    Procedure: EGD (ESOPHAGOGASTRODUODENOSCOPY);  Surgeon: Adam Olivera MD;  Location: [facility] ([address] [place] [address]);  Service: Endoscopy;  Laterality: N/A;  Patient is VIP patient.  Plan to do during the week of July 16-20.  May cancel as it gets closer if she improves, but want to have it on the books.    Patient requested this date due to insurance.    EYE SURGERY  02/23/2012    Bilateral upper lid blepharoplasty with levator fixation    HYSTERECTOMY         Social History     Socioeconomic History    Marital status:    Tobacco Use    Smoking status: Every Day     Current packs/day: 0.50     Average packs/day: 0.5 packs/day for 13.0 years (6.5 ttl pk-yrs)     Types: Cigarettes    Smokeless tobacco: Never   Substance and Sexual Activity    Alcohol use: Yes     Alcohol/week: 2.0 - 3.0 standard drinks of alcohol     Types: 2 - 3 Cans of beer per week    Drug use: Yes     Types: Marijuana    Sexual activity: Yes     Partners: Male       Medications/Allergies: See med card    ROS:  GENERAL: No fever. No chills. No fatigue. Denies weight loss. Denies weight gain.  Back / musculoskeletal / neuro : See HPI    VITALS:   Vitals:    03/25/25 1528   BP: 132/84   Pulse: 66   Weight: 52.5 kg (115 lb 11.9 oz)   Height: 5' 3" (1.6 m)   PainSc:   8   PainLoc: Back     Body mass index is 20.5 kg/m².      3/25/2025     3:28 PM   Last 3 PDI Scores   Pain Disability Index (PDI) 56       PHYSICAL EXAM:   GENERAL: Well appearing, in no acute distress, alert and oriented x3.  PSYCH:  Mood and affect " appropriate.  SKIN: Skin color, texture, turgor normal, no rashes or lesions.  HEENT:  Normocephalic, atraumatic. Cranial nerves grossly intact.  NECK: No pain to palpation over the cervical paraspinous muscles. No pain to palpation over facets. No pain with neck flexion, extension, or lateral flexion.   PULM: No evidence of respiratory difficulty, symmetric chest rise.  GI:  Non-distended  BACK: Normal range of motion.  Tenderness to palpation over upper lumbar spinous processes. No pain to palpation over facet joints. There is no pain with palpation over the sacroiliac joints bilaterally.   EXTREMITIES: No deformities, edema, or skin discoloration.   MUSCULOSKELETAL: Shoulder, hip, and knee provocative maneuvers are negative. No atrophy is noted.  NEURO: Sensation is equal and appropriate bilaterally. Bilateral upper and lower extremity strength is normal and symmetric. Bilateral upper and lower extremity coordination and muscle stretch reflexes are physiologic and symmetric. Plantar response are downgoing. Straight leg raising in the supine position is negative to radicular pain.   GAIT: normal.      LABS:    Lab Results   Component Value Date    HGBA1C 4.8 12/02/2004       Lab Results   Component Value Date    CREATININE 0.8 01/08/2025         ASSESSMENT: 64 y.o. year old female with pain, consistent with:    No diagnosis found.    DISCUSSION: Eden Kuhn is a patient who comes to us with VAS score greater than 6 for over 6 weeks, an MRI that shows subacute L1 and L2 fractures with worsening pain.  Will proceed with kyphoplasty     PLAN:  - I have stressed the importance of physical activity and a home exercise plan to help with pain and improve health.  - Patient can continue with medications for now since they are providing benefits, using them appropriately, and without side effects.  - Counseled patient regarding the importance of activity modification and constant sleeping habits.  - Interventions:   Kyphoplasty at L1 and L2 . Explained the risks and benefits of the procedure in detail with the patient today in clinic along with alternative treatment options, and the patient elected to pursue the intervention at this time.    - Anticoagulation use: No no anticoagulation  - Imaging: Reviewed available imaging with patient and answered any questions they had regarding study.  - The patient's pathophysiology was explained in detail with reference to x-rays, models, other visual aids as appropriate.   - Follow up visit: return to clinic in 2 weeks after procedure      Chino Matt MD  03/26/2025

## 2025-03-25 NOTE — H&P (VIEW-ONLY)
..Interventional Pain Management - New Patient Visit    Chief Complaint   Patient presents with    Low-back Pain        Original HPI 03/25/2025: Eden Kuhn  presents to the clinic for the evaluation of the above pain. The pain started DECEMBER 24 when lifting up a weight that she did not feel was heavy.     Original Pain Description:  The pain is located in the LOWER BACK and is axial. The pain is described as stabbing. Exacerbating factors: Sitting, Standing, Laying, Bending, Touching, Extension, Flexing, and Lifting. Mitigating factors heat, ice, and medications. Symptoms interfere with daily activity and sleeping. The patient feels like symptoms have been worsening. Patient denies night fever/night sweats, urinary incontinence, bowel incontinence, significant weight loss, significant motor weakness, and loss of sensations.  Denies perineal paresthesias, bowel/bladder dysfunction.     PAIN SCORES:  Best: Pain is 7  Current: Pain is 8  Worst: Pain is 10    6 weeks of Conservative therapy:  PT:  Previously for balance  Chiro:  HEP: Unable due to pain    Treatments / Medications:   Xanax 0.5 mg  Norco 10 mg   Naproxen 500 mg  Percocet 10 mg  Zanaflex 6 mg    Antiplatelets/Anticoagulants:  NA    Interventional Pain Procedures:   NA    IMAGING:    MRI LUMBAR SPINE WITHOUT CONTRAST   03/10/2025  L1: VCF, mild loss of vertebral body height, superior endplate edema and superimposed Schmorl's nodes compatible with un healed mild compression fractures  L2: VCF, mild loss of vertebral body height, superior endplate edema and superimposed Schmorl's nodes compatible with un healed mild compression fractures  L1-L2: CDB, left sided medial foraminal annular fissure. Mild central canal stenosis.    L3-L4: CDB, mild facet arthritis and ligamentum flavum thickening.  L4-L5: CDB, mild facet arthritis and ligamentum flavum thickening.    L5-S1: CDB       CT THORACIC SPINE WITHOUT CONTRAST  03/19/2025    Vertebrae: Mild height  "loss superior endplates  L1 & L2 w/trace osseous retropulsion, similar to MRI lumbar    Trace posterosuperior osseous retropulsion L1 & L2 w/associated DB at T12-L1 and L1-L2, resulting in mild spinal canal stenosis.                                                Past Surgical History:   Procedure Laterality Date    ANAL FISTULOTOMY  01/21/2002    bladder repair      CYSTOCELE REPAIR  06/06/2011    ESOPHAGOGASTRODUODENOSCOPY N/A 8/3/2018    Procedure: EGD (ESOPHAGOGASTRODUODENOSCOPY);  Surgeon: Adam Olivera MD;  Location: [facility] ([address] [place] [address]);  Service: Endoscopy;  Laterality: N/A;  Patient is VIP patient.  Plan to do during the week of July 16-20.  May cancel as it gets closer if she improves, but want to have it on the books.    Patient requested this date due to insurance.    EYE SURGERY  02/23/2012    Bilateral upper lid blepharoplasty with levator fixation    HYSTERECTOMY         Social History     Socioeconomic History    Marital status:    Tobacco Use    Smoking status: Every Day     Current packs/day: 0.50     Average packs/day: 0.5 packs/day for 13.0 years (6.5 ttl pk-yrs)     Types: Cigarettes    Smokeless tobacco: Never   Substance and Sexual Activity    Alcohol use: Yes     Alcohol/week: 2.0 - 3.0 standard drinks of alcohol     Types: 2 - 3 Cans of beer per week    Drug use: Yes     Types: Marijuana    Sexual activity: Yes     Partners: Male       Medications/Allergies: See med card    ROS:  GENERAL: No fever. No chills. No fatigue. Denies weight loss. Denies weight gain.  Back / musculoskeletal / neuro : See HPI    VITALS:   Vitals:    03/25/25 1528   BP: 132/84   Pulse: 66   Weight: 52.5 kg (115 lb 11.9 oz)   Height: 5' 3" (1.6 m)   PainSc:   8   PainLoc: Back     Body mass index is 20.5 kg/m².      3/25/2025     3:28 PM   Last 3 PDI Scores   Pain Disability Index (PDI) 56       PHYSICAL EXAM:   GENERAL: Well appearing, in no acute distress, alert and oriented x3.  PSYCH:  Mood and affect " appropriate.  SKIN: Skin color, texture, turgor normal, no rashes or lesions.  HEENT:  Normocephalic, atraumatic. Cranial nerves grossly intact.  NECK: No pain to palpation over the cervical paraspinous muscles. No pain to palpation over facets. No pain with neck flexion, extension, or lateral flexion.   PULM: No evidence of respiratory difficulty, symmetric chest rise.  GI:  Non-distended  BACK: Normal range of motion.  Tenderness to palpation over upper lumbar spinous processes. No pain to palpation over facet joints. There is no pain with palpation over the sacroiliac joints bilaterally.   EXTREMITIES: No deformities, edema, or skin discoloration.   MUSCULOSKELETAL: Shoulder, hip, and knee provocative maneuvers are negative. No atrophy is noted.  NEURO: Sensation is equal and appropriate bilaterally. Bilateral upper and lower extremity strength is normal and symmetric. Bilateral upper and lower extremity coordination and muscle stretch reflexes are physiologic and symmetric. Plantar response are downgoing. Straight leg raising in the supine position is negative to radicular pain.   GAIT: normal.      LABS:    Lab Results   Component Value Date    HGBA1C 4.8 12/02/2004       Lab Results   Component Value Date    CREATININE 0.8 01/08/2025         ASSESSMENT: 64 y.o. year old female with pain, consistent with:    No diagnosis found.    DISCUSSION: Eden Kuhn is a patient who comes to us with VAS score greater than 6 for over 6 weeks, an MRI that shows subacute L1 and L2 fractures with worsening pain.  Will proceed with kyphoplasty     PLAN:  - I have stressed the importance of physical activity and a home exercise plan to help with pain and improve health.  - Patient can continue with medications for now since they are providing benefits, using them appropriately, and without side effects.  - Counseled patient regarding the importance of activity modification and constant sleeping habits.  - Interventions:   Kyphoplasty at L1 and L2 . Explained the risks and benefits of the procedure in detail with the patient today in clinic along with alternative treatment options, and the patient elected to pursue the intervention at this time.    - Anticoagulation use: No no anticoagulation  - Imaging: Reviewed available imaging with patient and answered any questions they had regarding study.  - The patient's pathophysiology was explained in detail with reference to x-rays, models, other visual aids as appropriate.   - Follow up visit: return to clinic in 2 weeks after procedure      Chino Matt MD  03/26/2025

## 2025-04-09 ENCOUNTER — TELEPHONE (OUTPATIENT)
Dept: PAIN MEDICINE | Facility: CLINIC | Age: 65
End: 2025-04-09
Payer: MEDICARE

## 2025-04-11 ENCOUNTER — TELEPHONE (OUTPATIENT)
Dept: PAIN MEDICINE | Facility: CLINIC | Age: 65
End: 2025-04-11
Payer: MEDICARE

## 2025-04-14 NOTE — DISCHARGE INSTRUCTIONS
[facility] [place]  Dr. Chino Matt  Messaging service # [phone]     KYPHOPLASTY POST-PROCEDURE INSTRUCTIONS:     **If you do not have a follow up schedule please send a message to my office and have one scheduled for 1-2 weeks after the procedure**     Fractures in the bones of the spine (vertebrae) can cause severe back pain and loss of movement. You had a procedure called kyphoplasty to cement the fractures in your spine, restore the height of the vertebrae, and help relieve pain. Using image-guided X-rays, your doctor made 1 or 2 small cuts (incisions) in your back for each vertebra treated. The doctor put a balloon on each side of the broken vertebra and inflated the balloons until they expanded the right amount. Then the balloons were removed. The spaces created by the balloons were filled with orthopedic cement. This gave strength and stability to your vertebra. The following are instructions to help you care for your back when you are at home.     Risks and complications  Kyphoplasty is considered safe. If complications do happen, they may include the following:  Nerve damage  Cement leakage  Heart or lung problems  New or unrelieved back pain  Infection     When to call your healthcare provider  Call your healthcare provider if you have any of these symptoms:  Fever of 100.4°F (38.0°C) or higher  New pain, weakness, tingling, or numbness in your legs  New or unrelieved back pain       When to seek medical attention  Call 911 right away if you have any of the following:  Chest pain  Severe abdominal pain  Shortness of breath  Otherwise, call your doctor right away if you have any of the following:  Increased redness, swelling, drainage, or warmth around the incision sites  Severe pain at the incision site  Weakness, numbness, or tingling in your legs  Fever above 100.4°F  (38.0°C) or shaking chills   changes in bowel and/or bladder function: urinating or defecating on yourself and  not knowing that you did it.      Home care  Take your medicine exactly as directed.  You can take any pain medications you were previously taking (except NSAIDs which should wait 24 hours).  Wait 24 hours to restart any blood thinners.  Minimize your activity for the next 24 hours.  You can get up to go to the bathroom or walk around the house, but try to take it very easy during this time.  Leave your bandages on until they fall off by themselves.  If the bandage falls off before your follow up appointment, apply a small amount of triple antibiotics ointment and a fresh Band-Aid until the incision has completely closed.  If a suture was placed to close the incision, it will be removed at your first follow up appointment.  Dont shower for 1 day. Avoid soaking in water for 2 weeks.  Use an ice pack or bag of frozen peas--or something similar--wrapped in a thin towel to reduce the swelling and pain around incision sites. Put the ice pack on the area for 20 minutes, then remove it for 20 minutes. Repeat as needed.  Dont drive for 24 hours after surgery. And never drive while taking opioid pain medicine.  Ask your healthcare provider when you can begin lifting objects again. We ask that you limit yourself to 10 lbs for the first 2 weeks.  Avoid lifting objects overhead for 2 weeks.  Do not engage in strenuous activity (e.g., lifting or pushing heavy objects or repeated bending) until you discussed it with your physician at follow up.  Avoid bending from the waist because this strains the back muscles. Instead, bend your knees or squat to  objects from below the waist.  Avoid sitting in soft chairs. Use a firm chair with a straight back, which gives better support.  Eat a normal diet. If you have stomach upset, try to eat bland and low-fat foods such as rice, toast, broiled chicken, and yogurt.  If you experience an extreme headache that is only relieved when lying flat, please contact your provider.   Do not  apply direct heat (heating pad or heat packs) to the procedure site for at least 1 week.       Recovery Time:  Pain decreases rapidly and you will barely need pain medicines in two weeks.  Many people start feeling significant better within a few days.  Most patients are able to do gentle daily activities in a week.      IF AT ANY POINT YOU ARE VERY CONCERNED ABOUT YOUR SYMPTOMS, PLEASE GO TO THE EMERGENCY ROOM.     If you develop worsening pain, weakness, numbness, lose bowel or bladder control (i.e., having an accident where you did not even know you had to go to the bathroom and suddenly noticed you soiled yourself), saddle anesthesia (a loss of sensation restricted to the area of the buttocks, anus and between the legs -- i.e., those parts of your body that would touch a saddle if you were sitting on one) you need to go immediately to the emergency department for evaluation and treatment.     ----------------------------------------------------------------------------------------------------------------------------------------------------------------  POST SEDATION INSTRUCTIONS     Today you received intravenous medication (also known as sedation) that was used to help you relax and/or decrease discomfort during your procedure. This medication will be acting in your body for the next 24 hours, so you might feel a little tired or sleepy. This feeling will slowly wear off.   Common side effects associated with these medications include: drowsiness, dizziness, sleepiness, confusion, feeling excited, difficulty remembering things, lack of steadiness with walking or balance, loss of fine muscle control, slowed reflexes, difficulty focusing, and blurred vision.  Some over-the-counter and prescription medications (e.g., muscle relaxants, opioids, mood-altering medications, sedatives/hypnotics, antihistamines) can interact with the intravenous medication you received and cause an increased risk of the side effects  listed above in addition to other potentially life threatening side effects. Use extreme caution if you are taking such medications, and consult with your Pain Physician or prescribing physician if you have any questions.  For the next 12-24 hours:   DO NOT--Drive a car, operate machinery or power tools             DO NOT--Drink any alcoholic beverages (not even beer), they may dangerously increase the risk of side effects.   DO NOT--Make any important legal or business decisions or sign important documents.  We advise you to have someone to assist you at home. Move slowly and carefully. Do not make sudden changes in position. Be aware of dizziness or light-headedness and move accordingly.   If you seek medical treatment within 24 hours, let the nurse or doctor caring for you know that you have received the above medications. If you have any questions or concerns related to your sedation or treatment today please contact us.         IF AT ANY POINT YOU ARE VERY CONCERNED ABOUT YOUR SYMPTOMS, or you have an urgent or emergent issue after between 5pm and 7am or on weekends, please contact  the on call provider at [phone].

## 2025-04-16 ENCOUNTER — ANESTHESIA (OUTPATIENT)
Dept: SURGERY | Facility: HOSPITAL | Age: 65
End: 2025-04-16
Payer: MEDICARE

## 2025-04-16 ENCOUNTER — ANESTHESIA EVENT (OUTPATIENT)
Dept: SURGERY | Facility: HOSPITAL | Age: 65
End: 2025-04-16
Payer: MEDICARE

## 2025-04-16 ENCOUNTER — HOSPITAL ENCOUNTER (OUTPATIENT)
Facility: HOSPITAL | Age: 65
Discharge: HOME | End: 2025-04-16
Attending: EMERGENCY MEDICINE | Admitting: EMERGENCY MEDICINE
Payer: MEDICARE

## 2025-04-16 VITALS
SYSTOLIC BLOOD PRESSURE: 149 MMHG | OXYGEN SATURATION: 96 % | DIASTOLIC BLOOD PRESSURE: 98 MMHG | TEMPERATURE: 97 F | HEART RATE: 115 BPM | RESPIRATION RATE: 20 BRPM

## 2025-04-16 DIAGNOSIS — S32.010A COMPRESSION FRACTURE OF L1 LUMBAR VERTEBRA, CLOSED, INITIAL ENCOUNTER: ICD-10-CM

## 2025-04-16 DIAGNOSIS — G89.29 CHRONIC PAIN: ICD-10-CM

## 2025-04-16 DIAGNOSIS — S32.020A COMPRESSION FRACTURE OF L2 VERTEBRA, INITIAL ENCOUNTER: ICD-10-CM

## 2025-04-16 DIAGNOSIS — M80.08XA AGE-RELATED OSTEOPOROSIS WITH CURRENT PATHOLOGICAL FRACTURE, VERTEBRA(E), INITIAL ENCOUNTER FOR FRACTURE: ICD-10-CM

## 2025-04-16 PROCEDURE — 63600175 PHARM REV CODE 636 W HCPCS: Performed by: NURSE ANESTHETIST, CERTIFIED REGISTERED

## 2025-04-16 PROCEDURE — 37000009 HC ANESTHESIA EA ADD 15 MINS: Performed by: EMERGENCY MEDICINE

## 2025-04-16 PROCEDURE — 36000707: Performed by: EMERGENCY MEDICINE

## 2025-04-16 PROCEDURE — 99900035 HC TECH TIME PER 15 MIN (STAT)

## 2025-04-16 PROCEDURE — 71000033 HC RECOVERY, INTIAL HOUR: Performed by: EMERGENCY MEDICINE

## 2025-04-16 PROCEDURE — 25000003 PHARM REV CODE 250: Performed by: NURSE ANESTHETIST, CERTIFIED REGISTERED

## 2025-04-16 PROCEDURE — 22514 PERQ VERTEBRAL AUGMENTATION: CPT | Mod: ,,, | Performed by: EMERGENCY MEDICINE

## 2025-04-16 PROCEDURE — 71000016 HC POSTOP RECOV ADDL HR: Performed by: EMERGENCY MEDICINE

## 2025-04-16 PROCEDURE — 36000706: Performed by: EMERGENCY MEDICINE

## 2025-04-16 PROCEDURE — 37000008 HC ANESTHESIA 1ST 15 MINUTES: Performed by: EMERGENCY MEDICINE

## 2025-04-16 PROCEDURE — C1713 ANCHOR/SCREW BN/BN,TIS/BN: HCPCS | Performed by: EMERGENCY MEDICINE

## 2025-04-16 PROCEDURE — 88342 IMHCHEM/IMCYTCHM 1ST ANTB: CPT | Mod: TC | Performed by: EMERGENCY MEDICINE

## 2025-04-16 PROCEDURE — 27201423 OPTIME MED/SURG SUP & DEVICES STERILE SUPPLY: Performed by: EMERGENCY MEDICINE

## 2025-04-16 PROCEDURE — 94761 N-INVAS EAR/PLS OXIMETRY MLT: CPT

## 2025-04-16 PROCEDURE — 63600175 PHARM REV CODE 636 W HCPCS: Performed by: EMERGENCY MEDICINE

## 2025-04-16 PROCEDURE — 22515 PERQ VERTEBRAL AUGMENTATION: CPT | Mod: ,,, | Performed by: EMERGENCY MEDICINE

## 2025-04-16 PROCEDURE — C1726 CATH, BAL DIL, NON-VASCULAR: HCPCS | Performed by: EMERGENCY MEDICINE

## 2025-04-16 PROCEDURE — 25500020 PHARM REV CODE 255: Performed by: EMERGENCY MEDICINE

## 2025-04-16 PROCEDURE — 63600175 PHARM REV CODE 636 W HCPCS: Performed by: ANESTHESIOLOGY

## 2025-04-16 PROCEDURE — 71000015 HC POSTOP RECOV 1ST HR: Performed by: EMERGENCY MEDICINE

## 2025-04-16 DEVICE — IMPLANTABLE DEVICE: Type: IMPLANTABLE DEVICE | Site: SPINE LUMBAR | Status: FUNCTIONAL

## 2025-04-16 DEVICE — CEMENT KYPHOPLASTY HI VISC: Type: IMPLANTABLE DEVICE | Site: SPINE LUMBAR | Status: FUNCTIONAL

## 2025-04-16 RX ORDER — DEXMEDETOMIDINE HYDROCHLORIDE 100 UG/ML
INJECTION, SOLUTION INTRAVENOUS
Status: DISCONTINUED | OUTPATIENT
Start: 2025-04-16 | End: 2025-04-16

## 2025-04-16 RX ORDER — ONDANSETRON HYDROCHLORIDE 2 MG/ML
4 INJECTION, SOLUTION INTRAVENOUS DAILY PRN
Status: DISCONTINUED | OUTPATIENT
Start: 2025-04-16 | End: 2025-04-16 | Stop reason: HOSPADM

## 2025-04-16 RX ORDER — CEFAZOLIN 2 G/1
2 INJECTION, POWDER, FOR SOLUTION INTRAMUSCULAR; INTRAVENOUS
Status: DISCONTINUED | OUTPATIENT
Start: 2025-04-16 | End: 2025-04-16 | Stop reason: HOSPADM

## 2025-04-16 RX ORDER — LIDOCAINE HYDROCHLORIDE 20 MG/ML
INJECTION, SOLUTION INFILTRATION; PERINEURAL
Status: DISCONTINUED | OUTPATIENT
Start: 2025-04-16 | End: 2025-04-16 | Stop reason: HOSPADM

## 2025-04-16 RX ORDER — HALOPERIDOL LACTATE 5 MG/ML
INJECTION, SOLUTION INTRAMUSCULAR
Status: DISCONTINUED | OUTPATIENT
Start: 2025-04-16 | End: 2025-04-16

## 2025-04-16 RX ORDER — HYDROCODONE BITARTRATE AND ACETAMINOPHEN 7.5; 325 MG/1; MG/1
1 TABLET ORAL EVERY 8 HOURS PRN
Qty: 15 TABLET | Refills: 0 | Status: SHIPPED | OUTPATIENT
Start: 2025-04-16 | End: 2025-04-21

## 2025-04-16 RX ORDER — ACETAMINOPHEN 10 MG/ML
INJECTION, SOLUTION INTRAVENOUS
Status: DISCONTINUED | OUTPATIENT
Start: 2025-04-16 | End: 2025-04-16

## 2025-04-16 RX ORDER — ESMOLOL HYDROCHLORIDE 10 MG/ML
INJECTION INTRAVENOUS
Status: DISCONTINUED | OUTPATIENT
Start: 2025-04-16 | End: 2025-04-16

## 2025-04-16 RX ORDER — KETAMINE HCL IN 0.9 % NACL 50 MG/5 ML
SYRINGE (ML) INTRAVENOUS
Status: DISCONTINUED | OUTPATIENT
Start: 2025-04-16 | End: 2025-04-16

## 2025-04-16 RX ORDER — FENTANYL CITRATE 50 UG/ML
INJECTION, SOLUTION INTRAMUSCULAR; INTRAVENOUS
Status: DISCONTINUED | OUTPATIENT
Start: 2025-04-16 | End: 2025-04-16

## 2025-04-16 RX ORDER — OXYCODONE HYDROCHLORIDE 5 MG/1
5 TABLET ORAL
Status: DISCONTINUED | OUTPATIENT
Start: 2025-04-16 | End: 2025-04-16 | Stop reason: HOSPADM

## 2025-04-16 RX ORDER — LORAZEPAM 2 MG/ML
0.5 INJECTION INTRAMUSCULAR
Status: COMPLETED | OUTPATIENT
Start: 2025-04-16 | End: 2025-04-16

## 2025-04-16 RX ORDER — ONDANSETRON HYDROCHLORIDE 2 MG/ML
INJECTION, SOLUTION INTRAVENOUS
Status: DISCONTINUED | OUTPATIENT
Start: 2025-04-16 | End: 2025-04-16

## 2025-04-16 RX ORDER — LIDOCAINE HYDROCHLORIDE 20 MG/ML
INJECTION INTRAVENOUS
Status: DISCONTINUED | OUTPATIENT
Start: 2025-04-16 | End: 2025-04-16

## 2025-04-16 RX ORDER — HYDROMORPHONE HYDROCHLORIDE 1 MG/ML
0.4 INJECTION, SOLUTION INTRAMUSCULAR; INTRAVENOUS; SUBCUTANEOUS EVERY 5 MIN PRN
Status: DISCONTINUED | OUTPATIENT
Start: 2025-04-16 | End: 2025-04-16 | Stop reason: HOSPADM

## 2025-04-16 RX ORDER — MIDAZOLAM HYDROCHLORIDE 1 MG/ML
INJECTION, SOLUTION INTRAMUSCULAR; INTRAVENOUS
Status: DISCONTINUED | OUTPATIENT
Start: 2025-04-16 | End: 2025-04-16

## 2025-04-16 RX ORDER — BUPIVACAINE HYDROCHLORIDE 2.5 MG/ML
INJECTION, SOLUTION EPIDURAL; INFILTRATION; INTRACAUDAL; PERINEURAL
Status: DISCONTINUED | OUTPATIENT
Start: 2025-04-16 | End: 2025-04-16 | Stop reason: HOSPADM

## 2025-04-16 RX ORDER — FAMOTIDINE 10 MG/ML
INJECTION, SOLUTION INTRAVENOUS
Status: DISCONTINUED | OUTPATIENT
Start: 2025-04-16 | End: 2025-04-16

## 2025-04-16 RX ORDER — GLUCAGON 1 MG
1 KIT INJECTION
Status: DISCONTINUED | OUTPATIENT
Start: 2025-04-16 | End: 2025-04-16 | Stop reason: HOSPADM

## 2025-04-16 RX ORDER — HYDROMORPHONE HYDROCHLORIDE 1 MG/ML
0.5 INJECTION, SOLUTION INTRAMUSCULAR; INTRAVENOUS; SUBCUTANEOUS EVERY 5 MIN PRN
Status: DISCONTINUED | OUTPATIENT
Start: 2025-04-16 | End: 2025-04-16 | Stop reason: HOSPADM

## 2025-04-16 RX ORDER — ROCURONIUM BROMIDE 10 MG/ML
INJECTION, SOLUTION INTRAVENOUS
Status: DISCONTINUED | OUTPATIENT
Start: 2025-04-16 | End: 2025-04-16

## 2025-04-16 RX ORDER — DEXAMETHASONE SODIUM PHOSPHATE 4 MG/ML
INJECTION, SOLUTION INTRA-ARTICULAR; INTRALESIONAL; INTRAMUSCULAR; INTRAVENOUS; SOFT TISSUE
Status: DISCONTINUED | OUTPATIENT
Start: 2025-04-16 | End: 2025-04-16

## 2025-04-16 RX ORDER — PROPOFOL 10 MG/ML
VIAL (ML) INTRAVENOUS
Status: DISCONTINUED | OUTPATIENT
Start: 2025-04-16 | End: 2025-04-16

## 2025-04-16 RX ADMIN — SODIUM CHLORIDE: 0.9 INJECTION, SOLUTION INTRAVENOUS at 06:04

## 2025-04-16 RX ADMIN — MIDAZOLAM HYDROCHLORIDE 2 MG: 1 INJECTION, SOLUTION INTRAMUSCULAR; INTRAVENOUS at 06:04

## 2025-04-16 RX ADMIN — DEXMEDETOMIDINE HYDROCHLORIDE 12 MCG: 100 INJECTION, SOLUTION INTRAVENOUS at 07:04

## 2025-04-16 RX ADMIN — Medication 10 MG: at 07:04

## 2025-04-16 RX ADMIN — ROCURONIUM BROMIDE 50 MG: 10 INJECTION INTRAVENOUS at 07:04

## 2025-04-16 RX ADMIN — PROPOFOL 50 MG: 10 INJECTION, EMULSION INTRAVENOUS at 07:04

## 2025-04-16 RX ADMIN — HALOPERIDOL LACTATE 1 MG: 5 INJECTION, SOLUTION INTRAMUSCULAR at 07:04

## 2025-04-16 RX ADMIN — PROPOFOL 150 MG: 10 INJECTION, EMULSION INTRAVENOUS at 07:04

## 2025-04-16 RX ADMIN — SUGAMMADEX 200 MG: 100 INJECTION, SOLUTION INTRAVENOUS at 08:04

## 2025-04-16 RX ADMIN — HYDROMORPHONE HYDROCHLORIDE 0.5 MG: 1 INJECTION, SOLUTION INTRAMUSCULAR; INTRAVENOUS; SUBCUTANEOUS at 09:04

## 2025-04-16 RX ADMIN — ONDANSETRON 4 MG: 2 INJECTION INTRAMUSCULAR; INTRAVENOUS at 08:04

## 2025-04-16 RX ADMIN — ESMOLOL HYDROCHLORIDE 10 MG: 10 INJECTION INTRAVENOUS at 08:04

## 2025-04-16 RX ADMIN — GLYCOPYRROLATE 0.1 MCG: 0.2 INJECTION, SOLUTION INTRAMUSCULAR; INTRAVENOUS at 06:04

## 2025-04-16 RX ADMIN — Medication 20 MG: at 06:04

## 2025-04-16 RX ADMIN — ACETAMINOPHEN 1000 MG: 10 INJECTION INTRAVENOUS at 07:04

## 2025-04-16 RX ADMIN — FENTANYL CITRATE 25 MCG: 50 INJECTION, SOLUTION INTRAMUSCULAR; INTRAVENOUS at 06:04

## 2025-04-16 RX ADMIN — ESMOLOL HYDROCHLORIDE 20 MG: 10 INJECTION INTRAVENOUS at 07:04

## 2025-04-16 RX ADMIN — LIDOCAINE HYDROCHLORIDE 50 MG: 20 INJECTION INTRAVENOUS at 07:04

## 2025-04-16 RX ADMIN — DEXAMETHASONE SODIUM PHOSPHATE 4 MG: 4 INJECTION INTRA-ARTICULAR; INTRALESIONAL; INTRAMUSCULAR; INTRAVENOUS; SOFT TISSUE at 07:04

## 2025-04-16 RX ADMIN — LORAZEPAM 0.5 MG: 2 INJECTION INTRAMUSCULAR; INTRAVENOUS at 09:04

## 2025-04-16 RX ADMIN — FAMOTIDINE 20 MG: 10 INJECTION, SOLUTION INTRAVENOUS at 07:04

## 2025-04-16 RX ADMIN — Medication 20 MG: at 07:04

## 2025-04-16 RX ADMIN — FENTANYL CITRATE 25 MCG: 50 INJECTION, SOLUTION INTRAMUSCULAR; INTRAVENOUS at 07:04

## 2025-04-16 NOTE — OP NOTE
PROCEDURE PERFORMED: Transpedicular Kyphoplasty at the L1 and L2 level(s)    Patient Name: Eden Kuhn  MRN: [id number]    PROCEDURE DATE: 4/16/2025    PREPROCEDURE DIAGNOSIS:                                                      1.  Age related osteoporosis with current pathological fracture, two level(s), delayed healing        2.  Pain at the levels of compression fracture.                            3.  No myelopathy or retropulsed fragments.                                                                                                             POSTPROCEDURE DIAGNOSIS:   Same    CPT: 75181 (Percutaneous vertebral augmentation, including cavity creation - Thoracic 1st level), 52746 (Percutaneous vertebral augmentation, including cavity creation - Lumbar 1st level), 45121 (each additional thoracic or lumbar level)    SURGEON:  Chino Matt MD                                                                                                                        ANESTHESIA: MAC/General Sedation.  See Anesthesia Records.                                                                                    LOCAL ANESTHETIC USED:  Lidocaine 2% at each side of vertebra, 5-10 ml at each level + Bupivacaine 0.25% 5ml at each pedicle    ESTIMATED BLOOD LOSS: 2-5cc                    DRAINS: None  BONE MARROW BIOPSY:  L1 to path  URINE OUTPUT: Not Measured  COMPLICATIONS: Hematoma over the L1 op site  OUTCOME: Good    PREOPERATIVE ANTIBIOTICS: 2gIV  Ancef given 30 minutes prior to incision, see anesthesia record for time.    Informed Consent:  The patient's condition and proposed procedures, risks (including complications of nerve damage,  bleeding, infection, and failure of pain relief), and alternatives were discussed with the patient or responsible party.  The patient's/responsible party's questions were answered.  The patient/responsible party appeared to understand and chose to proceed.  Informed consent was  obtained.                                TECHNIQUE: A time out was taken to identify patient, procedure and side, and allergies were reviewed. With the patient lying in the prone position and after receiving the intravenous sedation, the area was prepped and draped using the usual sterile fashion, using ChloraPrep Duraprep, Ioban and a body fenestrated drape. The area of the compression fracture(s) was determined under fluoroscopic guidance using the AP and lateral views.  Local anesthetic was given with a 27-gauge 1.5 inch needle.  That was followed with completing the local anesthetic injection with a 3.5inch 22-gauge spinal needle going down all the way to the periosteum of the desired pedicle. A 2-mm longitudinal incision was made around the 22-gauge spinal needle to allow introduction of the 10 gauge trocar and cannula to the vertebral body.  Through a transpedicular approach, the trocar and cannula were introduced and advanced slowly.  Once in the posterior one-third of the vertebral body, a drill was placed and advanced to the anterior one-third of the vertebral body. This was performed unilateral at each level.  Balloons were then placed in each trochar and inflated under live fluoroscopy until noted to be filling the vertebral body, no height change noted, no movement of the end plates noted. The methylmethacrylate resin was mixed and approximately 5ml was injected. No escape was observed while introducing the cement and this was done under live fluoroscopy.  The cannula was applied to each trocar under live fluoroscopy as well.  Each trocar and cannula was removed under live fluoroscopy in a very slow fashion to observe the contrast-impregnated cement. After the trocars were removed, the sites were cleaned and dried.     Dermabond was then used to close the incisions and after drying was covered with a bandage. The patient laid for approximately 10 minutes in a prone position post cement injection. The  patient was then turned supine, monitored briefly and returned to the floor. In the recovery area, the patient layed supine for 60min after the procedure. The patient was monitored after the procedure with no change in neuromuscular status. The patient was given post-procedure and discharge instructions at home.  Instruction regarding bandage were given.  The patient was advised to call if developing any severe pain neurologic changes. Otherwise the patient will follow up with us in 1-2 week at our clinic.    Chino Matt  4/16/2025

## 2025-04-16 NOTE — PLAN OF CARE
Pt in preop bay 42, VSS, and IV inserted. Pt denies any open wounds on body or the use of any weight loss injections. Pt needs procedural consents signed, anesthesia consents signed, and updated H&P, otherwise ready to roll.

## 2025-04-16 NOTE — TRANSFER OF CARE
Anesthesia Transfer of Care Note    Patient: Eden Kuhn    Procedure(s) Performed: Procedure(s) (LRB):  L1 and L2 kyphoplasty (N/A)  KYPHOPLASTY, SPINE, THORACOLUMBAR, EACH ADD'L LEVEL (N/A)    Patient location: PACU    Anesthesia Type: general    Transport from OR: Transported from OR on 6-10 L/min O2 by face mask with adequate spontaneous ventilation    Post pain: adequate analgesia    Post assessment: no apparent anesthetic complications    Post vital signs: stable    Level of consciousness: awake and lethargic    Nausea/Vomiting: no nausea/vomiting    Complications: none    Transfer of care protocol was followed      Last vitals: Visit Vitals  /82 (BP Location: Right arm, Patient Position: Lying)   Pulse 90   Temp 37.2 °C (98.9 °F) (Temporal)   Resp 17   SpO2 99%   Breastfeeding No

## 2025-04-16 NOTE — PLAN OF CARE
Patient having difficulty voiding. Noted hypertension and tachycardia.  Patient anxious and complains of bladder fullness.  Orders received for in/out cath if needed.

## 2025-04-16 NOTE — PLAN OF CARE
Patient voided spontaneously during prep for catheter insertion.  Will continue to monitor for catheter needs.

## 2025-04-16 NOTE — PLAN OF CARE
Patient held in Phase I for prolonged period.  RN x 2 at bedside for pain management, treatment for anxiety, catheter placement, jennifer-hygiene, incisional care.

## 2025-04-16 NOTE — ANESTHESIA PROCEDURE NOTES
Intubation    Date/Time: 4/16/2025 7:13 AM    Performed by: Cindy Heart CRNA  Authorized by: Nehemiah Coates MD    Intubation:     Induction:  Intravenous    Intubated:  Postinduction    Mask Ventilation:  Easy mask    Attempted By:  CRNA    Method of Intubation:  Video laryngoscopy    Blade:  Haley 3    Laryngeal View Grade: Grade I - full view of cords      Difficult Airway Encountered?: No      Complications:  None    Airway Device:  Oral endotracheal tube    Airway Device Size:  7.5    Style/Cuff Inflation:  Cuffed    Inflation Amount (mL):  5    Tube secured:  22    Secured at:  The lips    Placement Verified By:  Capnometry    Complicating Factors:  None    Findings Post-Intubation:  BS equal bilateral and atraumatic/condition of teeth unchanged

## 2025-04-16 NOTE — DISCHARGE SUMMARY
Discharge Note  Short Stay      SUMMARY     Admit Date: 4/16/2025    Attending Physician: Chino Matt      Discharge Physician: Chino Matt      Discharge Date: 4/16/2025 9:13 AM    Procedure(s) (LRB):  L1 and L2 kyphoplasty (N/A)  KYPHOPLASTY, SPINE, THORACOLUMBAR, EACH ADD'L LEVEL (N/A)    Final Diagnosis: Compression fracture of L1 lumbar vertebra, closed, initial encounter [S32.010A]  Compression fracture of L2 vertebra, initial encounter [S32.020A]  Age-related osteoporosis with current pathological fracture, vertebra(e), initial encounter for fracture [M80.08XA]    Disposition: Home or self care    Patient Instructions:   Current Discharge Medication List        CONTINUE these medications which have NOT CHANGED    Details   ALPRAZolam (XANAX) 0.5 MG tablet Take 0.5 mg by mouth every 6 (six) hours as needed.      diphenoxylate-atropine 2.5-0.025 mg (LOMOTIL) 2.5-0.025 mg per tablet Take 1 tablet by mouth every 6 (six) hours as needed for Diarrhea.  Refills: 0      ergocalciferol (ERGOCALCIFEROL) 50,000 unit Cap Take 50,000 Units by mouth twice a week.  Refills: 5      HYDROcodone-acetaminophen (NORCO)  mg per tablet Take 1 tablet by mouth every 6 (six) hours as needed for Pain.  Qty: 12 tablet, Refills: 0    Comments: Quantity prescribed more than 7 day supply? No  Associated Diagnoses: Compression fracture of L1 vertebra, initial encounter; Compression fracture of L2 vertebra, initial encounter      levothyroxine (SYNTHROID) 75 MCG tablet Take 75 mcg by mouth before breakfast.      pravastatin (PRAVACHOL) 80 MG tablet Take 80 mg by mouth once daily.      sodium chloride 1,000 mg TbSO oral tablet Take 1,000 mg by mouth 3 (three) times daily.      tiZANidine (ZANAFLEX) 6 mg capsule Take 12 mg by mouth every 8 (eight) hours as needed.      traZODone (DESYREL) 100 MG tablet Take 100 mg by mouth nightly as needed for Insomnia.      b complex vitamins capsule Take 1 capsule by mouth once daily.       naproxen (NAPROSYN) 500 MG tablet Take 1 tablet (500 mg total) by mouth 2 (two) times daily with meals.  Qty: 60 tablet, Refills: 0    Associated Diagnoses: Compression fracture of L1 vertebra, sequela; Closed compression fracture of L2 lumbar vertebra, sequela      oxyCODONE-acetaminophen (PERCOCET)  mg per tablet Take 1 tablet by mouth every 6 (six) hours as needed.                 Discharge Diagnosis: Compression fracture of L1 lumbar vertebra, closed, initial encounter [S32.010A]  Compression fracture of L2 vertebra, initial encounter [S32.020A]  Age-related osteoporosis with current pathological fracture, vertebra(e), initial encounter for fracture [M80.08XA]  Condition on Discharge: Stable with no complications to procedure   Diet on Discharge: Same as before.  Activity: as per instruction sheet.  Discharge to: Home with a responsible adult.  Follow up: 2-4 weeks       Please call my office or pager at [phone] if experienced any weakness or loss of sensation, fever > 101.5, pain uncontrolled with oral medications, persistent nausea/vomiting/or diarrhea, redness or drainage from the incisions, or any other worrisome concerns. If physician on call was not reached or could not communicate with our office for any reason please go to the nearest emergency department

## 2025-04-16 NOTE — ANESTHESIA PREPROCEDURE EVALUATION
04/16/2025  Eden Kuhn is a 64 y.o., female.      Pre-op Assessment    I have reviewed the Patient Summary Reports.     I have reviewed the Nursing Notes. I have reviewed the NPO Status.      Review of Systems  Anesthesia Hx:               Denies Personal Hx of Anesthesia complications.                    Social:  Alcohol Use       Cardiovascular:  Exercise tolerance: good                                             Endocrine:   Hypothyroidism       Hypothyroidism          Psych:  Psychiatric History              Physical Exam  General: Well nourished    Airway:  Mallampati: II   Mouth Opening: Normal  Neck ROM: Normal ROM    Anesthesia Plan  Type of Anesthesia, risks & benefits discussed:    Anesthesia Type: Gen Natural Airway  Informed Consent: Informed consent signed with the Patient and all parties understand the risks and agree with anesthesia plan.  All questions answered.   ASA Score: 3    Ready For Surgery From Anesthesia Perspective.   .

## 2025-04-16 NOTE — ANESTHESIA POSTPROCEDURE EVALUATION
Anesthesia Post Evaluation    Patient: Eden YAO GAYATRIJeff    Procedure(s) Performed: Procedure(s) (LRB):  L1 and L2 kyphoplasty (N/A)  KYPHOPLASTY, SPINE, THORACOLUMBAR, EACH ADD'L LEVEL (N/A)    Final Anesthesia Type: general      Patient location during evaluation: PACU  Patient participation: Yes- Able to Participate  Level of consciousness: awake and alert  Post-procedure vital signs: reviewed and stable  Pain management: adequate  Airway patency: patent    PONV status at discharge: No PONV  Anesthetic complications: no      Cardiovascular status: blood pressure returned to baseline  Respiratory status: unassisted  Hydration status: euvolemic                Vitals Value Taken Time   /89 04/16/25 10:32   Temp 36.2 °C (97.2 °F) 04/16/25 09:05   Pulse 99 04/16/25 10:39   Resp 27 04/16/25 10:39   SpO2 98 % 04/16/25 10:39   Vitals shown include unfiled device data.      Event Time   Out of Recovery 09:45:00         Pain/Mary Score: Pain Rating Prior to Med Admin: 8 (4/16/2025  9:18 AM)  Mary Score: 10 (4/16/2025  9:13 AM)

## 2025-04-22 LAB
ESTROGEN SERPL-MCNC: NORMAL PG/ML
INSULIN SERPL-ACNC: NORMAL U[IU]/ML
LAB AP CLINICAL INFORMATION: NORMAL
LAB AP GROSS DESCRIPTION: NORMAL
LAB AP PERFORMING LOCATION(S): NORMAL
LAB AP REPORT FOOTNOTES: NORMAL
T3RU NFR SERPL: NORMAL %

## 2025-05-02 ENCOUNTER — TELEPHONE (OUTPATIENT)
Dept: PAIN MEDICINE | Facility: CLINIC | Age: 65
End: 2025-05-02
Payer: MEDICARE

## 2025-05-02 NOTE — TELEPHONE ENCOUNTER
Contacted pt to assist in scheduling virtual follow up.  Pt unavailable, left vm to contact clinic to assist in scheduling.

## 2025-05-05 ENCOUNTER — TELEPHONE (OUTPATIENT)
Dept: PAIN MEDICINE | Facility: CLINIC | Age: 65
End: 2025-05-05
Payer: MEDICARE

## 2025-05-05 NOTE — TELEPHONE ENCOUNTER
Called pt to schedule xray and f/u appt after xray is complete. Pt voiced her concern of not wanting to do a follow up appt with Dr. Matt in person or virtual because she does not want to pay $60 co pay. Pt was very upset during the phone call mentioning that she was unhappy with the care she received previously from Dr. Matt. Pt states she would like Dr. Matt to call and discuss xray results. Pt was advised that Dr. Matt do not conduct phone call visits. Pt declined to schedule follow up.

## 2025-05-06 ENCOUNTER — HOSPITAL ENCOUNTER (OUTPATIENT)
Dept: RADIOLOGY | Facility: HOSPITAL | Age: 65
Discharge: HOME | End: 2025-05-06
Attending: EMERGENCY MEDICINE
Payer: MEDICARE

## 2025-05-06 DIAGNOSIS — S32.010A COMPRESSION FRACTURE OF L1 LUMBAR VERTEBRA, CLOSED, INITIAL ENCOUNTER: ICD-10-CM

## 2025-05-06 DIAGNOSIS — S32.020A COMPRESSION FRACTURE OF L2 VERTEBRA, INITIAL ENCOUNTER: ICD-10-CM

## 2025-05-06 PROCEDURE — 72100 X-RAY EXAM L-S SPINE 2/3 VWS: CPT | Mod: TC

## 2025-05-06 PROCEDURE — 72100 X-RAY EXAM L-S SPINE 2/3 VWS: CPT | Mod: 26,,, | Performed by: RADIOLOGY

## 2025-05-07 ENCOUNTER — TELEPHONE (OUTPATIENT)
Dept: PAIN MEDICINE | Facility: CLINIC | Age: 65
End: 2025-05-07
Payer: MEDICARE

## 2025-05-07 NOTE — TELEPHONE ENCOUNTER
Spoke to patient on 5/7/25 @ 3:56 pm to schedule x-ray result appointment with Dr. Matt on 5/9/25 @ 2:15 pm      AC

## 2025-05-07 NOTE — TELEPHONE ENCOUNTER
LVM for patient on 5/7/25 @ 3:26 to schedule Virtual or in - person visit to discuss X-ray results with Dr. Matt. Will call patient back.      AC

## 2025-05-20 ENCOUNTER — OFFICE VISIT (OUTPATIENT)
Dept: PAIN MEDICINE | Facility: CLINIC | Age: 65
End: 2025-05-20
Payer: MEDICARE

## 2025-05-20 VITALS
HEART RATE: 72 BPM | BODY MASS INDEX: 19.3 KG/M2 | WEIGHT: 108.94 LBS | DIASTOLIC BLOOD PRESSURE: 72 MMHG | HEIGHT: 63 IN | SYSTOLIC BLOOD PRESSURE: 107 MMHG

## 2025-05-20 DIAGNOSIS — S32.020A COMPRESSION FRACTURE OF L2 VERTEBRA, INITIAL ENCOUNTER: ICD-10-CM

## 2025-05-20 DIAGNOSIS — Z98.890 S/P KYPHOPLASTY: ICD-10-CM

## 2025-05-20 DIAGNOSIS — S32.010A COMPRESSION FRACTURE OF L1 LUMBAR VERTEBRA, CLOSED, INITIAL ENCOUNTER: Primary | ICD-10-CM

## 2025-05-20 PROCEDURE — 99214 OFFICE O/P EST MOD 30 MIN: CPT | Mod: S$GLB,,, | Performed by: EMERGENCY MEDICINE

## 2025-05-20 NOTE — PROGRESS NOTES
Interventional Pain Management - Established     Interval History 05/20/2025: On 04/16/2025 the patient had L1 and L2 kyphoplasty and they report 40% pain relief.  Her wounds have healed well.  She is becoming progressively more active.    Original HPI 03/25/2025: Eden Kuhn  presents to the clinic for the evaluation of the above pain. The pain started DECEMBER 24 when lifting up a weight that she did not feel was heavy.     Original Pain Description:  The pain is located in the LOWER BACK and is axial. The pain is described as stabbing. Exacerbating factors: Sitting, Standing, Laying, Bending, Touching, Extension, Flexing, and Lifting. Mitigating factors heat, ice, and medications. Symptoms interfere with daily activity and sleeping. The patient feels like symptoms have been worsening. Patient denies night fever/night sweats, urinary incontinence, bowel incontinence, significant weight loss, significant motor weakness, and loss of sensations.  Denies perineal paresthesias, bowel/bladder dysfunction.     PAIN SCORES:  Best: Pain is 7  Current: Pain is 8  Worst: Pain is 10    6 weeks of Conservative therapy:  PT:  Previously for balance  Chiro:  HEP: Unable due to pain    Treatments / Medications:   Xanax 0.5 mg  Norco 10 mg (Dr. Figueroa)  Naproxen 500 mg  Percocet 10 mg  Zanaflex 6 mg    Antiplatelets/Anticoagulants:  NA    Interventional Pain Procedures:   04/16/2025 L1 and L2 kyphoplasty    IMAGING:    MRI LUMBAR SPINE WITHOUT CONTRAST   03/10/2025  L1: VCF, mild loss of vertebral body height, superior endplate edema and superimposed Schmorl's nodes compatible with un healed mild compression fractures  L2: VCF, mild loss of vertebral body height, superior endplate edema and superimposed Schmorl's nodes compatible with un healed mild compression fractures  L1-L2: CDB, left sided medial foraminal annular fissure. Mild central canal stenosis.    L3-L4: CDB, mild facet arthritis and ligamentum flavum  "thickening.  L4-L5: CDB, mild facet arthritis and ligamentum flavum thickening.    L5-S1: CDB       CT THORACIC SPINE WITHOUT CONTRAST  03/19/2025    Vertebrae: Mild height loss superior endplates  L1 & L2 w/trace osseous retropulsion, similar to MRI lumbar    Trace posterosuperior osseous retropulsion L1 & L2 w/associated DB at T12-L1 and L1-L2, resulting in mild spinal canal stenosis.                                                Medications/Allergies: See med card    ROS:  GENERAL: No fever. No chills. No fatigue. Denies weight loss. Denies weight gain.  Back / musculoskeletal / neuro : See HPI    VITALS:   Vitals:    05/20/25 1353   BP: 107/72   Pulse: 72   Weight: 49.4 kg (108 lb 14.5 oz)   Height: 5' 3" (1.6 m)   PainSc:   6   PainLoc: Back       Body mass index is 19.29 kg/m².      5/20/2025     2:00 PM 3/25/2025     3:28 PM   Last 3 PDI Scores   Pain Disability Index (PDI) 35 56       PHYSICAL EXAM:   GENERAL: Well appearing, in no acute distress, alert and oriented x3.  PSYCH:  Mood and affect appropriate.  SKIN: Skin color, texture, turgor normal, no rashes or lesions.  HEENT:  Normocephalic, atraumatic. Cranial nerves grossly intact.  NECK: No pain to palpation over the cervical paraspinous muscles. No pain to palpation over facets. No pain with neck flexion, extension, or lateral flexion.   PULM: No evidence of respiratory difficulty, symmetric chest rise.  GI:  Non-distended  BACK: Normal range of motion.  No tenderness to palpation over upper lumbar spinous processes.  Wounds are well healed.  No pain to palpation over facet joints. There is no pain with palpation over the sacroiliac joints bilaterally.   EXTREMITIES: No deformities, edema, or skin discoloration.   MUSCULOSKELETAL: Shoulder, hip, and knee provocative maneuvers are negative. No atrophy is noted.  NEURO: Sensation is equal and appropriate bilaterally. Bilateral upper and lower extremity strength is normal and symmetric. Bilateral upper " and lower extremity coordination and muscle stretch reflexes are physiologic and symmetric. Plantar response are downgoing. Straight leg raising in the supine position is negative to radicular pain.   GAIT: normal.      LABS:    Lab Results   Component Value Date    HGBA1C 4.8 12/02/2004       Lab Results   Component Value Date    CREATININE 0.8 01/08/2025         ASSESSMENT: 64 y.o. year old female with pain, consistent with:    Encounter Diagnoses   Name Primary?    Compression fracture of L1 lumbar vertebra, closed, initial encounter Yes    Compression fracture of L2 vertebra, initial encounter     S/P kyphoplasty        DISCUSSION: Eden Kuhn is a patient who comes to us with VAS score greater than 6 for over 6 weeks, an MRI that shows subacute L1 and L2 fractures.  Pain improved after kyphoplasty.  Will now pursue physical therapy    PLAN:  - I have stressed the importance of physical activity and a home exercise plan to help with pain and improve health.  - Patient can continue with medications for now since they are providing benefits, using them appropriately, and without side effects.  - Counseled patient regarding the importance of activity modification and constant sleeping habits.  - Therapy: Referral to Physical therapy for Lumbar stabilization, core strengthening, and a home exercise program.  - Anticoagulation use: No no anticoagulation  - Imaging: Reviewed available imaging with patient and answered any questions they had regarding study.  - The patient's pathophysiology was explained in detail with reference to x-rays, models, other visual aids as appropriate.  - Patient has an external Endocrinologist whom she follows for the past 25 years, discussed with her and spouse the need to broach the subject of osteoporosis medication and treatment   - Follow up visit: return to clinic SHAWN Matt MD  05/20/2025

## 2025-06-06 ENCOUNTER — CLINICAL SUPPORT (OUTPATIENT)
Dept: REHABILITATION | Facility: HOSPITAL | Age: 65
End: 2025-06-06
Attending: EMERGENCY MEDICINE
Payer: MEDICARE

## 2025-06-06 DIAGNOSIS — R29.898 DECREASED STRENGTH OF LOWER EXTREMITY: ICD-10-CM

## 2025-06-06 DIAGNOSIS — Z74.09 IMPAIRED FUNCTIONAL MOBILITY, BALANCE, GAIT, AND ENDURANCE: ICD-10-CM

## 2025-06-06 DIAGNOSIS — S32.010D COMPRESSION FRACTURE OF L1 VERTEBRA WITH ROUTINE HEALING: Primary | ICD-10-CM

## 2025-06-06 PROCEDURE — 97112 NEUROMUSCULAR REEDUCATION: CPT | Mod: PO

## 2025-06-06 PROCEDURE — 97161 PT EVAL LOW COMPLEX 20 MIN: CPT | Mod: PO

## 2025-06-19 NOTE — PROGRESS NOTES
"  Outpatient Rehab    Physical Therapy Visit    Patient Name: Eden Kuhn  MRN: [id number]  YOB: 1960  Encounter Date: 6/20/2025    Therapy Diagnosis:   Encounter Diagnoses   Name Primary?    Compression fracture of L1 vertebra with routine healing Yes    Impaired functional mobility, balance, gait, and endurance     Decreased strength of lower extremity      Physician: Chino Matt MD    Physician Orders: Eval and Treat  Medical Diagnosis: Compression fracture of L1 lumbar vertebra, closed, initial encounter  Compression fracture of L2 vertebra, initial encounter  S/P kyphoplasty  Surgical Diagnosis: 1.  Age related osteoporosis with current pathological fracture, two level(s), delayed healing        2.  Pain at the levels of compression fracture.                            3.  No myelopathy or retropulsed fragments.   Surgical Date: 4/16/2025  Days Since Last Surgery: 65    Visit # / Visits Authorized:  1 / 24  Insurance Authorization Period: 5/28/2025 to 9/12/2025  Date of Evaluation: 6/6/2025  Plan of Care Certification: 6/6/2025 to 9/6/2025      PT/PTA: PT   Number of PTA visits since last PT visit:0  Time In: 1130   Time Out: 1217  Total Time (in minutes): 47   Total Billable Time (in minutes): 47    FOTO:  Intake Score: 33%  Survey Score 2:  %  Survey Score 3:  %    Precautions:       Subjective   She is not wearing the lumbar support brace today, but feels that if she has to stand for longer than 5 minutes or walk, then she will need to put her brace on. She fell on Monday and has bruising all over. She is not sure how much she will be able to do today..  Pain reported as 8/10. Lumbar spine    Objective            Treatment:  Therapeutic Exercise  TE 1: seated hip internal/external rotation x15 each B  TE 2: shoulder rows and extensions with red band 2x10  Balance/Neuromuscular Re-Education  NMR 1: hooklying TrA iso with breath work as needed 3x10x5"  NMR 2: quad sets (standing) and " "glute sets (seated) 3x10x5"  NMR 3: seated hip adduction/abduction iso 20x3"  NMR 4: hooklying swiss ball press 2x10x3"    Time Entry(in minutes):  Neuromuscular Re-Education Time Entry: 32  Therapeutic Exercise Time Entry: 15    Assessment & Plan   Assessment: Eden reports feeling weak and painful all the time and is having difficulty because she is not able to do as much as she used to. We added several new exercises today for core, glute, and upper extremity strength which she did well today. Reprinted HEP and provided red band for home use. Continue to progress.   Evaluation/Treatment Tolerance: Patient tolerated treatment well    The patient will continue to benefit from skilled outpatient physical therapy in order to address the deficits listed in the problem list on the initial evaluation, provide patient and family education, and maximize the patients level of independence in the home and community environments.     The patient's spiritual, cultural, and educational needs were considered, and the patient is agreeable to the plan of care and goals.           Plan: continue plan of care    Goals:   Active       Long Term Goals       Pt to demonstrate ability to complete 10 hip bridges without increases in low back pain  (Progressing)       Start:  06/06/25    Expected End:  09/06/25            Report decreased low back pain < / =  3/10  to increase tolerance for ADLs  (Progressing)       Start:  06/06/25    Expected End:  09/06/25            Pt will have improved gcode of CJ (20-40% limited) on FOTO shoulder in order to demonstrate true functional improvement.  (Progressing)       Start:  06/06/25    Expected End:  09/06/25            Pt to tolerate 30 minutes of sitting without need for lumbar support brace  (Progressing)       Start:  06/06/25    Expected End:  09/06/25               Short Term Goals       Pt to tolerate HEP to improve ROM and independence with ADL's  (Progressing)       Start:  " 06/06/25    Expected End:  07/11/25            Increased strength by 1/3 MMT grade in bilateral lower extremity to increase tolerance for ADL and work activities.  (Progressing)       Start:  06/06/25    Expected End:  09/06/25            Report decreased low back pain < / =  5/10  to increase tolerance for participation in PT exercises  (Progressing)       Start:  06/06/25    Expected End:  07/11/25            Pt to perform 5 sit to stands without upper extremity assist.  (Progressing)       Start:  06/06/25    Expected End:  07/11/25                Shannon Price, PT, DPT

## 2025-06-20 ENCOUNTER — CLINICAL SUPPORT (OUTPATIENT)
Dept: REHABILITATION | Facility: HOSPITAL | Age: 65
End: 2025-06-20
Payer: MEDICARE

## 2025-06-20 DIAGNOSIS — S32.010D COMPRESSION FRACTURE OF L1 VERTEBRA WITH ROUTINE HEALING: Primary | ICD-10-CM

## 2025-06-20 DIAGNOSIS — R29.898 DECREASED STRENGTH OF LOWER EXTREMITY: ICD-10-CM

## 2025-06-20 DIAGNOSIS — Z74.09 IMPAIRED FUNCTIONAL MOBILITY, BALANCE, GAIT, AND ENDURANCE: ICD-10-CM

## 2025-06-20 PROCEDURE — 97112 NEUROMUSCULAR REEDUCATION: CPT | Mod: PO

## 2025-06-20 PROCEDURE — 97110 THERAPEUTIC EXERCISES: CPT | Mod: PO

## 2025-06-20 NOTE — PATIENT INSTRUCTIONS
Access Code: [email or web address]  URL: [email or web address]  Date: 06/20/2025  Prepared by: Shannon Price    Exercises  - Supine Transversus Abdominis Bracing - Hands on Stomach  - 1 x daily - 7 x weekly - 3 sets - 10 reps - 5 hold  - Supine Quad Set  - 1 x daily - 7 x weekly - 3 sets - 10 reps - 5 hold  - Supine Gluteal Sets  - 1 x daily - 7 x weekly - 3 sets - 10 reps - 5 hold  - Seated Isometric Hip Abduction with Belt  - 1 x daily - 3 sets - 10 reps - 3 hold  - Seated Isometric Hip Adduction with Ball  - 1 x daily - 7 x weekly - 3 sets - 10 reps  - Seated Hip Internal Rotation AROM  - 1 x daily - 1 sets - 15 reps  - Seated Hip External Rotation AROM  - 1 x daily - 1 sets - 15 reps  - Seated Abdominal Press into Swiss Ball  - 1 x daily - 7 x weekly - 3 sets - 10 reps  - Standing Bilateral Low Shoulder Row with Anchored Resistance  - 1 x daily - 7 x weekly - 3 sets - 10 reps  - Shoulder extension with resistance - Neutral  - 1 x daily - 7 x weekly - 3 sets - 10 reps

## (undated) DEVICE — GLOVE BIOGEL SKINSENSE PI 7.0

## (undated) DEVICE — DRESSING TRANS 4X4 TEGADERM

## (undated) DEVICE — KIT KYPHOPAK KYPHOPLASTY FX

## (undated) DEVICE — TRAY MINOR GEN SURG

## (undated) DEVICE — SPONGE GAUZE 16PLY 4X4

## (undated) DEVICE — DRAPE LAP T SHT W/ INSTR PAD

## (undated) DEVICE — NDL SPINAL DISPOSABLE 23X3.5

## (undated) DEVICE — DEVICE KYPHON BONE BIOPSY 13G

## (undated) DEVICE — UNDERGLOVES BIOGEL PI SIZE 7.5

## (undated) DEVICE — CHLORAPREP W TINT 26ML APPL

## (undated) DEVICE — DRAPE INCISE IOBAN 2 23X17IN

## (undated) DEVICE — NDL SPINAL 22GA 3.5 IN QUINCKE

## (undated) DEVICE — DRAPE C-ARMOR EQUIPMENT COVER

## (undated) DEVICE — SUT SILK 2-0 PS 18IN BLACK

## (undated) DEVICE — DRAPE C-ARM ELAS CLIP 42X120IN

## (undated) DEVICE — KIT KYPHOPAK 1ST FX CDS 15/2

## (undated) DEVICE — DEVICE KYPHON CRV BNE FIL 13G